# Patient Record
Sex: FEMALE | Race: WHITE | NOT HISPANIC OR LATINO | Employment: OTHER | URBAN - NONMETROPOLITAN AREA
[De-identification: names, ages, dates, MRNs, and addresses within clinical notes are randomized per-mention and may not be internally consistent; named-entity substitution may affect disease eponyms.]

---

## 2017-08-17 ENCOUNTER — TELEPHONE (OUTPATIENT)
Dept: GASTROENTEROLOGY | Facility: CLINIC | Age: 32
End: 2017-08-17

## 2017-09-08 ENCOUNTER — TELEPHONE (OUTPATIENT)
Dept: GASTROENTEROLOGY | Facility: CLINIC | Age: 32
End: 2017-09-08

## 2017-09-08 NOTE — TELEPHONE ENCOUNTER
Patient called yesterday wanting to know what was decided about changing her medication.  (Telephone Encounter created on 08/17/2017)  When I called to ask her if she ever had a CT or CTE done she said she had one done yesterday when she was admitted into [facility] for Bowel Obstruction.  She said she has improved since yesterday and they may not have to do any surgery.  I will call and get records faxed over from the CT.

## 2017-09-11 ENCOUNTER — TELEPHONE (OUTPATIENT)
Dept: GASTROENTEROLOGY | Facility: CLINIC | Age: 32
End: 2017-09-11

## 2017-09-11 NOTE — TELEPHONE ENCOUNTER
She had a CT done at [facility].  Results under media.  She had an SBO that was treated conservatively.    So, the answer to the original question is that she should remain on Pentasa.  We could fill out paperwork if that would help.  The other medications don't work in the small bowel, where her problem is.  I also don't see an office appt on the books.  She should schedule with me for routine in Jan and earlier with Keli if problems.    Zehra Solis MD

## 2017-09-11 NOTE — TELEPHONE ENCOUNTER
Patient called this morning stating she was released from [facility] for what they thought was a bowel obstruction but later told she has inflammation in her intestines.  She is still in pain, hurts to walk, and has to sleep holding a pillow against her stomach.  She wants to know if  wants to see her. I informed her that I had already received the CT report earlier this morning and sent it to Dr. Solis to review.  Please check with Dr. Solis on this patient to follow up on what may need to be done.  She also was still wanting to know about her medication change.    (See other telephone encounters.)

## 2017-09-13 ENCOUNTER — TELEPHONE (OUTPATIENT)
Dept: GASTROENTEROLOGY | Facility: CLINIC | Age: 32
End: 2017-09-13

## 2017-09-13 NOTE — TELEPHONE ENCOUNTER
I spoke with patient insurance and they stated that I would have to start an appeal for patients Pentasa. I am going to give patient samples so she has it now.     I will need a letter from you stating why she needs this medication.

## 2017-09-20 RX ORDER — MESALAMINE 500 MG/1
500 CAPSULE, EXTENDED RELEASE ORAL 3 TIMES DAILY
Qty: 90 CAPSULE | Refills: 5 | Status: SHIPPED | OUTPATIENT
Start: 2017-09-20 | End: 2017-09-25 | Stop reason: SDUPTHER

## 2017-09-25 RX ORDER — MESALAMINE 500 MG/1
1000 CAPSULE, EXTENDED RELEASE ORAL 3 TIMES DAILY
Qty: 180 CAPSULE | Refills: 5 | Status: SHIPPED | OUTPATIENT
Start: 2017-09-25 | End: 2020-09-20

## 2017-11-14 ENCOUNTER — HOSPITAL ENCOUNTER (INPATIENT)
Facility: HOSPITAL | Age: 32
LOS: 3 days | Discharge: HOME | End: 2017-11-18
Attending: EMERGENCY MEDICINE | Admitting: SPECIALIST

## 2017-11-14 ENCOUNTER — APPOINTMENT (OUTPATIENT)
Dept: CT IMAGING | Facility: HOSPITAL | Age: 32
End: 2017-11-14

## 2017-11-14 DIAGNOSIS — K56.609 INTESTINAL OBSTRUCTION, UNSPECIFIED CAUSE, UNSPECIFIED WHETHER PARTIAL OR COMPLETE (HCC): ICD-10-CM

## 2017-11-14 DIAGNOSIS — K50.019 CROHN'S DISEASE OF SMALL INTESTINE WITH COMPLICATION (HCC): Primary | ICD-10-CM

## 2017-11-14 LAB
ALBUMIN SERPL-MCNC: 4.7 G/DL (ref 3.5–5)
ALBUMIN/GLOB SERPL: 1.3 G/DL (ref 1.1–2.5)
ALP SERPL-CCNC: 108 U/L (ref 24–120)
ALT SERPL W P-5'-P-CCNC: 37 U/L (ref 0–54)
ANION GAP SERPL CALCULATED.3IONS-SCNC: 14 MMOL/L (ref 4–13)
AST SERPL-CCNC: 34 U/L (ref 7–45)
BASOPHILS # BLD AUTO: 0.03 10*3/MM3 (ref 0–0.2)
BASOPHILS NFR BLD AUTO: 0.1 % (ref 0–2)
BILIRUB SERPL-MCNC: 0.4 MG/DL (ref 0.1–1)
BUN BLD-MCNC: 17 MG/DL (ref 5–21)
BUN/CREAT SERPL: 23.3 (ref 7–25)
CALCIUM SPEC-SCNC: 10.1 MG/DL (ref 8.4–10.4)
CHLORIDE SERPL-SCNC: 102 MMOL/L (ref 98–110)
CO2 SERPL-SCNC: 25 MMOL/L (ref 24–31)
CREAT BLD-MCNC: 0.73 MG/DL (ref 0.5–1.4)
DEPRECATED RDW RBC AUTO: 44.2 FL (ref 40–54)
EOSINOPHIL # BLD AUTO: 0.05 10*3/MM3 (ref 0–0.7)
EOSINOPHIL NFR BLD AUTO: 0.2 % (ref 0–4)
ERYTHROCYTE [DISTWIDTH] IN BLOOD BY AUTOMATED COUNT: 14 % (ref 12–15)
GFR SERPL CREATININE-BSD FRML MDRD: 91 ML/MIN/1.73
GLOBULIN UR ELPH-MCNC: 3.5 GM/DL
GLUCOSE BLD-MCNC: 112 MG/DL (ref 70–100)
HCT VFR BLD AUTO: 40.8 % (ref 37–47)
HGB BLD-MCNC: 13.6 G/DL (ref 12–16)
HOLD SPECIMEN: NORMAL
HOLD SPECIMEN: NORMAL
IMM GRANULOCYTES # BLD: 0.13 10*3/MM3 (ref 0–0.03)
IMM GRANULOCYTES NFR BLD: 0.5 % (ref 0–5)
LIPASE SERPL-CCNC: 43 U/L (ref 23–203)
LYMPHOCYTES # BLD AUTO: 1.12 10*3/MM3 (ref 0.72–4.86)
LYMPHOCYTES NFR BLD AUTO: 4.3 % (ref 15–45)
MCH RBC QN AUTO: 29.2 PG (ref 28–32)
MCHC RBC AUTO-ENTMCNC: 33.3 G/DL (ref 33–36)
MCV RBC AUTO: 87.7 FL (ref 82–98)
MONOCYTES # BLD AUTO: 1.31 10*3/MM3 (ref 0.19–1.3)
MONOCYTES NFR BLD AUTO: 5 % (ref 4–12)
NEUTROPHILS # BLD AUTO: 23.38 10*3/MM3 (ref 1.87–8.4)
NEUTROPHILS NFR BLD AUTO: 89.9 % (ref 39–78)
PLATELET # BLD AUTO: 395 10*3/MM3 (ref 130–400)
PMV BLD AUTO: 10.1 FL (ref 6–12)
POTASSIUM BLD-SCNC: 4.1 MMOL/L (ref 3.5–5.3)
PROT SERPL-MCNC: 8.2 G/DL (ref 6.3–8.7)
RBC # BLD AUTO: 4.65 10*6/MM3 (ref 4.2–5.4)
SODIUM BLD-SCNC: 141 MMOL/L (ref 135–145)
WBC NRBC COR # BLD: 26.02 10*3/MM3 (ref 4.8–10.8)
WHOLE BLOOD HOLD SPECIMEN: NORMAL
WHOLE BLOOD HOLD SPECIMEN: NORMAL

## 2017-11-14 PROCEDURE — 81001 URINALYSIS AUTO W/SCOPE: CPT | Performed by: EMERGENCY MEDICINE

## 2017-11-14 PROCEDURE — 25010000002 MORPHINE SULFATE (PF) 2 MG/ML SOLUTION: Performed by: EMERGENCY MEDICINE

## 2017-11-14 PROCEDURE — 83690 ASSAY OF LIPASE: CPT | Performed by: EMERGENCY MEDICINE

## 2017-11-14 PROCEDURE — 85025 COMPLETE CBC W/AUTO DIFF WBC: CPT | Performed by: EMERGENCY MEDICINE

## 2017-11-14 PROCEDURE — 81025 URINE PREGNANCY TEST: CPT | Performed by: EMERGENCY MEDICINE

## 2017-11-14 PROCEDURE — 99285 EMERGENCY DEPT VISIT HI MDM: CPT

## 2017-11-14 PROCEDURE — 25010000002 ONDANSETRON PER 1 MG: Performed by: EMERGENCY MEDICINE

## 2017-11-14 PROCEDURE — 74177 CT ABD & PELVIS W/CONTRAST: CPT

## 2017-11-14 PROCEDURE — 80053 COMPREHEN METABOLIC PANEL: CPT | Performed by: EMERGENCY MEDICINE

## 2017-11-14 PROCEDURE — 87086 URINE CULTURE/COLONY COUNT: CPT | Performed by: EMERGENCY MEDICINE

## 2017-11-14 PROCEDURE — 80074 ACUTE HEPATITIS PANEL: CPT | Performed by: INTERNAL MEDICINE

## 2017-11-14 RX ORDER — ONDANSETRON 2 MG/ML
4 INJECTION INTRAMUSCULAR; INTRAVENOUS ONCE
Status: COMPLETED | OUTPATIENT
Start: 2017-11-14 | End: 2017-11-14

## 2017-11-14 RX ORDER — MORPHINE SULFATE 2 MG/ML
2 INJECTION, SOLUTION INTRAMUSCULAR; INTRAVENOUS ONCE
Status: COMPLETED | OUTPATIENT
Start: 2017-11-14 | End: 2017-11-14

## 2017-11-14 RX ORDER — SODIUM CHLORIDE 0.9 % (FLUSH) 0.9 %
10 SYRINGE (ML) INJECTION AS NEEDED
Status: DISCONTINUED | OUTPATIENT
Start: 2017-11-14 | End: 2017-11-18 | Stop reason: HOSPADM

## 2017-11-14 RX ADMIN — ONDANSETRON 4 MG: 2 INJECTION, SOLUTION INTRAMUSCULAR; INTRAVENOUS at 21:22

## 2017-11-14 RX ADMIN — SODIUM CHLORIDE 1000 ML: 9 INJECTION, SOLUTION INTRAVENOUS at 21:57

## 2017-11-14 RX ADMIN — MORPHINE SULFATE 2 MG: 2 INJECTION, SOLUTION INTRAMUSCULAR; INTRAVENOUS at 21:23

## 2017-11-15 PROBLEM — K50.00 CROHN'S DISEASE OF SMALL INTESTINE (HCC): Status: ACTIVE | Noted: 2017-11-15

## 2017-11-15 PROBLEM — K56.609 BOWEL OBSTRUCTION (HCC): Status: ACTIVE | Noted: 2017-11-15

## 2017-11-15 LAB
B-HCG UR QL: NEGATIVE
BACTERIA UR QL AUTO: ABNORMAL /HPF
BASOPHILS # BLD AUTO: 0.01 10*3/MM3 (ref 0–0.2)
BASOPHILS NFR BLD AUTO: 0.1 % (ref 0–2)
BILIRUB UR QL STRIP: ABNORMAL
CLARITY UR: ABNORMAL
COLOR UR: ABNORMAL
DEPRECATED RDW RBC AUTO: 46.8 FL (ref 40–54)
EOSINOPHIL # BLD AUTO: 0.05 10*3/MM3 (ref 0–0.7)
EOSINOPHIL NFR BLD AUTO: 0.5 % (ref 0–4)
ERYTHROCYTE [DISTWIDTH] IN BLOOD BY AUTOMATED COUNT: 14.2 % (ref 12–15)
ERYTHROCYTE [SEDIMENTATION RATE] IN BLOOD: 2 MM/HR (ref 0–20)
GLUCOSE UR STRIP-MCNC: NEGATIVE MG/DL
HAV IGM SERPL QL IA: NEGATIVE
HBV CORE IGM SERPL QL IA: NEGATIVE
HBV SURFACE AG SERPL QL IA: NEGATIVE
HCT VFR BLD AUTO: 37.6 % (ref 37–47)
HCV AB SER DONR QL: NEGATIVE
HCV S/C RATIO: 0.07 (ref 0–0.99)
HGB BLD-MCNC: 12 G/DL (ref 12–16)
HGB UR QL STRIP.AUTO: NEGATIVE
IMM GRANULOCYTES # BLD: 0.04 10*3/MM3 (ref 0–0.03)
IMM GRANULOCYTES NFR BLD: 0.4 % (ref 0–5)
KETONES UR QL STRIP: ABNORMAL
LEUKOCYTE ESTERASE UR QL STRIP.AUTO: ABNORMAL
LYMPHOCYTES # BLD AUTO: 0.99 10*3/MM3 (ref 0.72–4.86)
LYMPHOCYTES NFR BLD AUTO: 9 % (ref 15–45)
MCH RBC QN AUTO: 28.8 PG (ref 28–32)
MCHC RBC AUTO-ENTMCNC: 31.9 G/DL (ref 33–36)
MCV RBC AUTO: 90.2 FL (ref 82–98)
MONOCYTES # BLD AUTO: 1.08 10*3/MM3 (ref 0.19–1.3)
MONOCYTES NFR BLD AUTO: 9.9 % (ref 4–12)
MUCOUS THREADS URNS QL MICRO: ABNORMAL /HPF
NEUTROPHILS # BLD AUTO: 8.79 10*3/MM3 (ref 1.87–8.4)
NEUTROPHILS NFR BLD AUTO: 80.1 % (ref 39–78)
NITRITE UR QL STRIP: NEGATIVE
PH UR STRIP.AUTO: 5.5 [PH] (ref 5–8)
PLATELET # BLD AUTO: 349 10*3/MM3 (ref 130–400)
PMV BLD AUTO: 10 FL (ref 6–12)
PROT UR QL STRIP: ABNORMAL
RBC # BLD AUTO: 4.17 10*6/MM3 (ref 4.2–5.4)
RBC # UR: ABNORMAL /HPF
REF LAB TEST METHOD: ABNORMAL
SP GR UR STRIP: >1.03 (ref 1–1.03)
SQUAMOUS #/AREA URNS HPF: ABNORMAL /HPF
UROBILINOGEN UR QL STRIP: ABNORMAL
WBC NRBC COR # BLD: 10.96 10*3/MM3 (ref 4.8–10.8)
WBC UR QL AUTO: ABNORMAL /HPF

## 2017-11-15 PROCEDURE — 25010000002 METHYLPREDNISOLONE PER 125 MG: Performed by: NURSE PRACTITIONER

## 2017-11-15 PROCEDURE — 99222 1ST HOSP IP/OBS MODERATE 55: CPT | Performed by: INTERNAL MEDICINE

## 2017-11-15 PROCEDURE — 85651 RBC SED RATE NONAUTOMATED: CPT | Performed by: NURSE PRACTITIONER

## 2017-11-15 PROCEDURE — 85025 COMPLETE CBC W/AUTO DIFF WBC: CPT | Performed by: NURSE PRACTITIONER

## 2017-11-15 PROCEDURE — 25010000002 MORPHINE SULFATE (PF) 2 MG/ML SOLUTION: Performed by: EMERGENCY MEDICINE

## 2017-11-15 PROCEDURE — 0 IOPAMIDOL 61 % SOLUTION: Performed by: EMERGENCY MEDICINE

## 2017-11-15 PROCEDURE — 25010000003 AMPICILLIN-SULBACTAM PER 1.5 G: Performed by: SPECIALIST

## 2017-11-15 RX ORDER — ONDANSETRON 2 MG/ML
4 INJECTION INTRAMUSCULAR; INTRAVENOUS EVERY 4 HOURS PRN
Status: DISCONTINUED | OUTPATIENT
Start: 2017-11-15 | End: 2017-11-18 | Stop reason: HOSPADM

## 2017-11-15 RX ORDER — AMPICILLIN AND SULBACTAM 2; 1 G/1; G/1
3 INJECTION, POWDER, FOR SOLUTION INTRAMUSCULAR; INTRAVENOUS EVERY 6 HOURS SCHEDULED
Status: DISCONTINUED | OUTPATIENT
Start: 2017-11-15 | End: 2017-11-15

## 2017-11-15 RX ORDER — METHYLPREDNISOLONE SODIUM SUCCINATE 125 MG/2ML
60 INJECTION, POWDER, LYOPHILIZED, FOR SOLUTION INTRAMUSCULAR; INTRAVENOUS EVERY 8 HOURS
Status: DISCONTINUED | OUTPATIENT
Start: 2017-11-15 | End: 2017-11-17

## 2017-11-15 RX ORDER — ONDANSETRON 2 MG/ML
INJECTION INTRAMUSCULAR; INTRAVENOUS
Status: DISCONTINUED
Start: 2017-11-15 | End: 2017-11-15

## 2017-11-15 RX ORDER — MORPHINE SULFATE 2 MG/ML
2 INJECTION, SOLUTION INTRAMUSCULAR; INTRAVENOUS
Status: DISCONTINUED | OUTPATIENT
Start: 2017-11-15 | End: 2017-11-18 | Stop reason: HOSPADM

## 2017-11-15 RX ORDER — PROMETHAZINE HYDROCHLORIDE 25 MG/ML
12.5 INJECTION, SOLUTION INTRAMUSCULAR; INTRAVENOUS ONCE
Status: DISCONTINUED | OUTPATIENT
Start: 2017-11-15 | End: 2017-11-18 | Stop reason: HOSPADM

## 2017-11-15 RX ORDER — SODIUM CHLORIDE 9 MG/ML
125 INJECTION, SOLUTION INTRAVENOUS CONTINUOUS
Status: DISCONTINUED | OUTPATIENT
Start: 2017-11-15 | End: 2017-11-17

## 2017-11-15 RX ADMIN — MORPHINE SULFATE 2 MG: 2 INJECTION, SOLUTION INTRAMUSCULAR; INTRAVENOUS at 15:20

## 2017-11-15 RX ADMIN — AMPICILLIN SODIUM AND SULBACTAM SODIUM 3 G: 2; 1 INJECTION, POWDER, FOR SOLUTION INTRAMUSCULAR; INTRAVENOUS at 14:01

## 2017-11-15 RX ADMIN — MORPHINE SULFATE 2 MG: 2 INJECTION, SOLUTION INTRAMUSCULAR; INTRAVENOUS at 11:05

## 2017-11-15 RX ADMIN — SODIUM CHLORIDE, POTASSIUM CHLORIDE, SODIUM LACTATE AND CALCIUM CHLORIDE 1000 ML: 600; 310; 30; 20 INJECTION, SOLUTION INTRAVENOUS at 04:15

## 2017-11-15 RX ADMIN — PHENOL 2 SPRAY: 1.5 LIQUID ORAL at 12:16

## 2017-11-15 RX ADMIN — MORPHINE SULFATE 2 MG: 2 INJECTION, SOLUTION INTRAMUSCULAR; INTRAVENOUS at 07:51

## 2017-11-15 RX ADMIN — SODIUM CHLORIDE 125 ML/HR: 9 INJECTION, SOLUTION INTRAVENOUS at 13:02

## 2017-11-15 RX ADMIN — METHYLPREDNISOLONE SODIUM SUCCINATE 60 MG: 125 INJECTION, POWDER, FOR SOLUTION INTRAMUSCULAR; INTRAVENOUS at 20:45

## 2017-11-15 RX ADMIN — AMPICILLIN SODIUM AND SULBACTAM SODIUM 3 G: 2; 1 INJECTION, POWDER, FOR SOLUTION INTRAMUSCULAR; INTRAVENOUS at 20:39

## 2017-11-15 RX ADMIN — SODIUM CHLORIDE 125 ML/HR: 9 INJECTION, SOLUTION INTRAVENOUS at 05:44

## 2017-11-15 RX ADMIN — METHYLPREDNISOLONE SODIUM SUCCINATE 60 MG: 125 INJECTION, POWDER, FOR SOLUTION INTRAMUSCULAR; INTRAVENOUS at 13:59

## 2017-11-15 RX ADMIN — IOPAMIDOL 100 ML: 612 INJECTION, SOLUTION INTRAVENOUS at 04:25

## 2017-11-15 RX ADMIN — AMPICILLIN SODIUM AND SULBACTAM SODIUM 3 G: 2; 1 INJECTION, POWDER, FOR SOLUTION INTRAMUSCULAR; INTRAVENOUS at 08:04

## 2017-11-15 NOTE — PLAN OF CARE
Problem: Patient Care Overview (Adult)  Goal: Plan of Care Review  Outcome: Ongoing (interventions implemented as appropriate)    11/15/17 1506   Coping/Psychosocial Response Interventions   Plan Of Care Reviewed With patient   Patient Care Overview   Progress no change   Outcome Evaluation   Outcome Summary/Follow up Plan NG to LIWS. Medicated prn for abdominal pain. Moderate output to NG. IV abx continue. Blood pressure improving. GI consulted for Crohn's.       Goal: Adult Individualization and Mutuality  Outcome: Ongoing (interventions implemented as appropriate)    11/15/17 1506   Individualization   Patient Specific Preferences lights off   Patient Specific Goals decrease pain   Patient Specific Interventions prn pain medication   Mutuality/Individual Preferences   What Anxieties, Fears or Concerns Do You Have About Your Health or Care? none   What Questions Do You Have About Your Health or Care? none   What Information Would Help Us Give You More Personalized Care? none       Goal: Discharge Needs Assessment  Outcome: Ongoing (interventions implemented as appropriate)    11/15/17 1506   Discharge Needs Assessment   Concerns To Be Addressed no discharge needs identified   Readmission Within The Last 30 Days no previous admission in last 30 days   Equipment Needed After Discharge none   Discharge Disposition still a patient   Current Health   Anticipated Changes Related to Illness none   Self-Care   Equipment Currently Used at Home none   Living Environment   Transportation Available family or friend will provide;car         Problem: Bowel Obstruction (Adult)  Goal: Signs and Symptoms of Listed Potential Problems Will be Absent or Manageable (Bowel Obstruction)  Outcome: Ongoing (interventions implemented as appropriate)    11/15/17 1506   Bowel Obstruction   Problems Assessed (Bowel Obstruction) all   Problems Present (Bowel Obstruction) pain;fluid volume deficit/hypovolemia         Problem: Fluid Volume  Deficit (Adult)  Goal: Identify Related Risk Factors and Signs and Symptoms  Outcome: Outcome(s) achieved Date Met:  11/15/17    11/15/17 1506   Fluid Volume Deficit   Fluid Volume Deficit: Related Risk Factors other (see comments);vomiting/diarrhea  (NG tube)   Signs and Symptoms (Fluid Volume Deficit) abdominal cramping/distention;lab value changes;nausea/vomiting, anorexia, diarrhea complaints       Goal: Fluid/Electrolyte Balance  Outcome: Ongoing (interventions implemented as appropriate)    11/15/17 1506   Fluid Volume Deficit (Adult)   Fluid/Electrolyte Balance making progress toward outcome       Goal: Comfort/Well Being  Outcome: Ongoing (interventions implemented as appropriate)    11/15/17 1506   Fluid Volume Deficit (Adult)   Comfort/Well Being making progress toward outcome

## 2017-11-15 NOTE — H&P
Damaris Billy MD  H&P    Patient Care Team:  No Known Provider as PCP - General    Chief complaint abdominal pain nausea vomiting    Subjective     Dhara Carmona  is a 32 y.o. female presents with onset of abdominal pain nausea vomiting and bloating yesterday progressively worsened through the day.  Came to the ER for evaluation after vomiting.  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  Treated by Dr. Solis.  Last bowel movement was yesterday and normal..      Review of Systems   Pertinent items are noted in HPI, all other systems reviewed and negative    History  History reviewed. No pertinent past medical history.  Past Surgical History:   Procedure Laterality Date   • ANAL FISTULA REPAIR     •  SECTION     • DILATATION AND CURETTAGE       History reviewed. No pertinent family history.  Social History   Substance Use Topics   • Smoking status: Never Smoker   • Smokeless tobacco: None   • Alcohol use No     Prescriptions Prior to Admission   Medication Sig Dispense Refill Last Dose   • mesalamine (PENTASA) 500 MG CR capsule Take 2 capsules by mouth 3 (Three) Times a Day. 180 capsule 5      Allergies:  Guaifenesin er    Objective     Vital Signs  Temp:  [97.9 °F (36.6 °C)-99.1 °F (37.3 °C)] 99.1 °F (37.3 °C)  Heart Rate:  [] 103  Resp:  [12-20] 16  BP: ()/(54-85) 98/65    Physical Exam:      General Appearance:    Alert, cooperative, in no acute distress   Head:    Normocephalic, without obvious abnormality, atraumatic   Eyes:            Lids and lashes normal, conjunctivae and sclerae normal, no   icterus, no pallor, corneas clear, PERRLA   Ears:    Ears appear intact with no abnormalities noted   Neck:   No adenopathy, supple, trachea midline   Back:     No kyphosis present, no scoliosis present, no skin lesions,      erythema or scars, no tenderness to percussion or                   palpation,   range of motion normal   Lungs:     Clear to auscultation,respirations  regular, even and                  unlabored    Heart:    Regular rhythm and normal rate, normal S1 and S2, no            murmur, no gallop, no rub, no click   Chest Wall:    No abnormalities observed   Abdomen:   Soft distended with tympany mildly some mild right lower quadrant tenderness no peritoneal signs no masses or organomegaly that I can appreciate    Rectal:     Deferred   Extremities:   Moves all extremities well, no edema, no cyanosis, no             redness   Pulses:   Pulses palpable and equal bilaterally   Skin:   No bleeding, bruising or rash   Lymph nodes:   No palpable adenopathy   Neurologic:   No focal deficits       Results Review:      Lab Results (last 72 hours)     Procedure Component Value Units Date/Time    CBC & Differential [[id number]] Collected:  11/14/17 2126    Specimen:  Blood Updated:  11/14/17 2132    Narrative:       The following orders were created for panel order CBC & Differential.  Procedure                               Abnormality         Status                     ---------                               -----------         ------                     CBC Auto Differential[[id number]]         Abnormal            Final result                 Please view results for these tests on the individual orders.    CBC Auto Differential [[id number]]  (Abnormal) Collected:  11/14/17 2126    Specimen:  Blood Updated:  11/14/17 2132     WBC 26.02 (H) 10*3/mm3      RBC 4.65 10*6/mm3      Hemoglobin 13.6 g/dL      Hematocrit 40.8 %      MCV 87.7 fL      MCH 29.2 pg      MCHC 33.3 g/dL      RDW 14.0 %      RDW-SD 44.2 fl      MPV 10.1 fL      Platelets 395 10*3/mm3      Neutrophil % 89.9 (H) %      Lymphocyte % 4.3 (L) %      Monocyte % 5.0 %      Eosinophil % 0.2 %      Basophil % 0.1 %      Immature Grans % 0.5 %      Neutrophils, Absolute 23.38 (H) 10*3/mm3      Lymphocytes, Absolute 1.12 10*3/mm3      Monocytes, Absolute 1.31 (H) 10*3/mm3      Eosinophils, Absolute 0.05 10*3/mm3      Basophils,  Absolute 0.03 10*3/mm3      Immature Grans, Absolute 0.13 (H) 10*3/mm3     Comprehensive Metabolic Panel [[id number]]  (Abnormal) Collected:  11/14/17 2126    Specimen:  Blood Updated:  11/14/17 2142     Glucose 112 (H) mg/dL      BUN 17 mg/dL      Creatinine 0.73 mg/dL      Sodium 141 mmol/L      Potassium 4.1 mmol/L      Chloride 102 mmol/L      CO2 25.0 mmol/L      Calcium 10.1 mg/dL      Total Protein 8.2 g/dL      Albumin 4.70 g/dL      ALT (SGPT) 37 U/L      AST (SGOT) 34 U/L      Alkaline Phosphatase 108 U/L      Total Bilirubin 0.4 mg/dL      eGFR Non African Amer 91 mL/min/1.73      Globulin 3.5 gm/dL      A/G Ratio 1.3 g/dL      BUN/Creatinine Ratio 23.3     Anion Gap 14.0 (H) mmol/L     Lipase [[id number]]  (Normal) Collected:  11/14/17 2126    Specimen:  Blood Updated:  11/14/17 2142     Lipase 43 U/L     Ransom Draw [[id number]] Collected:  11/14/17 2126    Specimen:  Blood Updated:  11/14/17 2231    Narrative:       The following orders were created for panel order Ransom Draw.  Procedure                               Abnormality         Status                     ---------                               -----------         ------                     Light Blue Top[[id number]]                                    Final result               Green Top (Gel)[[id number]]                                   Final result               Lavender Top[[id number]]                                      Final result               Red Top[[id number]]                                           Final result                 Please view results for these tests on the individual orders.    Light Blue Top [[id number]] Collected:  11/14/17 2126    Specimen:  Blood Updated:  11/14/17 2231     Extra Tube hold for add-on      Auto resulted       Green Top (Gel) [[id number]] Collected:  11/14/17 2126    Specimen:  Blood Updated:  11/14/17 2231     Extra Tube Hold for add-ons.      Auto resulted.       Lavender Top [[id number]] Collected:  11/14/17  2126    Specimen:  Blood Updated:  11/14/17 2231     Extra Tube hold for add-on      Auto resulted       Red Top [[id number]] Collected:  11/14/17 2126    Specimen:  Blood Updated:  11/14/17 2231     Extra Tube Hold for add-ons.      Auto resulted.       Pregnancy, Urine - Urine, Clean Catch [[id number]]  (Normal) Collected:  11/14/17 2340    Specimen:  Urine from Urine, Clean Catch Updated:  11/15/17 0428     HCG, Urine QL Negative    Urine Culture - Urine, Urine, Clean Catch [[id number]] Collected:  11/14/17 2340    Specimen:  Urine from Urine, Clean Catch Updated:  11/15/17 0557    Urinalysis With / Culture If Indicated - Urine, Clean Catch [[id number]]  (Abnormal) Collected:  11/14/17 2340    Specimen:  Urine from Urine, Clean Catch Updated:  11/15/17 0559     Color, UA Dark Yellow (A)     Appearance, UA Cloudy (A)     pH, UA 5.5     Specific Gravity, UA >1.030 (H)     Glucose, UA Negative     Ketones, UA Trace (A)     Bilirubin, UA Small (1+) (A)     Blood, UA Negative     Protein, UA Trace (A)     Leuk Esterase, UA Trace (A)     Nitrite, UA Negative     Urobilinogen, UA 1.0 E.U./dL    Urinalysis, Microscopic Only - Urine, Clean Catch [[id number]]  (Abnormal) Collected:  11/14/17 2340    Specimen:  Urine from Urine, Clean Catch Updated:  11/15/17 0559     RBC, UA 3-5 (A) /HPF      WBC, UA 3-5 (A) /HPF      Bacteria, UA 1+ (A) /HPF      Squamous Epithelial Cells, UA 7-12 (A) /HPF      Mucus, UA Large/3+ (A) /HPF      Methodology Automated Microscopy        Imaging Results (last 72 hours)     Procedure Component Value Units Date/Time    CT Abdomen Pelvis With Contrast [[id number]] Collected:  11/15/17 0725     Updated:  11/15/17 0737    Narrative:       EXAMINATION:  CT ABDOMEN PELVIS W CONTRAST-  11/14/2017 2:09 AM CST     HISTORY: Abdominal pain with nausea and vomiting. White blood cell count  of 26,020. There is a history of ulcerative colitis and anal fistula  repair.     TECHNIQUE: Spiral CT was performed of the  abdomen and pelvis with  contrast. Multiplanar images were reconstructed.     DLP: 277 mGy-cm. Automated dosage control was utilized.     COMPARISON: No comparison study.      LUNG BASES: Minimal dependent atelectasis.     LIVER AND SPLEEN: There is fatty infiltration of the liver. Mild  distention of the gallbladder measuring 4.4 cm in short axis diameter.  Unremarkable spleen.     PANCREAS: No pancreatic mass or inflammatory change.     KIDNEYS AND ADRENALS: The kidneys and adrenal glands are unremarkable.  No bladder abnormality is detected.     BOWEL: The stomach and small bowel are distended with fluid with  multiple air-fluid levels. There is a moderately long segment of  abnormal distal ileum with wall thickening and stranding of the fat  around the distal small bowel. This extends to the ileocecal valve.  Small bowel measures up to 4.6 cm in diameter. The colon is decompressed  from the splenic flexure distally. The appendix is normal.     OTHER: There is a small amount of free fluid in the pelvis. There has  been prior tubal ligation. The uterus and ovaries demonstrate a normal  CT appearance. 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       Impression:       1. There is wall thickening of a fairly long segment of distal ileum  extending all the way to the ileocecal valve. Small bowel is dilated  proximal to the area of thickening and measures up to 4.6 cm in  diameter. There is gastric distention with fluid. There are air-fluid  levels throughout the dilated small bowel. There is decompression of the  colon from the splenic flexure distally. These findings are consistent  with inflammation of the distal ileum. This would be a common finding in  patients with Crohn's disease. Ulcerative colitis with backwash ileitis  is possible. Yersinia infection and other infectious or inflammatory  diseases are also in the differential diagnosis.  2. Small amount of free fluid likely related  to the above findings.  3. Fatty infiltration of the liver.        This report was finalized on 11/15/2017 07:34 by Dr. Kuldip Dent MD.          Assessment/Plan     Active Problems:    Bowel obstruction  Dehydration      Consults GI.  She received several boluses yesterday for low blood pressure dehydration.  We will increase artery fluids rate now monitor I's and O's closely.      Damaris Billy MD  11/15/17  9:27 AM

## 2017-11-15 NOTE — ED NOTES
SEE [facility] DOWN TIME FLOW SHEETS FOR FURTHER CHARTING.       Phil Ferro, RN  11/15/17 4344

## 2017-11-15 NOTE — PAYOR COMM NOTE
"Dhara Santiago (32 y.o. Female) [id number]  [facility] phone   Fax         Date of Birth Social Security Number Address Home Phone MRN    [id number]  [id number] [address] [phone] [id number]    Tenriism Marital Status          Mandaen        Admission Date Admission Type Admitting Provider Attending Provider Department, Room/Bed    11/14/17 Emergency Damaris Billy MD Tyrrell, Dana R, MD [facility] 3C, 374/1    Discharge Date Discharge Disposition Discharge Destination                      Attending Provider: Damaris Billy MD     Allergies:  Guaifenesin Er    Isolation:  None   Infection:  None   Code Status:  FULL    Ht:  60\" (152.4 cm)   Wt:  133 lb (60.3 kg)    Admission Cmt:  None   Principal Problem:  None                Active Insurance as of 11/14/2017     Primary Coverage     Payor Plan Insurance Group Employer/Plan Group    [employer]ORT [employer]ORT MEDICAID     Payor Plan Address Payor Plan Phone Number Effective From Effective To    [address] [phone] 11/1/2016     [place] [address]       Subscriber Name Subscriber Birth Date Member ID       DHARA SANTIAGO 1985 [id number]                 Emergency Contacts      (Rel.) Home Phone Work Phone Mobile Phone    Contact,No [phone] -- --               History & Physical      Damaris Billy MD at 11/15/2017  9:27 AM            Damaris Billy MD  H&P    Patient Care Team:  No Known Provider as PCP - General    Chief complaint abdominal pain nausea vomiting    Subjective     Dhara Santiago  is a 32 y.o. female presents with onset of abdominal pain nausea vomiting and bloating yesterday progressively worsened through the day.  Came to the ER for evaluation after vomiting.  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  Treated by Dr. Solis.  Last bowel movement was yesterday and normal..      Review of " Systems   Pertinent items are noted in HPI, all other systems reviewed and negative    History  History reviewed. No pertinent past medical history.  Past Surgical History:   Procedure Laterality Date   • ANAL FISTULA REPAIR     •  SECTION     • DILATATION AND CURETTAGE       History reviewed. No pertinent family history.  Social History   Substance Use Topics   • Smoking status: Never Smoker   • Smokeless tobacco: None   • Alcohol use No     Prescriptions Prior to Admission   Medication Sig Dispense Refill Last Dose   • mesalamine (PENTASA) 500 MG CR capsule Take 2 capsules by mouth 3 (Three) Times a Day. 180 capsule 5      Allergies:  Guaifenesin er    Objective     Vital Signs  Temp:  [97.9 °F (36.6 °C)-99.1 °F (37.3 °C)] 99.1 °F (37.3 °C)  Heart Rate:  [] 103  Resp:  [12-20] 16  BP: ()/(54-85) 98/65    Physical Exam:      General Appearance:    Alert, cooperative, in no acute distress   Head:    Normocephalic, without obvious abnormality, atraumatic   Eyes:            Lids and lashes normal, conjunctivae and sclerae normal, no   icterus, no pallor, corneas clear, PERRLA   Ears:    Ears appear intact with no abnormalities noted   Neck:   No adenopathy, supple, trachea midline   Back:     No kyphosis present, no scoliosis present, no skin lesions,      erythema or scars, no tenderness to percussion or                   palpation,   range of motion normal   Lungs:     Clear to auscultation,respirations regular, even and                  unlabored    Heart:    Regular rhythm and normal rate, normal S1 and S2, no            murmur, no gallop, no rub, no click   Chest Wall:    No abnormalities observed   Abdomen:   Soft distended with tympany mildly some mild right lower quadrant tenderness no peritoneal signs no masses or organomegaly that I can appreciate    Rectal:     Deferred   Extremities:   Moves all extremities well, no edema, no cyanosis, no             redness   Pulses:   Pulses palpable  and equal bilaterally   Skin:   No bleeding, bruising or rash   Lymph nodes:   No palpable adenopathy   Neurologic:   No focal deficits       Results Review:      Lab Results (last 72 hours)     Procedure Component Value Units Date/Time    CBC & Differential [[id number]] Collected:  11/14/17 2126    Specimen:  Blood Updated:  11/14/17 2132    Narrative:       The following orders were created for panel order CBC & Differential.  Procedure                               Abnormality         Status                     ---------                               -----------         ------                     CBC Auto Differential[[id number]]         Abnormal            Final result                 Please view results for these tests on the individual orders.    CBC Auto Differential [[id number]]  (Abnormal) Collected:  11/14/17 2126    Specimen:  Blood Updated:  11/14/17 2132     WBC 26.02 (H) 10*3/mm3      RBC 4.65 10*6/mm3      Hemoglobin 13.6 g/dL      Hematocrit 40.8 %      MCV 87.7 fL      MCH 29.2 pg      MCHC 33.3 g/dL      RDW 14.0 %      RDW-SD 44.2 fl      MPV 10.1 fL      Platelets 395 10*3/mm3      Neutrophil % 89.9 (H) %      Lymphocyte % 4.3 (L) %      Monocyte % 5.0 %      Eosinophil % 0.2 %      Basophil % 0.1 %      Immature Grans % 0.5 %      Neutrophils, Absolute 23.38 (H) 10*3/mm3      Lymphocytes, Absolute 1.12 10*3/mm3      Monocytes, Absolute 1.31 (H) 10*3/mm3      Eosinophils, Absolute 0.05 10*3/mm3      Basophils, Absolute 0.03 10*3/mm3      Immature Grans, Absolute 0.13 (H) 10*3/mm3     Comprehensive Metabolic Panel [[id number]]  (Abnormal) Collected:  11/14/17 2126    Specimen:  Blood Updated:  11/14/17 2142     Glucose 112 (H) mg/dL      BUN 17 mg/dL      Creatinine 0.73 mg/dL      Sodium 141 mmol/L      Potassium 4.1 mmol/L      Chloride 102 mmol/L      CO2 25.0 mmol/L      Calcium 10.1 mg/dL      Total Protein 8.2 g/dL      Albumin 4.70 g/dL      ALT (SGPT) 37 U/L      AST (SGOT) 34 U/L      Alkaline  Phosphatase 108 U/L      Total Bilirubin 0.4 mg/dL      eGFR Non African Amer 91 mL/min/1.73      Globulin 3.5 gm/dL      A/G Ratio 1.3 g/dL      BUN/Creatinine Ratio 23.3     Anion Gap 14.0 (H) mmol/L     Lipase [[id number]]  (Normal) Collected:  11/14/17 2126    Specimen:  Blood Updated:  11/14/17 2142     Lipase 43 U/L     [place] Draw [[id number]] Collected:  11/14/17 2126    Specimen:  Blood Updated:  11/14/17 2231    Narrative:       The following orders were created for panel order [place] Draw.  Procedure                               Abnormality         Status                     ---------                               -----------         ------                     Light Blue Top[[id number]]                                    Final result               Green Top (Gel)[[id number]]                                   Final result               Lavender Top[[id number]]                                      Final result               Red Top[[id number]]                                           Final result                 Please view results for these tests on the individual orders.    Light Blue Top [[id number]] Collected:  11/14/17 2126    Specimen:  Blood Updated:  11/14/17 2231     Extra Tube hold for add-on      Auto resulted       Green Top (Gel) [[id number]] Collected:  11/14/17 2126    Specimen:  Blood Updated:  11/14/17 2231     Extra Tube Hold for add-ons.      Auto resulted.       Lavender Top [[id number]] Collected:  11/14/17 2126    Specimen:  Blood Updated:  11/14/17 2231     Extra Tube hold for add-on      Auto resulted       Red Top [[id number]] Collected:  11/14/17 2126    Specimen:  Blood Updated:  11/14/17 2231     Extra Tube Hold for add-ons.      Auto resulted.       Pregnancy, Urine - Urine, Clean Catch [[id number]]  (Normal) Collected:  11/14/17 2340    Specimen:  Urine from Urine, Clean Catch Updated:  11/15/17 0428     HCG, Urine QL Negative    Urine Culture - Urine, Urine, Clean Catch [[id number]] Collected:   11/14/17 2340    Specimen:  Urine from Urine, Clean Catch Updated:  11/15/17 0557    Urinalysis With / Culture If Indicated - Urine, Clean Catch [[id number]]  (Abnormal) Collected:  11/14/17 2340    Specimen:  Urine from Urine, Clean Catch Updated:  11/15/17 0559     Color, UA Dark Yellow (A)     Appearance, UA Cloudy (A)     pH, UA 5.5     Specific Gravity, UA >1.030 (H)     Glucose, UA Negative     Ketones, UA Trace (A)     Bilirubin, UA Small (1+) (A)     Blood, UA Negative     Protein, UA Trace (A)     Leuk Esterase, UA Trace (A)     Nitrite, UA Negative     Urobilinogen, UA 1.0 E.U./dL    Urinalysis, Microscopic Only - Urine, Clean Catch [[id number]]  (Abnormal) Collected:  11/14/17 2340    Specimen:  Urine from Urine, Clean Catch Updated:  11/15/17 0559     RBC, UA 3-5 (A) /HPF      WBC, UA 3-5 (A) /HPF      Bacteria, UA 1+ (A) /HPF      Squamous Epithelial Cells, UA 7-12 (A) /HPF      Mucus, UA Large/3+ (A) /HPF      Methodology Automated Microscopy        Imaging Results (last 72 hours)     Procedure Component Value Units Date/Time    CT Abdomen Pelvis With Contrast [[id number]] Collected:  11/15/17 0725     Updated:  11/15/17 0737    Narrative:       EXAMINATION:  CT ABDOMEN PELVIS W CONTRAST-  11/14/2017 2:09 AM CST     HISTORY: Abdominal pain with nausea and vomiting. White blood cell count  of 26,020. There is a history of ulcerative colitis and anal fistula  repair.     TECHNIQUE: Spiral CT was performed of the abdomen and pelvis with  contrast. Multiplanar images were reconstructed.     DLP: 277 mGy-cm. Automated dosage control was utilized.     COMPARISON: No comparison study.      LUNG BASES: Minimal dependent atelectasis.     LIVER AND SPLEEN: There is fatty infiltration of the liver. Mild  distention of the gallbladder measuring 4.4 cm in short axis diameter.  Unremarkable spleen.     PANCREAS: No pancreatic mass or inflammatory change.     KIDNEYS AND ADRENALS: The kidneys and adrenal glands are  unremarkable.  No bladder abnormality is detected.     BOWEL: The stomach and small bowel are distended with fluid with  multiple air-fluid levels. There is a moderately long segment of  abnormal distal ileum with wall thickening and stranding of the fat  around the distal small bowel. This extends to the ileocecal valve.  Small bowel measures up to 4.6 cm in diameter. The colon is decompressed  from the splenic flexure distally. The appendix is normal.     OTHER: There is a small amount of free fluid in the pelvis. There has  been prior tubal ligation. The uterus and ovaries demonstrate a normal  CT appearance. 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       Impression:       1. There is wall thickening of a fairly long segment of distal ileum  extending all the way to the ileocecal valve. Small bowel is dilated  proximal to the area of thickening and measures up to 4.6 cm in  diameter. There is gastric distention with fluid. There are air-fluid  levels throughout the dilated small bowel. There is decompression of the  colon from the splenic flexure distally. These findings are consistent  with inflammation of the distal ileum. This would be a common finding in  patients with Crohn's disease. Ulcerative colitis with backwash ileitis  is possible. Yersinia infection and other infectious or inflammatory  diseases are also in the differential diagnosis.  2. Small amount of free fluid likely related to the above findings.  3. Fatty infiltration of the liver.        This report was finalized on 11/15/2017 07:34 by Dr. Kuldip Dent MD.          Assessment/Plan     Active Problems:    Bowel obstruction  Dehydration      Consults GI.  She received several boluses yesterday for low blood pressure dehydration.  We will increase artery fluids rate now monitor I's and O's closely.      Daamris Billy MD  11/15/17  9:27 AM           Electronically signed by Damaris Billy MD at 11/15/2017   9:29 AM           Emergency Department Notes      Phil Ferro RN at 11/14/2017 11:45 PM          SEE [facility] DOWN TIME FLOW SHEETS FOR FURTHER CHARTING.       Phil Ferro RN  11/15/17 0434       Electronically signed by Phil Ferro RN at 11/15/2017  4:34 AM        Hospital Medications (active)       Dose Frequency Start End    ampicillin-sulbactam 3 g/100 mL 0.9% NS (MBP) 3 g Every 6 Hours 11/15/2017 11/20/2017    Sig - Route: Infuse 100 mL into a venous catheter Every 6 (Six) Hours. - Intravenous    benzocaine-menthol (CHLORASEPTIC) lozenge 1 lozenge 1 lozenge Every 2 Hours PRN 11/15/2017     Sig - Route: Dissolve 1 lozenge in the mouth Every 2 (Two) Hours As Needed for Sore Throat. - Mouth/Throat    iopamidol (ISOVUE-300) 61 % injection 100 mL 100 mL Once in Imaging 11/15/2017 11/15/2017    Sig - Route: Infuse 100 mL into a venous catheter Once. - Intravenous    lactated ringers bolus 1,000 mL 1,000 mL Once 11/15/2017 11/15/2017    Sig - Route: Infuse 1,000 mL into a venous catheter 1 (One) Time. - Intravenous    methylPREDNISolone sodium succinate (SOLU-Medrol) injection 60 mg 60 mg Every 8 Hours 11/15/2017     Sig - Route: Infuse 0.96 mL into a venous catheter Every 8 (Eight) Hours. - Intravenous    Morphine sulfate (PF) injection 2 mg 2 mg Once 11/14/2017 11/14/2017    Sig - Route: Infuse 1 mL into a venous catheter 1 (One) Time. - Intravenous    Morphine sulfate (PF) injection 2 mg 2 mg Every 2 Hours PRN 11/15/2017 11/25/2017    Sig - Route: Infuse 1 mL into a venous catheter Every 2 (Two) Hours As Needed for Severe Pain . - Intravenous    ondansetron (ZOFRAN) injection 4 mg 4 mg Once 11/14/2017 11/14/2017    Sig - Route: Infuse 2 mL into a venous catheter 1 (One) Time. - Intravenous    ondansetron (ZOFRAN) injection 4 mg 4 mg Every 4 Hours PRN 11/15/2017     Sig - Route: Infuse 2 mL into a venous catheter Every 4 (Four) Hours As Needed for Nausea or Vomiting. - Intravenous    phenol  (CHLORASEPTIC) 1.4 % liquid 2 spray 2 spray Every 2 Hours PRN 11/15/2017     Sig - Route: Apply 2 sprays to the mouth or throat Every 2 (Two) Hours As Needed for Sore Throat. - Mouth/Throat    promethazine (PHENERGAN) injection 12.5 mg 12.5 mg Once 11/15/2017     Sig - Route: Infuse 0.5 mL into a venous catheter 1 (One) Time. - Intravenous    sodium chloride 0.9 % bolus 1,000 mL 1,000 mL Once 11/14/2017 11/14/2017    Sig - Route: Infuse 1,000 mL into a venous catheter 1 (One) Time. - Intravenous    Cosign for Ordering: Accepted by Damaris Billy MD on 11/15/2017  9:27 AM    sodium chloride 0.9 % bolus 1,000 mL 1,000 mL Once 11/14/2017     Sig - Route: Infuse 1,000 mL into a venous catheter 1 (One) Time. - Intravenous    sodium chloride 0.9 % flush 10 mL 10 mL As Needed 11/14/2017     Sig - Route: Infuse 10 mL into a venous catheter As Needed for Line Care. - Intravenous    sodium chloride 0.9 % infusion 125 mL/hr Continuous 11/15/2017     Sig - Route: Infuse 125 mL/hr into a venous catheter Continuous. - Intravenous    ampicillin-sulbactam (UNASYN) injection 3 g (Discontinued) 3 g Every 6 Hours Scheduled 11/15/2017 11/15/2017    Sig - Route: Inject 3 g into the shoulder, thigh, or buttocks Every 6 (Six) Hours. - Intramuscular    HYDROmorphone (DILAUDID) 1 MG/ML injection  - ADS Override Pull (Discontinued)   11/14/2017 11/15/2017    Notes to Pharmacy: Created by cabinet override    Reason for Discontinue: Therapy completed    HYDROmorphone (DILAUDID) 1 MG/ML injection  - ADS Override Pull (Discontinued)   11/15/2017 11/15/2017    Notes to Pharmacy: Created by cabinet override    Reason for Discontinue: Therapy completed    iohexol (OMNIPAQUE) 300 MG/ML injection  - ADS Override Pull (Discontinued)   11/15/2017 11/15/2017    Notes to Pharmacy: Created by cabinet override    Reason for Discontinue: Therapy completed    ondansetron (ZOFRAN) 4 MG/2ML injection  - ADS Override Pull (Discontinued)   11/15/2017 11/15/2017     Notes to Pharmacy: Created by cabinet override    Reason for Discontinue: Therapy completed             Consult Notes (last 24 hours) (Notes from 11/14/2017  1:37 PM through 11/15/2017  1:37 PM)      COLLIN Villegas at 11/15/2017 10:38 AM  Version 3 of 3     Consult Orders:    1. Inpatient Consult to Gastroenterology [[id number]] ordered by Damaris Billy MD at 11/15/17 0858                [facility] Gastroenterology  Initial Inpatient Consult Note    Referring Provider: Damaris Billy MD    Reason for Consultation: abnormal ct , bowel obstruction,crohns     Subjective     History of present illness:         32-year-old female admitted with bowel obstruction and dehydration.  She has history of Crohn's disease followed by Dr. Solis.  Last office visit with Dr. Solis was February 2016.  Next appointment is 01/22/2018.  She had a colonoscopy report  February 2016 that showed aphtha in the terminal ileum and recall was recommended 2 years.  She was diagnosed with Crohn's disease 2002 after perirectal abscess.  She has been on Pentasa since then.  Takes Pentasa 500 mg 2 pills 3 times daily.  She has never taken steroids.  No history of bowel surgery.   Her problems started yesterday with abdominal pain as well as nausea vomiting abdominal bloating.  Typically she moves her bowels daily.  No diarrhea recently.  Had been having 2 bowel movements daily.  She has not had any rectal bleeding.  No fevers or chills.  No unintentional weight loss.  No skin rash.  No joint pain.      CT scan abdomen and pelvis with contrast notes wall thickening of the distal ileum extending all the way to the ileocecal valve, small bowel dilated ox multiple area of thickening and measures up to 4.6 cm in diameter, gastric distention with fluid noted.. Platelets of count on admission 26, hemoglobin 13.6.      She has history of small bowel obstruction treated conservatively at [facility], records under media.  This was September  2017.            Past Medical History:  History reviewed. No pertinent past medical history.    Past Surgical History:  Past Surgical History:   Procedure Laterality Date   • ANAL FISTULA REPAIR     •  SECTION     • DILATATION AND CURETTAGE          Social History:   Social History   Substance Use Topics   • Smoking status: Never Smoker   • Smokeless tobacco: Not on file   • Alcohol use No        Family History:  History reviewed. No pertinent family history.    Home Meds:  Prescriptions Prior to Admission   Medication Sig Dispense Refill Last Dose   • mesalamine (PENTASA) 500 MG CR capsule Take 2 capsules by mouth 3 (Three) Times a Day. 180 capsule 5        Current Meds:  Current Facility-Administered Medications   Medication Dose Route Frequency Provider Last Rate Last Dose   • ampicillin-sulbactam 3 g/100 mL 0.9% NS (MBP)  3 g Intravenous Q6H Damaris Billy MD   Stopped at 11/15/17 0937   • Morphine sulfate (PF) injection 2 mg  2 mg Intravenous Q2H PRN Ben KAYE MD   2 mg at 11/15/17 0751   • ondansetron (ZOFRAN) injection 4 mg  4 mg Intravenous Q4H PRN Ben KAYE MD       • promethazine (PHENERGAN) injection 12.5 mg  12.5 mg Intravenous Once Ben KAYE MD       • sodium chloride 0.9 % bolus 1,000 mL  1,000 mL Intravenous Once Ben KAYE MD   1,000 mL at 11/15/17 1040   • sodium chloride 0.9 % flush 10 mL  10 mL Intravenous PRN Ben KAYE MD       • sodium chloride 0.9 % infusion  125 mL/hr Intravenous Continuous Ben KAYE  mL/hr at 11/15/17 0544 125 mL/hr at 11/15/17 0544       Allergies:  Allergies   Allergen Reactions   • Guaifenesin Er Hives       Review of Systems   Review of Systems   Constitutional: Negative for appetite change, chills, fatigue, fever and unexpected weight change.   HENT: Negative for sore throat and trouble swallowing.    Respiratory: Negative for cough, chest tightness, shortness of breath and wheezing.    Cardiovascular: Negative for chest pain  and palpitations.   Gastrointestinal: Positive for abdominal distention, abdominal pain, nausea and vomiting. Negative for anal bleeding, blood in stool, constipation, diarrhea and rectal pain.        As mentioned in hpi   Genitourinary: Negative for difficulty urinating and hematuria.   Musculoskeletal: Negative for arthralgias and back pain.   Skin: Negative for color change and rash.   Neurological: Negative for dizziness, syncope, light-headedness and headaches.   Psychiatric/Behavioral: Negative for confusion. The patient is not nervous/anxious.         Objective     Vital Signs  Temp:  [97.9 °F (36.6 °C)-99.1 °F (37.3 °C)] 99.1 °F (37.3 °C)  Heart Rate:  [] 103  Resp:  [12-20] 16  BP: ()/(54-85) 98/65    Physical Exam:   Physical Exam   Constitutional: She appears well-developed and well-nourished. No distress.   HENT:   Head: Normocephalic and atraumatic.   Eyes: EOM are normal. No scleral icterus.   Neck: Neck supple. No JVD present.   Cardiovascular: Normal rate, regular rhythm and normal heart sounds.    Pulmonary/Chest: Effort normal and breath sounds normal. No stridor.   Abdominal: Soft. Bowel sounds are normal. She exhibits no distension and no mass. There is tenderness (Mild tenderness right lower quadrant). There is no rebound and no guarding.   Abdomen is nondistended and bowel sounds hypoactive   Musculoskeletal: She exhibits no edema.   Neurological: She is alert.   Skin: Skin is warm and dry. No rash noted.   Psychiatric: She has a normal mood and affect. Her behavior is normal.   Vitals reviewed.      Results Review:   I reviewed the patient's new clinical results.  I reviewed the patient's new imaging results and agree with the interpretation.  I reviewed the patient's other test results and agree with the interpretation    Lab Results (most recent)     Procedure Component Value Units Date/Time    CBC & Differential [[id number]] Collected:  11/14/17 2126    Specimen:  Blood Updated:   11/14/17 2132    Narrative:       The following orders were created for panel order CBC & Differential.  Procedure                               Abnormality         Status                     ---------                               -----------         ------                     CBC Auto Differential[[id number]]         Abnormal            Final result                 Please view results for these tests on the individual orders.    CBC Auto Differential [[id number]]  (Abnormal) Collected:  11/14/17 2126    Specimen:  Blood Updated:  11/14/17 2132     WBC 26.02 (H) 10*3/mm3      RBC 4.65 10*6/mm3      Hemoglobin 13.6 g/dL      Hematocrit 40.8 %      MCV 87.7 fL      MCH 29.2 pg      MCHC 33.3 g/dL      RDW 14.0 %      RDW-SD 44.2 fl      MPV 10.1 fL      Platelets 395 10*3/mm3      Neutrophil % 89.9 (H) %      Lymphocyte % 4.3 (L) %      Monocyte % 5.0 %      Eosinophil % 0.2 %      Basophil % 0.1 %      Immature Grans % 0.5 %      Neutrophils, Absolute 23.38 (H) 10*3/mm3      Lymphocytes, Absolute 1.12 10*3/mm3      Monocytes, Absolute 1.31 (H) 10*3/mm3      Eosinophils, Absolute 0.05 10*3/mm3      Basophils, Absolute 0.03 10*3/mm3      Immature Grans, Absolute 0.13 (H) 10*3/mm3     Comprehensive Metabolic Panel [[id number]]  (Abnormal) Collected:  11/14/17 2126    Specimen:  Blood Updated:  11/14/17 2142     Glucose 112 (H) mg/dL      BUN 17 mg/dL      Creatinine 0.73 mg/dL      Sodium 141 mmol/L      Potassium 4.1 mmol/L      Chloride 102 mmol/L      CO2 25.0 mmol/L      Calcium 10.1 mg/dL      Total Protein 8.2 g/dL      Albumin 4.70 g/dL      ALT (SGPT) 37 U/L      AST (SGOT) 34 U/L      Alkaline Phosphatase 108 U/L      Total Bilirubin 0.4 mg/dL      eGFR Non African Amer 91 mL/min/1.73      Globulin 3.5 gm/dL      A/G Ratio 1.3 g/dL      BUN/Creatinine Ratio 23.3     Anion Gap 14.0 (H) mmol/L     Lipase [[id number]]  (Normal) Collected:  11/14/17 2126    Specimen:  Blood Updated:  11/14/17 2142     Lipase 43 U/L      [place] Draw [[id number]] Collected:  11/14/17 2126    Specimen:  Blood Updated:  11/14/17 2231    Narrative:       The following orders were created for panel order [place] Draw.  Procedure                               Abnormality         Status                     ---------                               -----------         ------                     Light Blue Top[[id number]]                                    Final result               Green Top (Gel)[[id number]]                                   Final result               Lavender Top[[id number]]                                      Final result               Red Top[[id number]]                                           Final result                 Please view results for these tests on the individual orders.    Light Blue Top [[id number]] Collected:  11/14/17 2126    Specimen:  Blood Updated:  11/14/17 2231     Extra Tube hold for add-on      Auto resulted       Green Top (Gel) [[id number]] Collected:  11/14/17 2126    Specimen:  Blood Updated:  11/14/17 2231     Extra Tube Hold for add-ons.      Auto resulted.       Lavender Top [[id number]] Collected:  11/14/17 2126    Specimen:  Blood Updated:  11/14/17 2231     Extra Tube hold for add-on      Auto resulted       Red Top [[id number]] Collected:  11/14/17 2126    Specimen:  Blood Updated:  11/14/17 2231     Extra Tube Hold for add-ons.      Auto resulted.       Pregnancy, Urine - Urine, Clean Catch [[id number]]  (Normal) Collected:  11/14/17 2340    Specimen:  Urine from Urine, Clean Catch Updated:  11/15/17 0428     HCG, Urine QL Negative    Urine Culture - Urine, Urine, Clean Catch [[id number]] Collected:  11/14/17 2340    Specimen:  Urine from Urine, Clean Catch Updated:  11/15/17 0557    Urinalysis With / Culture If Indicated - Urine, Clean Catch [[id number]]  (Abnormal) Collected:  11/14/17 2340    Specimen:  Urine from Urine, Clean Catch Updated:  11/15/17 0559     Color, UA Dark Yellow (A)     Appearance, UA Cloudy (A)      pH, UA 5.5     Specific Gravity, UA >1.030 (H)     Glucose, UA Negative     Ketones, UA Trace (A)     Bilirubin, UA Small (1+) (A)     Blood, UA Negative     Protein, UA Trace (A)     Leuk Esterase, UA Trace (A)     Nitrite, UA Negative     Urobilinogen, UA 1.0 E.U./dL    Urinalysis, Microscopic Only - Urine, Clean Catch [[id number]]  (Abnormal) Collected:  11/14/17 2340    Specimen:  Urine from Urine, Clean Catch Updated:  11/15/17 0559     RBC, UA 3-5 (A) /HPF      WBC, UA 3-5 (A) /HPF      Bacteria, UA 1+ (A) /HPF      Squamous Epithelial Cells, UA 7-12 (A) /HPF      Mucus, UA Large/3+ (A) /HPF      Methodology Automated Microscopy          Radiology:  Imaging Results (last 24 hours)     Procedure Component Value Units Date/Time    CT Abdomen Pelvis With Contrast [[id number]] Collected:  11/15/17 0725     Updated:  11/15/17 0737    Narrative:       EXAMINATION:  CT ABDOMEN PELVIS W CONTRAST-  11/14/2017 2:09 AM CST     HISTORY: Abdominal pain with nausea and vomiting. White blood cell count  of 26,020. There is a history of ulcerative colitis and anal fistula  repair.     TECHNIQUE: Spiral CT was performed of the abdomen and pelvis with  contrast. Multiplanar images were reconstructed.     DLP: 277 mGy-cm. Automated dosage control was utilized.     COMPARISON: No comparison study.      LUNG BASES: Minimal dependent atelectasis.     LIVER AND SPLEEN: There is fatty infiltration of the liver. Mild  distention of the gallbladder measuring 4.4 cm in short axis diameter.  Unremarkable spleen.     PANCREAS: No pancreatic mass or inflammatory change.     KIDNEYS AND ADRENALS: The kidneys and adrenal glands are unremarkable.  No bladder abnormality is detected.     BOWEL: The stomach and small bowel are distended with fluid with  multiple air-fluid levels. There is a moderately long segment of  abnormal distal ileum with wall thickening and stranding of the fat  around the distal small bowel. This extends to the ileocecal  valve.  Small bowel measures up to 4.6 cm in diameter. The colon is decompressed  from the splenic flexure distally. The appendix is normal.     OTHER: There is a small amount of free fluid in the pelvis. There has  been prior tubal ligation. The uterus and ovaries demonstrate a normal  CT appearance. 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       Impression:       1. There is wall thickening of a fairly long segment of distal ileum  extending all the way to the ileocecal valve. Small bowel is dilated  proximal to the area of thickening and measures up to 4.6 cm in  diameter. There is gastric distention with fluid. There are air-fluid  levels throughout the dilated small bowel. There is decompression of the  colon from the splenic flexure distally. These findings are consistent  with inflammation of the distal ileum. This would be a common finding in  patients with Crohn's disease. Ulcerative colitis with backwash ileitis  is possible. Yersinia infection and other infectious or inflammatory  diseases are also in the differential diagnosis.  2. Small amount of free fluid likely related to the above findings.  3. Fatty infiltration of the liver.        This report was finalized on 11/15/2017 07:34 by Dr. Kuldip Dent MD.            Assessment/Plan     Active Problems:    Bowel obstruction    Crohn's disease of small intestine            I discussed the patients findings and my recommendations with patient      EMR Dragon/transcription disclaimer:  Much of this encounter note is electronic transcription/translation of spoken language to printed text.  The electronic translation of spoken language may be erroneous, or at times, nonsensical words or phrases may be inadvertently transcribed.  Although I have reviewed the note for such errors, some may still exist.    Karla Choi APRN 10:38 AM    COLLIN Francis  11/15/17  10:43 AM               Electronically signed by Karla Choi  APRN at 11/15/2017 12:10 PM      Karlajose Choi, COLLIN at 11/15/2017 10:38 AM  Version 2 of 3         [facility] Gastroenterology  Initial Inpatient Consult Note    Referring Provider: Damaris Billy MD    Reason for Consultation: abnormal ct , bowel obstruction,crohns     Subjective     History of present illness:         32-year-old female admitted with bowel obstruction and dehydration.  She has history of Crohn's disease followed by Dr. Solis.  Last office visit with Dr. Solis was 2016.  Next appointment is 2018.  She had a colonoscopy report  2016 that showed aphtha in the terminal ileum and recall was recommended 2 years.  She was diagnosed with Crohn's disease  after perirectal abscess.  She has been on Pentasa since then.  Takes Pentasa 500 mg 2 pills 3 times daily.  She has never taken steroids.  No history of bowel surgery.   Her problems started yesterday with abdominal pain as well as nausea vomiting abdominal bloating.  Typically she moves her bowels daily.  No diarrhea recently.  Had been having 2 bowel movements daily.  She has not had any rectal bleeding.  No fevers or chills.  No unintentional weight loss.  No skin rash.  No joint pain.      CT scan abdomen and pelvis with contrast notes wall thickening of the distal ileum extending all the way to the ileocecal valve, small bowel dilated ox multiple area of thickening and measures up to 4.6 cm in diameter, gastric distention with fluid noted.. Platelets of count on admission 26, hemoglobin 13.6.      She has history of small bowel obstruction treated conservatively at [facility], records under media.  This was 2017.            Past Medical History:  History reviewed. No pertinent past medical history.    Past Surgical History:  Past Surgical History:   Procedure Laterality Date   • ANAL FISTULA REPAIR     •  SECTION     • DILATATION AND CURETTAGE          Social History:   Social History    Substance Use Topics   • Smoking status: Never Smoker   • Smokeless tobacco: Not on file   • Alcohol use No        Family History:  History reviewed. No pertinent family history.    Home Meds:  Prescriptions Prior to Admission   Medication Sig Dispense Refill Last Dose   • mesalamine (PENTASA) 500 MG CR capsule Take 2 capsules by mouth 3 (Three) Times a Day. 180 capsule 5        Current Meds:  Current Facility-Administered Medications   Medication Dose Route Frequency Provider Last Rate Last Dose   • ampicillin-sulbactam 3 g/100 mL 0.9% NS (MBP)  3 g Intravenous Q6H Damaris Billy MD   Stopped at 11/15/17 0937   • Morphine sulfate (PF) injection 2 mg  2 mg Intravenous Q2H PRN Ben KAYE MD   2 mg at 11/15/17 0751   • ondansetron (ZOFRAN) injection 4 mg  4 mg Intravenous Q4H PRN Ben KAYE MD       • promethazine (PHENERGAN) injection 12.5 mg  12.5 mg Intravenous Once Ben KAYE MD       • sodium chloride 0.9 % bolus 1,000 mL  1,000 mL Intravenous Once Ben KAYE MD   1,000 mL at 11/15/17 1040   • sodium chloride 0.9 % flush 10 mL  10 mL Intravenous PRN Ben KAYE MD       • sodium chloride 0.9 % infusion  125 mL/hr Intravenous Continuous Ben KAYE  mL/hr at 11/15/17 0544 125 mL/hr at 11/15/17 0544       Allergies:  Allergies   Allergen Reactions   • Guaifenesin Er Hives       Review of Systems   Review of Systems   Constitutional: Negative for appetite change, chills, fatigue, fever and unexpected weight change.   HENT: Negative for sore throat and trouble swallowing.    Respiratory: Negative for cough, chest tightness, shortness of breath and wheezing.    Cardiovascular: Negative for chest pain and palpitations.   Gastrointestinal: Positive for abdominal distention, abdominal pain, nausea and vomiting. Negative for anal bleeding, blood in stool, constipation, diarrhea and rectal pain.        As mentioned in hpi   Genitourinary: Negative for difficulty urinating and hematuria.    Musculoskeletal: Negative for arthralgias and back pain.   Skin: Negative for color change and rash.   Neurological: Negative for dizziness, syncope, light-headedness and headaches.   Psychiatric/Behavioral: Negative for confusion. The patient is not nervous/anxious.         Objective     Vital Signs  Temp:  [97.9 °F (36.6 °C)-99.1 °F (37.3 °C)] 99.1 °F (37.3 °C)  Heart Rate:  [] 103  Resp:  [12-20] 16  BP: ()/(54-85) 98/65    Physical Exam:   Physical Exam   Constitutional: She appears well-developed and well-nourished. No distress.   HENT:   Head: Normocephalic and atraumatic.   Eyes: EOM are normal. No scleral icterus.   Neck: Neck supple. No JVD present.   Cardiovascular: Normal rate, regular rhythm and normal heart sounds.    Pulmonary/Chest: Effort normal and breath sounds normal. No stridor.   Abdominal: Soft. Bowel sounds are normal. She exhibits no distension and no mass. There is tenderness (Mild tenderness right lower quadrant). There is no rebound and no guarding.   Musculoskeletal: She exhibits no edema.   Neurological: She is alert.   Skin: Skin is warm and dry. No rash noted.   Psychiatric: She has a normal mood and affect. Her behavior is normal.   Vitals reviewed.      Results Review:   I reviewed the patient's new clinical results.  I reviewed the patient's new imaging results and agree with the interpretation.  I reviewed the patient's other test results and agree with the interpretation    Lab Results (most recent)     Procedure Component Value Units Date/Time    CBC & Differential [[id number]] Collected:  11/14/17 2126    Specimen:  Blood Updated:  11/14/17 2132    Narrative:       The following orders were created for panel order CBC & Differential.  Procedure                               Abnormality         Status                     ---------                               -----------         ------                     CBC Auto Differential[[id number]]         Abnormal             Final result                 Please view results for these tests on the individual orders.    CBC Auto Differential [[id number]]  (Abnormal) Collected:  11/14/17 2126    Specimen:  Blood Updated:  11/14/17 2132     WBC 26.02 (H) 10*3/mm3      RBC 4.65 10*6/mm3      Hemoglobin 13.6 g/dL      Hematocrit 40.8 %      MCV 87.7 fL      MCH 29.2 pg      MCHC 33.3 g/dL      RDW 14.0 %      RDW-SD 44.2 fl      MPV 10.1 fL      Platelets 395 10*3/mm3      Neutrophil % 89.9 (H) %      Lymphocyte % 4.3 (L) %      Monocyte % 5.0 %      Eosinophil % 0.2 %      Basophil % 0.1 %      Immature Grans % 0.5 %      Neutrophils, Absolute 23.38 (H) 10*3/mm3      Lymphocytes, Absolute 1.12 10*3/mm3      Monocytes, Absolute 1.31 (H) 10*3/mm3      Eosinophils, Absolute 0.05 10*3/mm3      Basophils, Absolute 0.03 10*3/mm3      Immature Grans, Absolute 0.13 (H) 10*3/mm3     Comprehensive Metabolic Panel [[id number]]  (Abnormal) Collected:  11/14/17 2126    Specimen:  Blood Updated:  11/14/17 2142     Glucose 112 (H) mg/dL      BUN 17 mg/dL      Creatinine 0.73 mg/dL      Sodium 141 mmol/L      Potassium 4.1 mmol/L      Chloride 102 mmol/L      CO2 25.0 mmol/L      Calcium 10.1 mg/dL      Total Protein 8.2 g/dL      Albumin 4.70 g/dL      ALT (SGPT) 37 U/L      AST (SGOT) 34 U/L      Alkaline Phosphatase 108 U/L      Total Bilirubin 0.4 mg/dL      eGFR Non African Amer 91 mL/min/1.73      Globulin 3.5 gm/dL      A/G Ratio 1.3 g/dL      BUN/Creatinine Ratio 23.3     Anion Gap 14.0 (H) mmol/L     Lipase [[id number]]  (Normal) Collected:  11/14/17 2126    Specimen:  Blood Updated:  11/14/17 2142     Lipase 43 U/L     [place] Draw [[id number]] Collected:  11/14/17 2126    Specimen:  Blood Updated:  11/14/17 2231    Narrative:       The following orders were created for panel order [place] Draw.  Procedure                               Abnormality         Status                     ---------                               -----------         ------                      Light Blue Top[[id number]]                                    Final result               Green Top (Gel)[[id number]]                                   Final result               Lavender Top[[id number]]                                      Final result               Red Top[[id number]]                                           Final result                 Please view results for these tests on the individual orders.    Light Blue Top [[id number]] Collected:  11/14/17 2126    Specimen:  Blood Updated:  11/14/17 2231     Extra Tube hold for add-on      Auto resulted       Green Top (Gel) [[id number]] Collected:  11/14/17 2126    Specimen:  Blood Updated:  11/14/17 2231     Extra Tube Hold for add-ons.      Auto resulted.       Lavender Top [[id number]] Collected:  11/14/17 2126    Specimen:  Blood Updated:  11/14/17 2231     Extra Tube hold for add-on      Auto resulted       Red Top [[id number]] Collected:  11/14/17 2126    Specimen:  Blood Updated:  11/14/17 2231     Extra Tube Hold for add-ons.      Auto resulted.       Pregnancy, Urine - Urine, Clean Catch [[id number]]  (Normal) Collected:  11/14/17 2340    Specimen:  Urine from Urine, Clean Catch Updated:  11/15/17 0428     HCG, Urine QL Negative    Urine Culture - Urine, Urine, Clean Catch [[id number]] Collected:  11/14/17 2340    Specimen:  Urine from Urine, Clean Catch Updated:  11/15/17 0557    Urinalysis With / Culture If Indicated - Urine, Clean Catch [[id number]]  (Abnormal) Collected:  11/14/17 2340    Specimen:  Urine from Urine, Clean Catch Updated:  11/15/17 0559     Color, UA Dark Yellow (A)     Appearance, UA Cloudy (A)     pH, UA 5.5     Specific Gravity, UA >1.030 (H)     Glucose, UA Negative     Ketones, UA Trace (A)     Bilirubin, UA Small (1+) (A)     Blood, UA Negative     Protein, UA Trace (A)     Leuk Esterase, UA Trace (A)     Nitrite, UA Negative     Urobilinogen, UA 1.0 E.U./dL    Urinalysis, Microscopic Only - Urine, Clean Catch [[id number]]   (Abnormal) Collected:  11/14/17 4500    Specimen:  Urine from Urine, Clean Catch Updated:  11/15/17 0559     RBC, UA 3-5 (A) /HPF      WBC, UA 3-5 (A) /HPF      Bacteria, UA 1+ (A) /HPF      Squamous Epithelial Cells, UA 7-12 (A) /HPF      Mucus, UA Large/3+ (A) /HPF      Methodology Automated Microscopy          Radiology:  Imaging Results (last 24 hours)     Procedure Component Value Units Date/Time    CT Abdomen Pelvis With Contrast [[id number]] Collected:  11/15/17 0725     Updated:  11/15/17 0737    Narrative:       EXAMINATION:  CT ABDOMEN PELVIS W CONTRAST-  11/14/2017 2:09 AM CST     HISTORY: Abdominal pain with nausea and vomiting. White blood cell count  of 26,020. There is a history of ulcerative colitis and anal fistula  repair.     TECHNIQUE: Spiral CT was performed of the abdomen and pelvis with  contrast. Multiplanar images were reconstructed.     DLP: 277 mGy-cm. Automated dosage control was utilized.     COMPARISON: No comparison study.      LUNG BASES: Minimal dependent atelectasis.     LIVER AND SPLEEN: There is fatty infiltration of the liver. Mild  distention of the gallbladder measuring 4.4 cm in short axis diameter.  Unremarkable spleen.     PANCREAS: No pancreatic mass or inflammatory change.     KIDNEYS AND ADRENALS: The kidneys and adrenal glands are unremarkable.  No bladder abnormality is detected.     BOWEL: The stomach and small bowel are distended with fluid with  multiple air-fluid levels. There is a moderately long segment of  abnormal distal ileum with wall thickening and stranding of the fat  around the distal small bowel. This extends to the ileocecal valve.  Small bowel measures up to 4.6 cm in diameter. The colon is decompressed  from the splenic flexure distally. The appendix is normal.     OTHER: There is a small amount of free fluid in the pelvis. There has  been prior tubal ligation. The uterus and ovaries demonstrate a normal  CT appearance. There are bilateral pars defects  at L5 with minimal  spondylolisthesis.       Impression:       1. There is wall thickening of a fairly long segment of distal ileum  extending all the way to the ileocecal valve. Small bowel is dilated  proximal to the area of thickening and measures up to 4.6 cm in  diameter. There is gastric distention with fluid. There are air-fluid  levels throughout the dilated small bowel. There is decompression of the  colon from the splenic flexure distally. These findings are consistent  with inflammation of the distal ileum. This would be a common finding in  patients with Crohn's disease. Ulcerative colitis with backwash ileitis  is possible. Yersinia infection and other infectious or inflammatory  diseases are also in the differential diagnosis.  2. Small amount of free fluid likely related to the above findings.  3. Fatty infiltration of the liver.        This report was finalized on 11/15/2017 07:34 by Dr. Kuldip Dent MD.            Assessment/Plan     Active Problems:    Bowel obstruction    Crohn's disease of small intestine            I discussed the patients findings and my recommendations with patient      EMR Dragon/transcription disclaimer:  Much of this encounter note is electronic transcription/translation of spoken language to printed text.  The electronic translation of spoken language may be erroneous, or at times, nonsensical words or phrases may be inadvertently transcribed.  Although I have reviewed the note for such errors, some may still exist.    Karla POLANCO 10:38 AM    COLLIN Francis  11/15/17  10:43 AM               Electronically signed by COLLIN Villegas at 11/15/2017 10:46 AM      COLLIN Villegas at 11/15/2017 10:38 AM  Version 1 of 3         [facility] Gastroenterology  Initial Inpatient Consult Note    Referring Provider: Damaris Billy MD    Reason for Consultation: abnormal ct , bowel obstruction,crohns     Subjective     History of present illness:          32-year-old female admitted with bowel obstruction and dehydration.  She has history of Crohn's disease followed by Dr. Solis.  Last office visit with Dr. Solis was favored .  She had a colonoscopy report thousand 16 that showed aphtha in the terminal ileum and recall was recommended 2 years.  She was diagnosed with Crohn's disease  after perirectal abscess.  She has been on Pentasa since then.  Takes Pentasa 500 mg 2 pills 3 times daily.  She has never taken steroids.  No history of bowel surgery.   Her problems started yesterday with abdominal pain as well as nausea vomiting abdominal bloating.  Typically she moves her bowels daily.  No diarrhea recently.  Had been having 2 bowel movements daily.  She has not had any rectal bleeding.  No fevers or chills.  No unintentional weight loss.  No skin rash.  No joint pain.      CT scan abdomen and pelvis with contrast notes wall thickening of the distal ileum extending all the way to the ileocecal valve, small bowel dilated ox multiple area of thickening and measures up to 4.6 cm in diameter, gastric distention with fluid noted.. Platelets of count on admission 26, hemoglobin 13.6.        Past Medical History:  History reviewed. No pertinent past medical history.    Past Surgical History:  Past Surgical History:   Procedure Laterality Date   • ANAL FISTULA REPAIR     •  SECTION     • DILATATION AND CURETTAGE          Social History:   Social History   Substance Use Topics   • Smoking status: Never Smoker   • Smokeless tobacco: Not on file   • Alcohol use No        Family History:  History reviewed. No pertinent family history.    Home Meds:  Prescriptions Prior to Admission   Medication Sig Dispense Refill Last Dose   • mesalamine (PENTASA) 500 MG CR capsule Take 2 capsules by mouth 3 (Three) Times a Day. 180 capsule 5        Current Meds:  Current Facility-Administered Medications   Medication Dose Route Frequency Provider Last Rate Last Dose   •  ampicillin-sulbactam 3 g/100 mL 0.9% NS (MBP)  3 g Intravenous Q6H Damaris Billy MD   Stopped at 11/15/17 0937   • Morphine sulfate (PF) injection 2 mg  2 mg Intravenous Q2H PRN Ben KAYE MD   2 mg at 11/15/17 0751   • ondansetron (ZOFRAN) injection 4 mg  4 mg Intravenous Q4H PRN Ben KAYE MD       • promethazine (PHENERGAN) injection 12.5 mg  12.5 mg Intravenous Once Ben KAYE MD       • sodium chloride 0.9 % bolus 1,000 mL  1,000 mL Intravenous Once Ben KAYE MD   1,000 mL at 11/15/17 1040   • sodium chloride 0.9 % flush 10 mL  10 mL Intravenous PRN Ben KAYE MD       • sodium chloride 0.9 % infusion  125 mL/hr Intravenous Continuous Ben KAYE  mL/hr at 11/15/17 0544 125 mL/hr at 11/15/17 0544       Allergies:  Allergies   Allergen Reactions   • Guaifenesin Er Hives       Review of Systems   Review of Systems   Constitutional: Negative for appetite change, chills, fatigue, fever and unexpected weight change.   HENT: Negative for sore throat and trouble swallowing.    Respiratory: Negative for cough, chest tightness, shortness of breath and wheezing.    Cardiovascular: Negative for chest pain and palpitations.   Gastrointestinal: Positive for abdominal distention, abdominal pain, nausea and vomiting. Negative for anal bleeding, blood in stool, constipation, diarrhea and rectal pain.        As mentioned in hpi   Genitourinary: Negative for difficulty urinating and hematuria.   Musculoskeletal: Negative for arthralgias and back pain.   Skin: Negative for color change and rash.   Neurological: Negative for dizziness, syncope, light-headedness and headaches.   Psychiatric/Behavioral: Negative for confusion. The patient is not nervous/anxious.         Objective     Vital Signs  Temp:  [97.9 °F (36.6 °C)-99.1 °F (37.3 °C)] 99.1 °F (37.3 °C)  Heart Rate:  [] 103  Resp:  [12-20] 16  BP: ()/(54-85) 98/65    Physical Exam:   Physical Exam   Constitutional: She appears  well-developed and well-nourished. No distress.   HENT:   Head: Normocephalic and atraumatic.   Eyes: EOM are normal. No scleral icterus.   Neck: Neck supple. No JVD present.   Cardiovascular: Normal rate, regular rhythm and normal heart sounds.    Pulmonary/Chest: Effort normal and breath sounds normal. No stridor.   Abdominal: Soft. Bowel sounds are normal. She exhibits no distension and no mass. There is tenderness (Mild tenderness right lower quadrant). There is no rebound and no guarding.   Musculoskeletal: She exhibits no edema.   Neurological: She is alert.   Skin: Skin is warm and dry. No rash noted.   Psychiatric: She has a normal mood and affect. Her behavior is normal.   Vitals reviewed.      Results Review:   I reviewed the patient's new clinical results.  I reviewed the patient's new imaging results and agree with the interpretation.  I reviewed the patient's other test results and agree with the interpretation    Lab Results (most recent)     Procedure Component Value Units Date/Time    CBC & Differential [[id number]] Collected:  11/14/17 2126    Specimen:  Blood Updated:  11/14/17 2132    Narrative:       The following orders were created for panel order CBC & Differential.  Procedure                               Abnormality         Status                     ---------                               -----------         ------                     CBC Auto Differential[[id number]]         Abnormal            Final result                 Please view results for these tests on the individual orders.    CBC Auto Differential [[id number]]  (Abnormal) Collected:  11/14/17 2126    Specimen:  Blood Updated:  11/14/17 2132     WBC 26.02 (H) 10*3/mm3      RBC 4.65 10*6/mm3      Hemoglobin 13.6 g/dL      Hematocrit 40.8 %      MCV 87.7 fL      MCH 29.2 pg      MCHC 33.3 g/dL      RDW 14.0 %      RDW-SD 44.2 fl      MPV 10.1 fL      Platelets 395 10*3/mm3      Neutrophil % 89.9 (H) %      Lymphocyte % 4.3 (L) %       Monocyte % 5.0 %      Eosinophil % 0.2 %      Basophil % 0.1 %      Immature Grans % 0.5 %      Neutrophils, Absolute 23.38 (H) 10*3/mm3      Lymphocytes, Absolute 1.12 10*3/mm3      Monocytes, Absolute 1.31 (H) 10*3/mm3      Eosinophils, Absolute 0.05 10*3/mm3      Basophils, Absolute 0.03 10*3/mm3      Immature Grans, Absolute 0.13 (H) 10*3/mm3     Comprehensive Metabolic Panel [[id number]]  (Abnormal) Collected:  11/14/17 2126    Specimen:  Blood Updated:  11/14/17 2142     Glucose 112 (H) mg/dL      BUN 17 mg/dL      Creatinine 0.73 mg/dL      Sodium 141 mmol/L      Potassium 4.1 mmol/L      Chloride 102 mmol/L      CO2 25.0 mmol/L      Calcium 10.1 mg/dL      Total Protein 8.2 g/dL      Albumin 4.70 g/dL      ALT (SGPT) 37 U/L      AST (SGOT) 34 U/L      Alkaline Phosphatase 108 U/L      Total Bilirubin 0.4 mg/dL      eGFR Non African Amer 91 mL/min/1.73      Globulin 3.5 gm/dL      A/G Ratio 1.3 g/dL      BUN/Creatinine Ratio 23.3     Anion Gap 14.0 (H) mmol/L     Lipase [[id number]]  (Normal) Collected:  11/14/17 2126    Specimen:  Blood Updated:  11/14/17 2142     Lipase 43 U/L     [facility] Draw [[id number]] Collected:  11/14/17 2126    Specimen:  Blood Updated:  11/14/17 2231    Narrative:       The following orders were created for panel order [facility] Draw.  Procedure                               Abnormality         Status                     ---------                               -----------         ------                     Light Blue Top[[id number]]                                    Final result               Green Top (Gel)[[id number]]                                   Final result               Lavender Top[[id number]]                                      Final result               Red Top[[id number]]                                           Final result                 Please view results for these tests on the individual orders.    Light Blue Top [[id number]] Collected:  11/14/17 2126    Specimen:  Blood  Updated:  11/14/17 2231     Extra Tube hold for add-on      Auto resulted       Green Top (Gel) [[id number]] Collected:  11/14/17 2126    Specimen:  Blood Updated:  11/14/17 2231     Extra Tube Hold for add-ons.      Auto resulted.       Lavender Top [[id number]] Collected:  11/14/17 2126    Specimen:  Blood Updated:  11/14/17 2231     Extra Tube hold for add-on      Auto resulted       Red Top [[id number]] Collected:  11/14/17 2126    Specimen:  Blood Updated:  11/14/17 2231     Extra Tube Hold for add-ons.      Auto resulted.       Pregnancy, Urine - Urine, Clean Catch [[id number]]  (Normal) Collected:  11/14/17 2340    Specimen:  Urine from Urine, Clean Catch Updated:  11/15/17 0428     HCG, Urine QL Negative    Urine Culture - Urine, Urine, Clean Catch [[id number]] Collected:  11/14/17 2340    Specimen:  Urine from Urine, Clean Catch Updated:  11/15/17 0557    Urinalysis With / Culture If Indicated - Urine, Clean Catch [[id number]]  (Abnormal) Collected:  11/14/17 2340    Specimen:  Urine from Urine, Clean Catch Updated:  11/15/17 0559     Color, UA Dark Yellow (A)     Appearance, UA Cloudy (A)     pH, UA 5.5     Specific Gravity, UA >1.030 (H)     Glucose, UA Negative     Ketones, UA Trace (A)     Bilirubin, UA Small (1+) (A)     Blood, UA Negative     Protein, UA Trace (A)     Leuk Esterase, UA Trace (A)     Nitrite, UA Negative     Urobilinogen, UA 1.0 E.U./dL    Urinalysis, Microscopic Only - Urine, Clean Catch [[id number]]  (Abnormal) Collected:  11/14/17 2340    Specimen:  Urine from Urine, Clean Catch Updated:  11/15/17 0559     RBC, UA 3-5 (A) /HPF      WBC, UA 3-5 (A) /HPF      Bacteria, UA 1+ (A) /HPF      Squamous Epithelial Cells, UA 7-12 (A) /HPF      Mucus, UA Large/3+ (A) /HPF      Methodology Automated Microscopy          Radiology:  Imaging Results (last 24 hours)     Procedure Component Value Units Date/Time    CT Abdomen Pelvis With Contrast [[id number]] Collected:  11/15/17 0725     Updated:  11/15/17 0737     Narrative:       EXAMINATION:  CT ABDOMEN PELVIS W CONTRAST-  11/14/2017 2:09 AM CST     HISTORY: Abdominal pain with nausea and vomiting. White blood cell count  of 26,020. There is a history of ulcerative colitis and anal fistula  repair.     TECHNIQUE: Spiral CT was performed of the abdomen and pelvis with  contrast. Multiplanar images were reconstructed.     DLP: 277 mGy-cm. Automated dosage control was utilized.     COMPARISON: No comparison study.      LUNG BASES: Minimal dependent atelectasis.     LIVER AND SPLEEN: There is fatty infiltration of the liver. Mild  distention of the gallbladder measuring 4.4 cm in short axis diameter.  Unremarkable spleen.     PANCREAS: No pancreatic mass or inflammatory change.     KIDNEYS AND ADRENALS: The kidneys and adrenal glands are unremarkable.  No bladder abnormality is detected.     BOWEL: The stomach and small bowel are distended with fluid with  multiple air-fluid levels. There is a moderately long segment of  abnormal distal ileum with wall thickening and stranding of the fat  around the distal small bowel. This extends to the ileocecal valve.  Small bowel measures up to 4.6 cm in diameter. The colon is decompressed  from the splenic flexure distally. The appendix is normal.     OTHER: There is a small amount of free fluid in the pelvis. There has  been prior tubal ligation. The uterus and ovaries demonstrate a normal  CT appearance. 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       Impression:       1. There is wall thickening of a fairly long segment of distal ileum  extending all the way to the ileocecal valve. Small bowel is dilated  proximal to the area of thickening and measures up to 4.6 cm in  diameter. There is gastric distention with fluid. There are air-fluid  levels throughout the dilated small bowel. There is decompression of the  colon from the splenic flexure distally. These findings are consistent  with inflammation of  the distal ileum. This would be a common finding in  patients with Crohn's disease. Ulcerative colitis with backwash ileitis  is possible. Yersinia infection and other infectious or inflammatory  diseases are also in the differential diagnosis.  2. Small amount of free fluid likely related to the above findings.  3. Fatty infiltration of the liver.        This report was finalized on 11/15/2017 07:34 by Dr. Kuldip Dent MD.            Assessment/Plan     Active Problems:    Bowel obstruction    Crohn's disease of small intestine            I discussed the patients findings and my recommendations with patient      EMR Dragon/transcription disclaimer:  Much of this encounter note is electronic transcription/translation of spoken language to printed text.  The electronic translation of spoken language may be erroneous, or at times, nonsensical words or phrases may be inadvertently transcribed.  Although I have reviewed the note for such errors, some may still exist.    Karla Choi APRN 10:38 AM    COLLIN Francis  11/15/17  10:43 AM          11/15  Had Morphine iv x2  So far today .      NPO       Electronically signed by COLLIN Villegas at 11/15/2017 10:43 AM

## 2017-11-15 NOTE — ED PROVIDER NOTES
Subjective   HPI Comments: The patient 32-year-old female presents to our facility via private vehicle with spouse at bedside with a complaint of abdominal pain and vomiting.  She states that she has an ulcerative colitis and applying ice on her belly helps, which she has been doing all throughout the day today.  However, her original symptoms started earlier this afternoon with cramping type of discomfort in the umbilical area of the abdomen, no specific area, no radiation, cramping was about 7 out of 10 in intensity.  It persist at this time to a pain scale 4 out of 10.    She has had 2 bouts of vomiting episodes earlier today and she has no chest pain, no shortness of breath, no fever, no headaches, no blurred vision and no neck pain.        Patient is a 32 y.o. female presenting with abdominal pain.   History provided by:  Patient and significant other   used: No    Abdominal Pain   Pain location:  Periumbilical  Pain quality: cramping    Pain radiates to:  Periumbilical region  Pain severity:  Moderate  Onset quality:  Gradual  Timing:  Unable to specify  Progression:  Unable to specify  Chronicity:  New  Relieved by:  Nothing  Worsened by:  Nothing  Ineffective treatments:  None tried  Associated symptoms: nausea and vomiting        Review of Systems   Constitutional: Negative.    HENT: Negative.    Eyes: Negative.    Respiratory: Negative.    Cardiovascular: Negative.    Gastrointestinal: Positive for abdominal pain, nausea and vomiting.   Endocrine: Negative.    Genitourinary: Negative.    Musculoskeletal: Negative.    Skin: Negative.    Allergic/Immunologic: Negative.    Neurological: Negative.    Hematological: Negative.    Psychiatric/Behavioral: Negative.    All other systems reviewed and are negative.      Past Medical History:   Diagnosis Date   • Crohn's disease        Allergies   Allergen Reactions   • Guaifenesin Er Hives       Past Surgical History:   Procedure Laterality Date    • ANAL FISTULA REPAIR     •  SECTION     • COLONOSCOPY  2016   • DILATATION AND CURETTAGE         History reviewed. No pertinent family history.    Social History     Social History   • Marital status:      Spouse name: N/A   • Number of children: N/A   • Years of education: N/A     Social History Main Topics   • Smoking status: Never Smoker   • Smokeless tobacco: None   • Alcohol use No   • Drug use: None   • Sexual activity: Not Asked     Other Topics Concern   • None     Social History Narrative           Objective   Physical Exam   Constitutional: She is oriented to person, place, and time. She appears well-developed and well-nourished.   HENT:   Head: Normocephalic and atraumatic.   Right Ear: External ear normal.   Left Ear: External ear normal.   Nose: Nose normal.   Mouth/Throat: Oropharynx is clear and moist.   Eyes: Conjunctivae and EOM are normal. Pupils are equal, round, and reactive to light.   Neck: Normal range of motion. Neck supple.   Cardiovascular: Normal rate, regular rhythm and normal heart sounds.    Pulmonary/Chest: Effort normal and breath sounds normal.   Abdominal: There is tenderness.   Cramping in umbilical area.   Musculoskeletal: Normal range of motion.   Neurological: She is alert and oriented to person, place, and time. She has normal reflexes.   Skin: Skin is warm and dry.   Psychiatric: She has a normal mood and affect. Her behavior is normal. Judgment and thought content normal.   Nursing note and vitals reviewed.      Procedures         ED Course  ED Course         hDara Carmona #[id number]  (32 y.o. F) 1             Lab Results           CBC & Differential (Final result) Result time: 17 05:18:16     Final result     Narrative:     The following orders were created for panel order CBC & Differential.  Procedure                               Abnormality         Status                     ---------                               -----------          ------                     CBC Auto Differential[[id number]]        Abnormal            Final result                 Please view results for these tests on the individual orders.               Basic Metabolic Panel (Final result)    Abnormal Component (Lab Inquiry)       Collection Time Result Time GLU BUN CREATININE NA K     11/17/17 04:48:00 11/17/17 05:35:00 94 10 0.67 142 3.9          Collection Time Result Time CL CO2 CALCIUM EGFRIFNONA BCR     11/17/17 04:48:00 11/17/17 05:35:00 109 28.0 8.1 (L) 101 14.9          Collection Time Result Time ANION GAP     11/17/17 04:48:00 11/17/17 05:35:00 5.0                 Final result     Narrative:     GFR Normal >60  Chronic Kidney Disease <60  Kidney Failure <15               CBC Auto Differential (Final result)    Abnormal Component (Lab Inquiry)       Collection Time Result Time WBC ADJ RBC HGB HCT MCV     11/17/17 04:48:00 11/17/17 05:18:00 6.63 3.23 (L) 9.4 (L) 29.2 (L) 90.4          Collection Time Result Time MCH MCHC RDW RDWSD MPV     11/17/17 04:48:00 11/17/17 05:18:00 29.1 32.2 (L) 14.4 48.2 9.9          Collection Time Result Time PLT NEUTRO PCT LYMPHO PCT MONO PCT EOS PCT     11/17/17 04:48:00 11/17/17 05:18:00 263 68.1 18.6 13.0 (H) 0.0          Collection Time Result Time BASOS PCT AUTO IG% NEUTRO ABS LYMPHS ABS MONOS ABS     11/17/17 04:48:00 11/17/17 05:18:00 0.0 0.3 4.52 1.23 0.86          Collection Time Result Time EOS ABS BASOS ABS AUTO IG#     11/17/17 04:48:00 11/17/17 05:18:00 0.00 0.00 0.02                      TSPOT (Final result) Component (Lab Inquiry)       Collection Time Result Time REFLABREPO     11/16/17 05:05:00 11/20/17 12:38:00 See Attached Report                      CBC & Differential (Final result) Result time: 11/16/17 05:30:32     Final result     Narrative:     The following orders were created for panel order CBC & Differential.  Procedure                               Abnormality         Status                     ---------                                -----------         ------                     CBC Auto Differential[[id number]]        Abnormal            Final result                 Please view results for these tests on the individual orders.               Basic Metabolic Panel (Final result)    Abnormal Component (Lab Inquiry)       Collection Time Result Time GLU BUN CREATININE NA K     11/16/17 05:05:00 11/16/17 05:47:00 98 10 0.67 143 4.1          Collection Time Result Time CL CO2 CALCIUM EGFRIFNONA BCR     11/16/17 05:05:00 11/16/17 05:47:00 105 29.0 8.1 (L) 101 14.9          Collection Time Result Time ANION GAP     11/16/17 05:05:00 11/16/17 05:47:00 9.0                 Final result     Narrative:     GFR Normal >60  Chronic Kidney Disease <60  Kidney Failure <15               C-reactive Protein (Final result)    Abnormal Component (Lab Inquiry)       Collection Time Result Time CRP     11/16/17 05:05:00 11/16/17 05:47:00 7.70 (H)                      CBC Auto Differential (Final result)    Abnormal Component (Lab Inquiry)       Collection Time Result Time WBC ADJ RBC HGB HCT MCV     11/16/17 05:05:00 11/16/17 05:30:00 6.25 3.78 (L) 10.8 (L) 34.3 (L) 90.7          Collection Time Result Time MCH MCHC RDW RDWSD MPV     11/16/17 05:05:00 11/16/17 05:30:00 28.6 31.5 (L) 14.4 47.6 9.9          Collection Time Result Time PLT NEUTRO PCT LYMPHO PCT MONO PCT EOS PCT     11/16/17 05:05:00 11/16/17 05:30:00 329 75.8 13.6 (L) 10.4 0.0          Collection Time Result Time BASOS PCT AUTO IG% NEUTRO ABS LYMPHS ABS MONOS ABS     11/16/17 05:05:00 11/16/17 05:30:00 0.0 0.2 4.74 0.85 0.65          Collection Time Result Time EOS ABS BASOS ABS AUTO IG#     11/16/17 05:05:00 11/16/17 05:30:00 0.00 0.00 0.01                      CBC & Differential (Final result) Result time: 11/15/17 11:27:38     Final result     Narrative:     The following orders were created for panel order CBC & Differential.  Procedure                                Abnormality         Status                     ---------                               -----------         ------                     CBC Auto Differential[[id number]]        Abnormal            Final result                 Please view results for these tests on the individual orders.               Sedimentation Rate (Final result) Component (Lab Inquiry)       Collection Time Result Time ESR     11/15/17 11:01:00 11/15/17 11:25:00 2                      CBC Auto Differential (Final result)    Abnormal Component (Lab Inquiry)       Collection Time Result Time WBC ADJ RBC HGB HCT MCV     11/15/17 11:01:00 11/15/17 11:27:00 10.96 (H) 4.17 (L) 12.0 37.6 90.2          Collection Time Result Time MCH MCHC RDW RDWSD MPV     11/15/17 11:01:00 11/15/17 11:27:00 28.8 31.9 (L) 14.2 46.8 10.0          Collection Time Result Time PLT NEUTRO PCT LYMPHO PCT MONO PCT EOS PCT     11/15/17 11:01:00 11/15/17 11:27:00 349 80.1 (H) 9.0 (L) 9.9 0.5          Collection Time Result Time BASOS PCT AUTO IG% NEUTRO ABS LYMPHS ABS MONOS ABS     11/15/17 11:01:00 11/15/17 11:27:00 0.1 0.4 8.79 (H) 0.99 1.08          Collection Time Result Time EOS ABS BASOS ABS AUTO IG#     11/15/17 11:01:00 11/15/17 11:27:00 0.05 0.01 0.04 (H)                      Urinalysis With / Culture If Indicated - Urine, Clean Catch (Final result)    Abnormal Component (Lab Inquiry)       Collection Time Result Time COLOR U CLARITY U PH, UR SPECGRAV U GLUCOSE U     11/14/17 23:40:00 11/15/17 05:59:00 Dark Yellow (A) Cloudy (A) 5.5 >1.030 (H) Negative          Collection Time Result Time KETONES U BILIRUBIN, UR BLOOD U PROTEIN U LEUKOCYTUR     11/14/17 23:40:00 11/15/17 05:59:00 Trace (A) Small (1+) (A) Negative Trace (A) Trace (A)          Collection Time Result Time NITRITE U URBLNGN UA     11/14/17 23:40:00 11/15/17 05:59:00 Negative 1.0 E.U./dL                      Pregnancy, Urine - Urine, Clean Catch (Final result) Component (Lab Inquiry)       Collection Time  Result Time HCG Ur     11/14/17 23:40:00 11/15/17 04:28:00 Negative                      Urine Culture - Urine, Urine, Clean Catch (Final result)    Abnormal Component (Lab Inquiry)       Collection Time Result Time CULT CULT     11/14/17 23:40:00 11/17/17 09:32:00  >100,000 CFU/mL Lactobacillus species (A)                 Final result     Narrative:     Probable contaminant               Urinalysis, Microscopic Only - Urine, Clean Catch (Final result)    Abnormal Component (Lab Inquiry)       Collection Time Result Time RBC  UA WBC UA BACTERIA SQAMEPI UA MUCUS     11/14/17 23:40:00 11/15/17 05:59:00 3-5 (A) 3-5 (A) 1+ (A) 7-12 (A) Large/3+ (A)          Collection Time Result Time Methodolog     11/14/17 23:40:00 11/15/17 05:59:00 Automated Microscopy                      [place] Draw (Final result) Result time: 11/14/17 22:31:07     Final result     Narrative:     The following orders were created for panel order [place] Draw.  Procedure                               Abnormality         Status                     ---------                               -----------         ------                     Light Blue Top[[id number]]                                    Final result               Green Top (Gel)[[id number]]                                   Final result               Lavender Top[[id number]]                                      Final result               Red Top[[id number]]                                           Final result                 Please view results for these tests on the individual orders.               CBC & Differential (Final result) Result time: 11/14/17 21:32:50     Final result     Narrative:     The following orders were created for panel order CBC & Differential.  Procedure                               Abnormality         Status                     ---------                               -----------         ------                     CBC Auto Differential[[id number]]         Abnormal             Final result                 Please view results for these tests on the individual orders.               Comprehensive Metabolic Panel (Final result)    Abnormal Component (Lab Inquiry)       Collection Time Result Time GLU BUN CREATININE NA K     11/14/17 21:26:00 11/14/17 21:42:00 112 (H) 17 0.73 141 4.1          Collection Time Result Time CL CO2 CALCIUM PROTEIN ALB     11/14/17 21:26:00 11/14/17 21:42:00 102 25.0 10.1 8.2 4.70          Collection Time Result Time ALT AST ALK PHOS BILI Total EGFRIFNONA     11/14/17 21:26:00 11/14/17 21:42:00 37 34 108 0.4 91          Collection Time Result Time GLOB A/G Ratio BCR ANION GAP     11/14/17 21:26:00 11/14/17 21:42:00 3.5 1.3 23.3 14.0 (H)                      Lipase (Final result) Component (Lab Inquiry)       Collection Time Result Time Lipase     11/14/17 21:26:00 11/14/17 21:42:00 43                      Light Blue Top (Final result) Component (Lab Inquiry)       Collection Time Result Time Extra Tube     11/14/17 21:26:00 11/14/17 22:31:00 hold for add-on  Auto resulted                      Green Top (Gel) (Final result) Component (Lab Inquiry)       Collection Time Result Time Extra Tube     11/14/17 21:26:00 11/14/17 22:31:00 Hold for add-ons.  Auto resulted.                      Lavender Top (Final result) Component (Lab Inquiry)       Collection Time Result Time Extra Tube     11/14/17 21:26:00 11/14/17 22:31:00 hold for add-on  Auto resulted                      Red Top (Final result) Component (Lab Inquiry)       Collection Time Result Time Extra Tube     11/14/17 21:26:00 11/14/17 22:31:00 Hold for add-ons.  Auto resulted.                      CBC Auto Differential (Final result)    Abnormal Component (Lab Inquiry)       Collection Time Result Time WBC ADJ RBC HGB HCT MCV     11/14/17 21:26:00 11/14/17 21:32:00 26.02 (H) 4.65 13.6 40.8 87.7          Collection Time Result Time MCH MCHC RDW RDWSD MPV     11/14/17 21:26:00 11/14/17 21:32:00 29.2 33.3 14.0  44.2 10.1          Collection Time Result Time PLT NEUTRO PCT LYMPHO PCT MONO PCT EOS PCT     11/14/17 21:26:00 11/14/17 21:32:00 395 89.9 (H) 4.3 (L) 5.0 0.2          Collection Time Result Time BASOS PCT AUTO IG% NEUTRO ABS LYMPHS ABS MONOS ABS     11/14/17 21:26:00 11/14/17 21:32:00 0.1 0.5 23.38 (H) 1.12 1.31 (H)          Collection Time Result Time EOS ABS BASOS ABS AUTO IG#     11/14/17 21:26:00 11/14/17 21:32:00 0.05 0.03 0.13 (H)                      Hepatitis Panel, Acute (Final result) Component (Lab Inquiry)       Collection Time Result Time HCV S/C RO HEP C AB HEP A IGM Hep B Core HEP B S AG     11/14/17 21:26:00 11/15/17 16:57:00 0.07 Negative Negative Negative Negative                      SCANNED - LABS (Final result) Result time: 11/19/17 05:51:04       Imaging Results           XR Abdomen 2 View With Chest 1 View (Final result) Result time: 11/16/17 07:56:09     Final result by Kuldip Dent MD (11/16/17 07:56:09)     Impression:     1.  Chest x-ray demonstrates no active disease.  2.  The bowel gas pattern is near normal. There is one mildly prominent  small bowel loop in the left upper quadrant measuring 3.4 cm. The gas  pattern appears to be improved compared to the previous CT study.  3. The NG tube needs to be advanced about 5 cm to get the tip and  side-port in the stomach. The side-port is now near the gastroesophageal  junction.        This report was finalized on 11/16/2017 07:56 by Dr. Kuldip Dent MD.     Narrative:     EXAMINATION:  XR ABDOMEN 2 VW W CHEST 1 VW-  11/16/2017 4:00 AM CST     HISTORY: Small bowel obstruction. K50.019-Crohn's disease of small  intestine with unspecified complications.     COMPARISON: No comparison study.     CHEST X-RAY: The lungs are expanded and clear. Pleural spaces are clear.  The heart is normal in size.     SUPINE AND UPRIGHT ABDOMEN: There is an NG tube in place. The side-port  is near the gastroesophageal junction. The tube needs to be  advanced  about 5 cm. The bowel gas pattern is near normal. There is one mildly  dilated small bowel loop in the left upper quadrant measuring 3.4 cm.  The gas pattern appears to be improved compared to the CT on 11/15/2017.  There is no organomegaly. Tubal ligation clips are noted.                  CT Abdomen Pelvis With Contrast (Final result) Result time: 11/15/17 07:34:49     Final result by Kuldip Dent MD (11/15/17 07:34:49)     Impression:     1. There is wall thickening of a fairly long segment of distal ileum  extending all the way to the ileocecal valve. Small bowel is dilated  proximal to the area of thickening and measures up to 4.6 cm in  diameter. There is gastric distention with fluid. There are air-fluid  levels throughout the dilated small bowel. There is decompression of the  colon from the splenic flexure distally. These findings are consistent  with inflammation of the distal ileum. This would be a common finding in  patients with Crohn's disease. Ulcerative colitis with backwash ileitis  is possible. Yersinia infection and other infectious or inflammatory  diseases are also in the differential diagnosis.  2. Small amount of free fluid likely related to the above findings.  3. Fatty infiltration of the liver.        This report was finalized on 11/15/2017 07:34 by Dr. Kuldip Dent MD.     Narrative:     EXAMINATION:  CT ABDOMEN PELVIS W CONTRAST-  11/14/2017 2:09 AM CST     HISTORY: Abdominal pain with nausea and vomiting. White blood cell count  of 26,020. There is a history of ulcerative colitis and anal fistula  repair.     TECHNIQUE: Spiral CT was performed of the abdomen and pelvis with  contrast. Multiplanar images were reconstructed.     DLP: 277 mGy-cm. Automated dosage control was utilized.     COMPARISON: No comparison study.      LUNG BASES: Minimal dependent atelectasis.     LIVER AND SPLEEN: There is fatty infiltration of the liver. Mild  distention of the gallbladder measuring  4.4 cm in short axis diameter.  Unremarkable spleen.     PANCREAS: No pancreatic mass or inflammatory change.     KIDNEYS AND ADRENALS: The kidneys and adrenal glands are unremarkable.  No bladder abnormality is detected.     BOWEL: The stomach and small bowel are distended with fluid with  multiple air-fluid levels. There is a moderately long segment of  abnormal distal ileum with wall thickening and stranding of the fat  around the distal small bowel. This extends to the ileocecal valve.  Small bowel measures up to 4.6 cm in diameter. The colon is decompressed  from the splenic flexure distally. The appendix is normal.     OTHER: There is a small amount of free fluid in the pelvis. There has  been prior tubal ligation. The uterus and ovaries demonstrate a normal  CT appearance. 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                  SCANNED - IMAGING (Final result) Result time: 11/19/17 04:31:41       ECG Results      None                     MDM  Number of Diagnoses or Management Options  Intestinal obstruction, unspecified cause, unspecified whether partial or complete: new and requires workup     Amount and/or Complexity of Data Reviewed  Clinical lab tests: reviewed and ordered  Tests in the radiology section of CPT®: ordered and reviewed  Discussion of test results with the performing providers: yes  Decide to obtain previous medical records or to obtain history from someone other than the patient: yes  Obtain history from someone other than the patient: yes  Review and summarize past medical records: yes  Discuss the patient with other providers: yes  Independent visualization of images, tracings, or specimens: yes    Risk of Complications, Morbidity, and/or Mortality  Presenting problems: moderate  Diagnostic procedures: moderate  Management options: moderate    Critical Care  Total time providing critical care:  minutes    Patient Progress  Patient progress: other  (comment) (Patient is to department  [facility].)      Final diagnoses:   Intestinal obstruction, unspecified cause, unspecified whether partial or complete            Ben KAYE MD  12/11/17 0622

## 2017-11-15 NOTE — CONSULTS
[facility]  Initial Inpatient Consult Note    Referring Provider: Damaris Billy MD    Reason for Consultation: abnormal ct , bowel obstruction,crohns     Subjective     History of present illness:         32-year-old female admitted with bowel obstruction and dehydration.  She has history of Crohn's disease followed by Dr. Solis.  Last office visit with Dr. Solis was 2016.  Next appointment is 2018.  She had a colonoscopy report  2016 that showed aphtha in the terminal ileum and recall was recommended 2 years.  She was diagnosed with Crohn's disease  after perirectal abscess.  She has been on Pentasa since then.  Takes Pentasa 500 mg 2 pills 3 times daily.  She has never taken steroids.  No history of bowel surgery.   Her problems started yesterday with abdominal pain as well as nausea vomiting abdominal bloating.  Typically she moves her bowels daily.  No diarrhea recently.  Had been having 2 bowel movements daily.  She has not had any rectal bleeding.  No fevers or chills.  No unintentional weight loss.  No skin rash.  No joint pain.      CT scan abdomen and pelvis with contrast notes wall thickening of the distal ileum extending all the way to the ileocecal valve, small bowel dilated ox multiple area of thickening and measures up to 4.6 cm in diameter, gastric distention with fluid noted.. Platelets of count on admission 26, hemoglobin 13.6.      She has history of small bowel obstruction treated conservatively at [facility], records under media.  This was 2017.  Prior to that she is relatively had not no significant trouble with her Crohn's.  She was diagnosed with a perirectal abscess and fistula but is having no significant trouble since then.            Past Medical History:  History reviewed. No pertinent past medical history.    Past Surgical History:  Past Surgical History:   Procedure Laterality Date   • ANAL FISTULA REPAIR     •   SECTION     • DILATATION AND CURETTAGE          Social History:   Social History   Substance Use Topics   • Smoking status: Never Smoker   • Smokeless tobacco: Not on file   • Alcohol use No        Family History:  History reviewed. No pertinent family history.    Home Meds:  Prescriptions Prior to Admission   Medication Sig Dispense Refill Last Dose   • mesalamine (PENTASA) 500 MG CR capsule Take 2 capsules by mouth 3 (Three) Times a Day. 180 capsule 5        Current Meds:  Current Facility-Administered Medications   Medication Dose Route Frequency Provider Last Rate Last Dose   • ampicillin-sulbactam 3 g/100 mL 0.9% NS (MBP)  3 g Intravenous Q6H Damaris Billy MD   Stopped at 11/15/17 0937   • Morphine sulfate (PF) injection 2 mg  2 mg Intravenous Q2H PRN Ben KAYE MD   2 mg at 11/15/17 0751   • ondansetron (ZOFRAN) injection 4 mg  4 mg Intravenous Q4H PRN Ben KAYE MD       • promethazine (PHENERGAN) injection 12.5 mg  12.5 mg Intravenous Once Ben KAYE MD       • sodium chloride 0.9 % bolus 1,000 mL  1,000 mL Intravenous Once Ben KAYE MD   1,000 mL at 11/15/17 1040   • sodium chloride 0.9 % flush 10 mL  10 mL Intravenous PRN Ben KAYE MD       • sodium chloride 0.9 % infusion  125 mL/hr Intravenous Continuous Ben KAYE  mL/hr at 11/15/17 0544 125 mL/hr at 11/15/17 0544       Allergies:  Allergies   Allergen Reactions   • Guaifenesin Er Hives       Review of Systems   Review of Systems   Constitutional: Negative for appetite change, chills, fatigue, fever and unexpected weight change.   HENT: Negative for sore throat and trouble swallowing.    Respiratory: Negative for cough, chest tightness, shortness of breath and wheezing.    Cardiovascular: Negative for chest pain and palpitations.   Gastrointestinal: Positive for abdominal distention, abdominal pain, nausea and vomiting. Negative for anal bleeding, blood in stool, constipation, diarrhea and rectal pain.        As  mentioned in hpi   Genitourinary: Negative for difficulty urinating and hematuria.   Musculoskeletal: Negative for arthralgias and back pain.   Skin: Negative for color change and rash.   Neurological: Negative for dizziness, syncope, light-headedness and headaches.   Psychiatric/Behavioral: Negative for confusion. The patient is not nervous/anxious.         Objective     Vital Signs  Temp:  [97.9 °F (36.6 °C)-99.1 °F (37.3 °C)] 99.1 °F (37.3 °C)  Heart Rate:  [] 103  Resp:  [12-20] 16  BP: ()/(54-85) 98/65    Physical Exam:   Physical Exam   Constitutional: She appears well-developed and well-nourished. No distress.   HENT:   Head: Normocephalic and atraumatic.   Eyes: EOM are normal. No scleral icterus.   Neck: Neck supple. No JVD present.   Cardiovascular: Normal rate, regular rhythm and normal heart sounds.    Pulmonary/Chest: Effort normal and breath sounds normal. No stridor.   Abdominal: Soft. Bowel sounds are normal. She exhibits no distension and no mass. There is tenderness (Mild tenderness right lower quadrant). There is no rebound and no guarding.   Abdomen is nondistended and bowel sounds hypoactive   Musculoskeletal: She exhibits no edema.   Neurological: She is alert.   Skin: Skin is warm and dry. No rash noted.   Psychiatric: She has a normal mood and affect. Her behavior is normal.   Vitals reviewed.      Results Review:   I reviewed the patient's new clinical results.  I reviewed the patient's new imaging results and agree with the interpretation.  I reviewed the patient's other test results and agree with the interpretation    Lab Results (most recent)     Procedure Component Value Units Date/Time    CBC & Differential [[id number]] Collected:  11/14/17 2126    Specimen:  Blood Updated:  11/14/17 2132    Narrative:       The following orders were created for panel order CBC & Differential.  Procedure                               Abnormality         Status                     ---------                                -----------         ------                     CBC Auto Differential[[id number]]         Abnormal            Final result                 Please view results for these tests on the individual orders.    CBC Auto Differential [[id number]]  (Abnormal) Collected:  11/14/17 2126    Specimen:  Blood Updated:  11/14/17 2132     WBC 26.02 (H) 10*3/mm3      RBC 4.65 10*6/mm3      Hemoglobin 13.6 g/dL      Hematocrit 40.8 %      MCV 87.7 fL      MCH 29.2 pg      MCHC 33.3 g/dL      RDW 14.0 %      RDW-SD 44.2 fl      MPV 10.1 fL      Platelets 395 10*3/mm3      Neutrophil % 89.9 (H) %      Lymphocyte % 4.3 (L) %      Monocyte % 5.0 %      Eosinophil % 0.2 %      Basophil % 0.1 %      Immature Grans % 0.5 %      Neutrophils, Absolute 23.38 (H) 10*3/mm3      Lymphocytes, Absolute 1.12 10*3/mm3      Monocytes, Absolute 1.31 (H) 10*3/mm3      Eosinophils, Absolute 0.05 10*3/mm3      Basophils, Absolute 0.03 10*3/mm3      Immature Grans, Absolute 0.13 (H) 10*3/mm3     Comprehensive Metabolic Panel [[id number]]  (Abnormal) Collected:  11/14/17 2126    Specimen:  Blood Updated:  11/14/17 2142     Glucose 112 (H) mg/dL      BUN 17 mg/dL      Creatinine 0.73 mg/dL      Sodium 141 mmol/L      Potassium 4.1 mmol/L      Chloride 102 mmol/L      CO2 25.0 mmol/L      Calcium 10.1 mg/dL      Total Protein 8.2 g/dL      Albumin 4.70 g/dL      ALT (SGPT) 37 U/L      AST (SGOT) 34 U/L      Alkaline Phosphatase 108 U/L      Total Bilirubin 0.4 mg/dL      eGFR Non African Amer 91 mL/min/1.73      Globulin 3.5 gm/dL      A/G Ratio 1.3 g/dL      BUN/Creatinine Ratio 23.3     Anion Gap 14.0 (H) mmol/L     Lipase [[id number]]  (Normal) Collected:  11/14/17 2126    Specimen:  Blood Updated:  11/14/17 2142     Lipase 43 U/L     Winter Draw [[id number]] Collected:  11/14/17 2126    Specimen:  Blood Updated:  11/14/17 2238    Narrative:       The following orders were created for panel order Winter Draw.  Procedure                                Abnormality         Status                     ---------                               -----------         ------                     Light Blue Top[[id number]]                                    Final result               Green Top (Gel)[[id number]]                                   Final result               Lavender Top[[id number]]                                      Final result               Red Top[[id number]]                                           Final result                 Please view results for these tests on the individual orders.    Light Blue Top [[id number]] Collected:  11/14/17 2126    Specimen:  Blood Updated:  11/14/17 2231     Extra Tube hold for add-on      Auto resulted       Green Top (Gel) [[id number]] Collected:  11/14/17 2126    Specimen:  Blood Updated:  11/14/17 2231     Extra Tube Hold for add-ons.      Auto resulted.       Lavender Top [[id number]] Collected:  11/14/17 2126    Specimen:  Blood Updated:  11/14/17 2231     Extra Tube hold for add-on      Auto resulted       Red Top [[id number]] Collected:  11/14/17 2126    Specimen:  Blood Updated:  11/14/17 2231     Extra Tube Hold for add-ons.      Auto resulted.       Pregnancy, Urine - Urine, Clean Catch [[id number]]  (Normal) Collected:  11/14/17 2340    Specimen:  Urine from Urine, Clean Catch Updated:  11/15/17 0428     HCG, Urine QL Negative    Urine Culture - Urine, Urine, Clean Catch [[id number]] Collected:  11/14/17 2340    Specimen:  Urine from Urine, Clean Catch Updated:  11/15/17 0557    Urinalysis With / Culture If Indicated - Urine, Clean Catch [[id number]]  (Abnormal) Collected:  11/14/17 2340    Specimen:  Urine from Urine, Clean Catch Updated:  11/15/17 0559     Color, UA Dark Yellow (A)     Appearance, UA Cloudy (A)     pH, UA 5.5     Specific Gravity, UA >1.030 (H)     Glucose, UA Negative     Ketones, UA Trace (A)     Bilirubin, UA Small (1+) (A)     Blood, UA Negative     Protein, UA Trace (A)     Leuk Esterase, UA Trace  (A)     Nitrite, UA Negative     Urobilinogen, UA 1.0 E.U./dL    Urinalysis, Microscopic Only - Urine, Clean Catch [[id number]]  (Abnormal) Collected:  11/14/17 2340    Specimen:  Urine from Urine, Clean Catch Updated:  11/15/17 0559     RBC, UA 3-5 (A) /HPF      WBC, UA 3-5 (A) /HPF      Bacteria, UA 1+ (A) /HPF      Squamous Epithelial Cells, UA 7-12 (A) /HPF      Mucus, UA Large/3+ (A) /HPF      Methodology Automated Microscopy          Radiology:  Imaging Results (last 24 hours)     Procedure Component Value Units Date/Time    CT Abdomen Pelvis With Contrast [[id number]] Collected:  11/15/17 0725     Updated:  11/15/17 0737    Narrative:       EXAMINATION:  CT ABDOMEN PELVIS W CONTRAST-  11/14/2017 2:09 AM CST     HISTORY: Abdominal pain with nausea and vomiting. White blood cell count  of 26,020. There is a history of ulcerative colitis and anal fistula  repair.     TECHNIQUE: Spiral CT was performed of the abdomen and pelvis with  contrast. Multiplanar images were reconstructed.     DLP: 277 mGy-cm. Automated dosage control was utilized.     COMPARISON: No comparison study.      LUNG BASES: Minimal dependent atelectasis.     LIVER AND SPLEEN: There is fatty infiltration of the liver. Mild  distention of the gallbladder measuring 4.4 cm in short axis diameter.  Unremarkable spleen.     PANCREAS: No pancreatic mass or inflammatory change.     KIDNEYS AND ADRENALS: The kidneys and adrenal glands are unremarkable.  No bladder abnormality is detected.     BOWEL: The stomach and small bowel are distended with fluid with  multiple air-fluid levels. There is a moderately long segment of  abnormal distal ileum with wall thickening and stranding of the fat  around the distal small bowel. This extends to the ileocecal valve.  Small bowel measures up to 4.6 cm in diameter. The colon is decompressed  from the splenic flexure distally. The appendix is normal.     OTHER: There is a small amount of free fluid in the pelvis.  There has  been prior tubal ligation. The uterus and ovaries demonstrate a normal  CT appearance. 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       Impression:       1. There is wall thickening of a fairly long segment of distal ileum  extending all the way to the ileocecal valve. Small bowel is dilated  proximal to the area of thickening and measures up to 4.6 cm in  diameter. There is gastric distention with fluid. There are air-fluid  levels throughout the dilated small bowel. There is decompression of the  colon from the splenic flexure distally. These findings are consistent  with inflammation of the distal ileum. This would be a common finding in  patients with Crohn's disease. Ulcerative colitis with backwash ileitis  is possible. Yersinia infection and other infectious or inflammatory  diseases are also in the differential diagnosis.  2. Small amount of free fluid likely related to the above findings.  3. Fatty infiltration of the liver.        This report was finalized on 11/15/2017 07:34 by Dr. Kuldip Dent MD.            Assessment/Plan     Active Problems:    Bowel obstruction    Crohn's disease of small intestine    I suspect she has distal small bowel obstruction secondary to active Crohn's disease.  There is possibility this could be fibrotic disease from her Crohn's.  I would recommend treatment with IV steroids to see if we can get this to open up.  If we are successful with this round, and we can consider tapering prednisone as an outpatient treatment plan is developed.    I did discuss with her at length about Crohn's disease and different treatment options such as immunosuppressive agents, biologic's, 5-ASA products, steroids and surgery.  We went through pluses and minuses of each category.  As she may need a more aggressive medical approach with either an immunosuppressive biologic we will go ahead and order a TB test and hepatitis panel.        I discussed the  patients findings and my recommendations with patient      EMR Dragon/transcription disclaimer:  Much of this encounter note is electronic transcription/translation of spoken language to printed text.  The electronic translation of spoken language may be erroneous, or at times, nonsensical words or phrases may be inadvertently transcribed.  Although I have reviewed the note for such errors, some may still exist.    Karla POLANCO 10:38 AM    COLLIN Francis  11/15/17  10:43 AM

## 2017-11-16 ENCOUNTER — APPOINTMENT (OUTPATIENT)
Dept: GENERAL RADIOLOGY | Facility: HOSPITAL | Age: 32
End: 2017-11-16

## 2017-11-16 LAB
ANION GAP SERPL CALCULATED.3IONS-SCNC: 9 MMOL/L (ref 4–13)
BASOPHILS # BLD AUTO: 0 10*3/MM3 (ref 0–0.2)
BASOPHILS NFR BLD AUTO: 0 % (ref 0–2)
BUN BLD-MCNC: 10 MG/DL (ref 5–21)
BUN/CREAT SERPL: 14.9 (ref 7–25)
CALCIUM SPEC-SCNC: 8.1 MG/DL (ref 8.4–10.4)
CHLORIDE SERPL-SCNC: 105 MMOL/L (ref 98–110)
CO2 SERPL-SCNC: 29 MMOL/L (ref 24–31)
CREAT BLD-MCNC: 0.67 MG/DL (ref 0.5–1.4)
CRP SERPL-MCNC: 7.7 MG/DL (ref 0–0.99)
DEPRECATED RDW RBC AUTO: 47.6 FL (ref 40–54)
EOSINOPHIL # BLD AUTO: 0 10*3/MM3 (ref 0–0.7)
EOSINOPHIL NFR BLD AUTO: 0 % (ref 0–4)
ERYTHROCYTE [DISTWIDTH] IN BLOOD BY AUTOMATED COUNT: 14.4 % (ref 12–15)
GFR SERPL CREATININE-BSD FRML MDRD: 101 ML/MIN/1.73
GLUCOSE BLD-MCNC: 98 MG/DL (ref 70–100)
HCT VFR BLD AUTO: 34.3 % (ref 37–47)
HGB BLD-MCNC: 10.8 G/DL (ref 12–16)
IMM GRANULOCYTES # BLD: 0.01 10*3/MM3 (ref 0–0.03)
IMM GRANULOCYTES NFR BLD: 0.2 % (ref 0–5)
LYMPHOCYTES # BLD AUTO: 0.85 10*3/MM3 (ref 0.72–4.86)
LYMPHOCYTES NFR BLD AUTO: 13.6 % (ref 15–45)
MCH RBC QN AUTO: 28.6 PG (ref 28–32)
MCHC RBC AUTO-ENTMCNC: 31.5 G/DL (ref 33–36)
MCV RBC AUTO: 90.7 FL (ref 82–98)
MONOCYTES # BLD AUTO: 0.65 10*3/MM3 (ref 0.19–1.3)
MONOCYTES NFR BLD AUTO: 10.4 % (ref 4–12)
NEUTROPHILS # BLD AUTO: 4.74 10*3/MM3 (ref 1.87–8.4)
NEUTROPHILS NFR BLD AUTO: 75.8 % (ref 39–78)
PLATELET # BLD AUTO: 329 10*3/MM3 (ref 130–400)
PMV BLD AUTO: 9.9 FL (ref 6–12)
POTASSIUM BLD-SCNC: 4.1 MMOL/L (ref 3.5–5.3)
RBC # BLD AUTO: 3.78 10*6/MM3 (ref 4.2–5.4)
SODIUM BLD-SCNC: 143 MMOL/L (ref 135–145)
WBC NRBC COR # BLD: 6.25 10*3/MM3 (ref 4.8–10.8)

## 2017-11-16 PROCEDURE — 25010000003 AMPICILLIN-SULBACTAM PER 1.5 G: Performed by: SPECIALIST

## 2017-11-16 PROCEDURE — 86140 C-REACTIVE PROTEIN: CPT | Performed by: NURSE PRACTITIONER

## 2017-11-16 PROCEDURE — 85025 COMPLETE CBC W/AUTO DIFF WBC: CPT | Performed by: SPECIALIST

## 2017-11-16 PROCEDURE — 25010000002 METHYLPREDNISOLONE PER 125 MG: Performed by: NURSE PRACTITIONER

## 2017-11-16 PROCEDURE — 99232 SBSQ HOSP IP/OBS MODERATE 35: CPT | Performed by: INTERNAL MEDICINE

## 2017-11-16 PROCEDURE — 25010000002 MORPHINE SULFATE (PF) 2 MG/ML SOLUTION: Performed by: EMERGENCY MEDICINE

## 2017-11-16 PROCEDURE — 86481 TB AG RESPONSE T-CELL SUSP: CPT | Performed by: INTERNAL MEDICINE

## 2017-11-16 PROCEDURE — 80048 BASIC METABOLIC PNL TOTAL CA: CPT | Performed by: SPECIALIST

## 2017-11-16 PROCEDURE — 74022 RADEX COMPL AQT ABD SERIES: CPT

## 2017-11-16 RX ADMIN — AMPICILLIN SODIUM AND SULBACTAM SODIUM 3 G: 2; 1 INJECTION, POWDER, FOR SOLUTION INTRAMUSCULAR; INTRAVENOUS at 08:44

## 2017-11-16 RX ADMIN — AMPICILLIN SODIUM AND SULBACTAM SODIUM 3 G: 2; 1 INJECTION, POWDER, FOR SOLUTION INTRAMUSCULAR; INTRAVENOUS at 14:07

## 2017-11-16 RX ADMIN — SODIUM CHLORIDE 125 ML/HR: 9 INJECTION, SOLUTION INTRAVENOUS at 12:32

## 2017-11-16 RX ADMIN — MORPHINE SULFATE 2 MG: 2 INJECTION, SOLUTION INTRAMUSCULAR; INTRAVENOUS at 01:26

## 2017-11-16 RX ADMIN — AMPICILLIN SODIUM AND SULBACTAM SODIUM 3 G: 2; 1 INJECTION, POWDER, FOR SOLUTION INTRAMUSCULAR; INTRAVENOUS at 21:05

## 2017-11-16 RX ADMIN — METHYLPREDNISOLONE SODIUM SUCCINATE 60 MG: 125 INJECTION, POWDER, FOR SOLUTION INTRAMUSCULAR; INTRAVENOUS at 12:36

## 2017-11-16 RX ADMIN — METHYLPREDNISOLONE SODIUM SUCCINATE 60 MG: 125 INJECTION, POWDER, FOR SOLUTION INTRAMUSCULAR; INTRAVENOUS at 21:06

## 2017-11-16 RX ADMIN — METHYLPREDNISOLONE SODIUM SUCCINATE 60 MG: 125 INJECTION, POWDER, FOR SOLUTION INTRAMUSCULAR; INTRAVENOUS at 05:44

## 2017-11-16 RX ADMIN — AMPICILLIN SODIUM AND SULBACTAM SODIUM 3 G: 2; 1 INJECTION, POWDER, FOR SOLUTION INTRAMUSCULAR; INTRAVENOUS at 01:29

## 2017-11-16 NOTE — PLAN OF CARE
Problem: Patient Care Overview (Adult)  Goal: Plan of Care Review  Outcome: Ongoing (interventions implemented as appropriate)    11/15/17 1506 11/15/17 2047 11/16/17 0238   Coping/Psychosocial Response Interventions   Plan Of Care Reviewed With --  patient --    Patient Care Overview   Progress no change --  --    Outcome Evaluation   Outcome Summary/Follow up Plan --  --  NG cont to LIWS       11/15/17 1506 11/15/17 2047 11/16/17 0238   Coping/Psychosocial Response Interventions   Plan Of Care Reviewed With --  patient --    Patient Care Overview   Progress no change --  --    Outcome Evaluation   Outcome Summary/Follow up Plan --  --  NG cont to LIWS, med with prn pain meds x1 as of 0300, pt resting at this time - wll cont to monitor.    , med with prn pain meds x1 as of 0300, pt resting at this time - wll cont to monitor.        Goal: Discharge Needs Assessment  Outcome: Ongoing (interventions implemented as appropriate)    Problem: Fluid Volume Deficit (Adult)  Goal: Comfort/Well Being  Outcome: Ongoing (interventions implemented as appropriate)

## 2017-11-16 NOTE — PLAN OF CARE
Problem: Patient Care Overview (Adult)  Goal: Plan of Care Review  Outcome: Ongoing (interventions implemented as appropriate)    11/16/17 1721   Coping/Psychosocial Response Interventions   Plan Of Care Reviewed With patient   Patient Care Overview   Progress improving   Outcome Evaluation   Outcome Summary/Follow up Plan NG discontinued. Tolerating clear liquids. No c/o pain. Possible d/c tomorrow. IV abx and steroids continue.       Goal: Adult Individualization and Mutuality  Outcome: Ongoing (interventions implemented as appropriate)    11/16/17 1721   Individualization   Patient Specific Preferences none   Patient Specific Goals go home   Patient Specific Interventions NG discontinued   Mutuality/Individual Preferences   What Anxieties, Fears or Concerns Do You Have About Your Health or Care? none   What Questions Do You Have About Your Health or Care? none   What Information Would Help Us Give You More Personalized Care? none       Goal: Discharge Needs Assessment  Outcome: Ongoing (interventions implemented as appropriate)    11/15/17 1506   Discharge Needs Assessment   Concerns To Be Addressed no discharge needs identified   Readmission Within The Last 30 Days no previous admission in last 30 days   Equipment Needed After Discharge none   Discharge Disposition still a patient   Current Health   Anticipated Changes Related to Illness none   Self-Care   Equipment Currently Used at Home none   Living Environment   Transportation Available family or friend will provide;car         Problem: Bowel Obstruction (Adult)  Goal: Signs and Symptoms of Listed Potential Problems Will be Absent or Manageable (Bowel Obstruction)  Outcome: Ongoing (interventions implemented as appropriate)    11/16/17 1721   Bowel Obstruction   Problems Assessed (Bowel Obstruction) all   Problems Present (Bowel Obstruction) none         Problem: Fluid Volume Deficit (Adult)  Goal: Fluid/Electrolyte Balance  Outcome: Ongoing (interventions  implemented as appropriate)    11/16/17 1721   Fluid Volume Deficit (Adult)   Fluid/Electrolyte Balance making progress toward outcome       Goal: Comfort/Well Being  Outcome: Ongoing (interventions implemented as appropriate)    11/16/17 1721   Fluid Volume Deficit (Adult)   Comfort/Well Being making progress toward outcome

## 2017-11-16 NOTE — PROGRESS NOTES
[facility]  Initial Inpatient Consult Note    Referring Provider: Damaris Billy MD    Reason for Consultation: abnormal ct , bowel obstruction,crohns     Subjective     History of present illness:      No bm , no abdominal pain, no n/v, no fever,  Ng out , sips of water, abdominal xray this am shows improvement.     Past Medical History:  Past Medical History:   Diagnosis Date   • Crohn's disease        Past Surgical History:  Past Surgical History:   Procedure Laterality Date   • ANAL FISTULA REPAIR     •  SECTION     • DILATATION AND CURETTAGE          Social History:   Social History   Substance Use Topics   • Smoking status: Never Smoker   • Smokeless tobacco: Not on file   • Alcohol use No        Family History:  History reviewed. No pertinent family history.    Home Meds:  Prescriptions Prior to Admission   Medication Sig Dispense Refill Last Dose   • mesalamine (PENTASA) 500 MG CR capsule Take 2 capsules by mouth 3 (Three) Times a Day. 180 capsule 5        Current Meds:  Current Facility-Administered Medications   Medication Dose Route Frequency Provider Last Rate Last Dose   • ampicillin-sulbactam 3 g/100 mL 0.9% NS (MBP)  3 g Intravenous Q6H Damaris Billy MD 0 mL/hr at 17 0021 3 g at 17 0844   • benzocaine-menthol (CHLORASEPTIC) lozenge 1 lozenge  1 lozenge Mouth/Throat Q2H PRN Damaris Billy MD       • methylPREDNISolone sodium succinate (SOLU-Medrol) injection 60 mg  60 mg Intravenous Q8H COLLIN Villegas   60 mg at 17 1236   • Morphine sulfate (PF) injection 2 mg  2 mg Intravenous Q2H PRN Ben KAYE MD   2 mg at 17 0126   • ondansetron (ZOFRAN) injection 4 mg  4 mg Intravenous Q4H PRN Ben KAYE MD       • phenol (CHLORASEPTIC) 1.4 % liquid 2 spray  2 spray Mouth/Throat Q2H PRN Damaris Billy MD   2 spray at 11/15/17 1216   • promethazine (PHENERGAN) injection 12.5 mg  12.5 mg Intravenous Once Ben KAYE MD       • sodium chloride  0.9 % bolus 1,000 mL  1,000 mL Intravenous Once Ben KAYE MD   1,000 mL at 11/15/17 1040   • sodium chloride 0.9 % flush 10 mL  10 mL Intravenous PRN Ben KAYE MD       • sodium chloride 0.9 % infusion  125 mL/hr Intravenous Continuous Ben KAYE  mL/hr at 11/16/17 1232 125 mL/hr at 11/16/17 1232       Allergies:  Allergies   Allergen Reactions   • Guaifenesin Er Hives       Review of Systems   Review of Systems   Constitutional: Negative for chills and fever.   Respiratory: Negative for cough, shortness of breath and wheezing.    Cardiovascular: Negative for chest pain and palpitations.   Gastrointestinal: Negative for abdominal distention, abdominal pain, anal bleeding, blood in stool, constipation, diarrhea, nausea, rectal pain and vomiting.        Objective     Vital Signs  Temp:  [97.6 °F (36.4 °C)-98.6 °F (37 °C)] 98 °F (36.7 °C)  Heart Rate:  [73-92] 92  Resp:  [16] 16  BP: ()/(48-63) 103/60    Physical Exam:   Physical Exam   Constitutional: She is oriented to person, place, and time. She appears well-developed and well-nourished. No distress.   Cardiovascular: Normal rate, regular rhythm and normal heart sounds.    Pulmonary/Chest: Effort normal and breath sounds normal.   Abdominal: Soft. Bowel sounds are normal. She exhibits no distension and no mass. There is no tenderness. There is no rebound and no guarding.   Musculoskeletal: She exhibits no edema.   Neurological: She is alert and oriented to person, place, and time.   Skin: Skin is warm and dry.   Psychiatric: She has a normal mood and affect. Her behavior is normal.   Vitals reviewed.      Results Review:   I reviewed the patient's new clinical results.  I reviewed the patient's new imaging results and agree with the interpretation.  I reviewed the patient's other test results and agree with the interpretation    Lab Results (most recent)     Procedure Component Value Units Date/Time    CBC & Differential [[id number]]  Collected:  11/14/17 2126    Specimen:  Blood Updated:  11/14/17 2132    Narrative:       The following orders were created for panel order CBC & Differential.  Procedure                               Abnormality         Status                     ---------                               -----------         ------                     CBC Auto Differential[[id number]]         Abnormal            Final result                 Please view results for these tests on the individual orders.    CBC Auto Differential [[id number]]  (Abnormal) Collected:  11/14/17 2126    Specimen:  Blood Updated:  11/14/17 2132     WBC 26.02 (H) 10*3/mm3      RBC 4.65 10*6/mm3      Hemoglobin 13.6 g/dL      Hematocrit 40.8 %      MCV 87.7 fL      MCH 29.2 pg      MCHC 33.3 g/dL      RDW 14.0 %      RDW-SD 44.2 fl      MPV 10.1 fL      Platelets 395 10*3/mm3      Neutrophil % 89.9 (H) %      Lymphocyte % 4.3 (L) %      Monocyte % 5.0 %      Eosinophil % 0.2 %      Basophil % 0.1 %      Immature Grans % 0.5 %      Neutrophils, Absolute 23.38 (H) 10*3/mm3      Lymphocytes, Absolute 1.12 10*3/mm3      Monocytes, Absolute 1.31 (H) 10*3/mm3      Eosinophils, Absolute 0.05 10*3/mm3      Basophils, Absolute 0.03 10*3/mm3      Immature Grans, Absolute 0.13 (H) 10*3/mm3     Comprehensive Metabolic Panel [[id number]]  (Abnormal) Collected:  11/14/17 2126    Specimen:  Blood Updated:  11/14/17 2142     Glucose 112 (H) mg/dL      BUN 17 mg/dL      Creatinine 0.73 mg/dL      Sodium 141 mmol/L      Potassium 4.1 mmol/L      Chloride 102 mmol/L      CO2 25.0 mmol/L      Calcium 10.1 mg/dL      Total Protein 8.2 g/dL      Albumin 4.70 g/dL      ALT (SGPT) 37 U/L      AST (SGOT) 34 U/L      Alkaline Phosphatase 108 U/L      Total Bilirubin 0.4 mg/dL      eGFR Non African Amer 91 mL/min/1.73      Globulin 3.5 gm/dL      A/G Ratio 1.3 g/dL      BUN/Creatinine Ratio 23.3     Anion Gap 14.0 (H) mmol/L     Lipase [[id number]]  (Normal) Collected:  11/14/17 3220    Specimen:   Blood Updated:  11/14/17 2142     Lipase 43 U/L     Lopez Draw [[id number]] Collected:  11/14/17 2126    Specimen:  Blood Updated:  11/14/17 2231    Narrative:       The following orders were created for panel order Lopez Draw.  Procedure                               Abnormality         Status                     ---------                               -----------         ------                     Light Blue Top[[id number]]                                    Final result               Green Top (Gel)[[id number]]                                   Final result               Lavender Top[[id number]]                                      Final result               Red Top[[id number]]                                           Final result                 Please view results for these tests on the individual orders.    Light Blue Top [[id number]] Collected:  11/14/17 2126    Specimen:  Blood Updated:  11/14/17 2231     Extra Tube hold for add-on      Auto resulted       Green Top (Gel) [[id number]] Collected:  11/14/17 2126    Specimen:  Blood Updated:  11/14/17 2231     Extra Tube Hold for add-ons.      Auto resulted.       Lavender Top [[id number]] Collected:  11/14/17 2126    Specimen:  Blood Updated:  11/14/17 2231     Extra Tube hold for add-on      Auto resulted       Red Top [[id number]] Collected:  11/14/17 2126    Specimen:  Blood Updated:  11/14/17 2231     Extra Tube Hold for add-ons.      Auto resulted.       Pregnancy, Urine - Urine, Clean Catch [[id number]]  (Normal) Collected:  11/14/17 2340    Specimen:  Urine from Urine, Clean Catch Updated:  11/15/17 0428     HCG, Urine QL Negative    Urinalysis With / Culture If Indicated - Urine, Clean Catch [[id number]]  (Abnormal) Collected:  11/14/17 2340    Specimen:  Urine from Urine, Clean Catch Updated:  11/15/17 0559     Color, UA Dark Yellow (A)     Appearance, UA Cloudy (A)     pH, UA 5.5     Specific Gravity, UA >1.030 (H)     Glucose, UA Negative     Ketones, UA Trace (A)      Bilirubin, UA Small (1+) (A)     Blood, UA Negative     Protein, UA Trace (A)     Leuk Esterase, UA Trace (A)     Nitrite, UA Negative     Urobilinogen, UA 1.0 E.U./dL    Urinalysis, Microscopic Only - Urine, Clean Catch [[id number]]  (Abnormal) Collected:  11/14/17 2340    Specimen:  Urine from Urine, Clean Catch Updated:  11/15/17 0559     RBC, UA 3-5 (A) /HPF      WBC, UA 3-5 (A) /HPF      Bacteria, UA 1+ (A) /HPF      Squamous Epithelial Cells, UA 7-12 (A) /HPF      Mucus, UA Large/3+ (A) /HPF      Methodology Automated Microscopy    Sedimentation Rate [[id number]]  (Normal) Collected:  11/15/17 1101    Specimen:  Blood Updated:  11/15/17 1125     Sed Rate 2 mm/hr     CBC & Differential [[id number]] Collected:  11/15/17 1101    Specimen:  Blood Updated:  11/15/17 1127    Narrative:       The following orders were created for panel order CBC & Differential.  Procedure                               Abnormality         Status                     ---------                               -----------         ------                     CBC Auto Differential[[id number]]        Abnormal            Final result                 Please view results for these tests on the individual orders.    CBC Auto Differential [[id number]]  (Abnormal) Collected:  11/15/17 1101    Specimen:  Blood Updated:  11/15/17 1127     WBC 10.96 (H) 10*3/mm3      RBC 4.17 (L) 10*6/mm3      Hemoglobin 12.0 g/dL      Hematocrit 37.6 %      MCV 90.2 fL      MCH 28.8 pg      MCHC 31.9 (L) g/dL      RDW 14.2 %      RDW-SD 46.8 fl      MPV 10.0 fL      Platelets 349 10*3/mm3      Neutrophil % 80.1 (H) %      Lymphocyte % 9.0 (L) %      Monocyte % 9.9 %      Eosinophil % 0.5 %      Basophil % 0.1 %      Immature Grans % 0.4 %      Neutrophils, Absolute 8.79 (H) 10*3/mm3      Lymphocytes, Absolute 0.99 10*3/mm3      Monocytes, Absolute 1.08 10*3/mm3      Eosinophils, Absolute 0.05 10*3/mm3      Basophils, Absolute 0.01 10*3/mm3      Immature Grans, Absolute  0.04 (H) 10*3/mm3     Hepatitis Panel, Acute [[id number]]  (Normal) Collected:  11/14/17 2126    Specimen:  Blood Updated:  11/15/17 1657     HCV S/C Ratio 0.07     Hepatitis C Ab Negative     Hep A IgM Negative     Hep B C IgM Negative     Hepatitis B Surface Ag Negative    TSPOT [[id number]] Collected:  11/16/17 0505    Specimen:  Blood Updated:  11/16/17 0518    CBC & Differential [[id number]] Collected:  11/16/17 0505    Specimen:  Blood Updated:  11/16/17 0530    Narrative:       The following orders were created for panel order CBC & Differential.  Procedure                               Abnormality         Status                     ---------                               -----------         ------                     CBC Auto Differential[[id number]]        Abnormal            Final result                 Please view results for these tests on the individual orders.    CBC Auto Differential [[id number]]  (Abnormal) Collected:  11/16/17 0505    Specimen:  Blood Updated:  11/16/17 0530     WBC 6.25 10*3/mm3      RBC 3.78 (L) 10*6/mm3      Hemoglobin 10.8 (L) g/dL      Hematocrit 34.3 (L) %      MCV 90.7 fL      MCH 28.6 pg      MCHC 31.5 (L) g/dL      RDW 14.4 %      RDW-SD 47.6 fl      MPV 9.9 fL      Platelets 329 10*3/mm3      Neutrophil % 75.8 %      Lymphocyte % 13.6 (L) %      Monocyte % 10.4 %      Eosinophil % 0.0 %      Basophil % 0.0 %      Immature Grans % 0.2 %      Neutrophils, Absolute 4.74 10*3/mm3      Lymphocytes, Absolute 0.85 10*3/mm3      Monocytes, Absolute 0.65 10*3/mm3      Eosinophils, Absolute 0.00 10*3/mm3      Basophils, Absolute 0.00 10*3/mm3      Immature Grans, Absolute 0.01 10*3/mm3     Basic Metabolic Panel [[id number]]  (Abnormal) Collected:  11/16/17 0505    Specimen:  Blood Updated:  11/16/17 0547     Glucose 98 mg/dL      BUN 10 mg/dL      Creatinine 0.67 mg/dL      Sodium 143 mmol/L      Potassium 4.1 mmol/L      Chloride 105 mmol/L      CO2 29.0 mmol/L      Calcium 8.1 (L) mg/dL       eGFR Non African Amer 101 mL/min/1.73      BUN/Creatinine Ratio 14.9     Anion Gap 9.0 mmol/L     Narrative:       GFR Normal >60  Chronic Kidney Disease <60  Kidney Failure <15    C-reactive Protein [[id number]]  (Abnormal) Collected:  11/16/17 0505    Specimen:  Blood Updated:  11/16/17 0547     C-Reactive Protein 7.70 (H) mg/dL     Urine Culture - Urine, Urine, Clean Catch [[id number]]  (Normal) Collected:  11/14/17 2340    Specimen:  Urine from Urine, Clean Catch Updated:  11/16/17 0641     Urine Culture No growth at 24 hours          Radiology:  Imaging Results (last 24 hours)     Procedure Component Value Units Date/Time    XR Abdomen 2 View With Chest 1 View [[id number]] Collected:  11/16/17 0754     Updated:  11/16/17 0759    Narrative:       EXAMINATION:  XR ABDOMEN 2 VW W CHEST 1 VW-  11/16/2017 4:00 AM CST     HISTORY: Small bowel obstruction. K50.019-Crohn's disease of small  intestine with unspecified complications.     COMPARISON: No comparison study.     CHEST X-RAY: The lungs are expanded and clear. Pleural spaces are clear.  The heart is normal in size.     SUPINE AND UPRIGHT ABDOMEN: There is an NG tube in place. The side-port  is near the gastroesophageal junction. The tube needs to be advanced  about 5 cm. The bowel gas pattern is near normal. There is one mildly  dilated small bowel loop in the left upper quadrant measuring 3.4 cm.  The gas pattern appears to be improved compared to the CT on 11/15/2017.  There is no organomegaly. Tubal ligation clips are noted.       Impression:       1.  Chest x-ray demonstrates no active disease.  2.  The bowel gas pattern is near normal. There is one mildly prominent  small bowel loop in the left upper quadrant measuring 3.4 cm. The gas  pattern appears to be improved compared to the previous CT study.  3. The NG tube needs to be advanced about 5 cm to get the tip and  side-port in the stomach. The side-port is now near the gastroesophageal  junction.         This report was finalized on 11/16/2017 07:56 by Dr. Kuldip Dent MD.            Assessment/Plan     Active Problems:    Bowel obstruction    Crohn's disease of small intestine  She has improved on IV Solu-Medrol.  NG is now out.  Think is reasonable to place her on clear liquids today and see how she does.  Possibly advance to a low roughage diet tomorrow.  May be able to switch over to oral medication as well.  Again I talked to her about different treatment options for Crohn's in the future.  She is in agreement with the current plans of steroids and advancing to clears.  I encourage ambulation.    Hepatitis panel, T Spot are pending chest x-ray showed no active disease.  Dr. Solis  to assess tomorrow and resume care for GI      I discussed the patients findings and my recommendations with patient      EMR Dragon/transcription disclaimer:  Much of this encounter note is electronic transcription/translation of spoken language to printed text.  The electronic translation of spoken language may be erroneous, or at times, nonsensical words or phrases may be inadvertently transcribed.  Although I have reviewed the note for such errors, some may still exist.    Karla Choi APRN 10:27 AM    Antonio Negro MD  11/16/17  2:34 PM

## 2017-11-16 NOTE — PROGRESS NOTES
Damaris Billy MD Progress Note     LOS: 1 day   Patient Care Team:  No Known Provider as PCP - General        Subjective     Feeling much better.  Having bowel movements.  Once NG tube out    Objective     Vital Signs  Temp:  [97.6 °F (36.4 °C)-99 °F (37.2 °C)] 97.6 °F (36.4 °C)  Heart Rate:  [] 73  Resp:  [16] 16  BP: ()/(48-63) 90/48    Intake & Output (last 3 days)       11/13 0701 - 11/14 0700 11/14 0701 - 11/15 0700 11/15 0701 - 11/16 0700 11/16 0701 - 11/17 0700    I.V. (mL/kg)   1627 (27)     Other   50     IV Piggyback  1000 200     Total Intake(mL/kg)  1000 (16.6) 1877 (31.1)     Urine (mL/kg/hr)   1675 (1.2)     Other   1350 (0.9)     Total Output     3025      Net   +1000 -1148                    Physical Exam:     General Appearance:    Alert, cooperative, in no acute distress   Lungs:     respirations regular, even and unlabored    Heart:    Regular rhythm and normal rate, normal S1 and S2, no            murmur, no gallop, no rub   Chest Wall:    No abnormalities observed   Abdomen:      Soft minimal tenderness no tympany or distention    Extremities: No edema, + SCDs   Results Review:     I reviewed the patient's new clinical results.    Lab Results (last 72 hours)     Procedure Component Value Units Date/Time    CBC & Differential [[id number]] Collected:  11/14/17 2126    Specimen:  Blood Updated:  11/14/17 2132    Narrative:       The following orders were created for panel order CBC & Differential.  Procedure                               Abnormality         Status                     ---------                               -----------         ------                     CBC Auto Differential[[id number]]         Abnormal            Final result                 Please view results for these tests on the individual orders.    CBC Auto Differential [[id number]]  (Abnormal) Collected:  11/14/17 2126    Specimen:  Blood Updated:  11/14/17 2132     WBC 26.02 (H) 10*3/mm3      RBC 4.65 10*6/mm3       Hemoglobin 13.6 g/dL      Hematocrit 40.8 %      MCV 87.7 fL      MCH 29.2 pg      MCHC 33.3 g/dL      RDW 14.0 %      RDW-SD 44.2 fl      MPV 10.1 fL      Platelets 395 10*3/mm3      Neutrophil % 89.9 (H) %      Lymphocyte % 4.3 (L) %      Monocyte % 5.0 %      Eosinophil % 0.2 %      Basophil % 0.1 %      Immature Grans % 0.5 %      Neutrophils, Absolute 23.38 (H) 10*3/mm3      Lymphocytes, Absolute 1.12 10*3/mm3      Monocytes, Absolute 1.31 (H) 10*3/mm3      Eosinophils, Absolute 0.05 10*3/mm3      Basophils, Absolute 0.03 10*3/mm3      Immature Grans, Absolute 0.13 (H) 10*3/mm3     Comprehensive Metabolic Panel [[id number]]  (Abnormal) Collected:  11/14/17 2126    Specimen:  Blood Updated:  11/14/17 2142     Glucose 112 (H) mg/dL      BUN 17 mg/dL      Creatinine 0.73 mg/dL      Sodium 141 mmol/L      Potassium 4.1 mmol/L      Chloride 102 mmol/L      CO2 25.0 mmol/L      Calcium 10.1 mg/dL      Total Protein 8.2 g/dL      Albumin 4.70 g/dL      ALT (SGPT) 37 U/L      AST (SGOT) 34 U/L      Alkaline Phosphatase 108 U/L      Total Bilirubin 0.4 mg/dL      eGFR Non African Amer 91 mL/min/1.73      Globulin 3.5 gm/dL      A/G Ratio 1.3 g/dL      BUN/Creatinine Ratio 23.3     Anion Gap 14.0 (H) mmol/L     Lipase [[id number]]  (Normal) Collected:  11/14/17 2126    Specimen:  Blood Updated:  11/14/17 2142     Lipase 43 U/L     [place] Draw [[id number]] Collected:  11/14/17 2126    Specimen:  Blood Updated:  11/14/17 2231    Narrative:       The following orders were created for panel order [place] Draw.  Procedure                               Abnormality         Status                     ---------                               -----------         ------                     Light Blue Top[[id number]]                                    Final result               Green Top (Gel)[[id number]]                                   Final result               Lavender Top[[id number]]                                      Final result                Red Top[[id number]]                                           Final result                 Please view results for these tests on the individual orders.    Light Blue Top [[id number]] Collected:  11/14/17 2126    Specimen:  Blood Updated:  11/14/17 2231     Extra Tube hold for add-on      Auto resulted       Green Top (Gel) [[id number]] Collected:  11/14/17 2126    Specimen:  Blood Updated:  11/14/17 2231     Extra Tube Hold for add-ons.      Auto resulted.       Lavender Top [[id number]] Collected:  11/14/17 2126    Specimen:  Blood Updated:  11/14/17 2231     Extra Tube hold for add-on      Auto resulted       Red Top [[id number]] Collected:  11/14/17 2126    Specimen:  Blood Updated:  11/14/17 2231     Extra Tube Hold for add-ons.      Auto resulted.       Pregnancy, Urine - Urine, Clean Catch [[id number]]  (Normal) Collected:  11/14/17 2340    Specimen:  Urine from Urine, Clean Catch Updated:  11/15/17 0428     HCG, Urine QL Negative    Urinalysis With / Culture If Indicated - Urine, Clean Catch [[id number]]  (Abnormal) Collected:  11/14/17 2340    Specimen:  Urine from Urine, Clean Catch Updated:  11/15/17 0559     Color, UA Dark Yellow (A)     Appearance, UA Cloudy (A)     pH, UA 5.5     Specific Gravity, UA >1.030 (H)     Glucose, UA Negative     Ketones, UA Trace (A)     Bilirubin, UA Small (1+) (A)     Blood, UA Negative     Protein, UA Trace (A)     Leuk Esterase, UA Trace (A)     Nitrite, UA Negative     Urobilinogen, UA 1.0 E.U./dL    Urinalysis, Microscopic Only - Urine, Clean Catch [[id number]]  (Abnormal) Collected:  11/14/17 2340    Specimen:  Urine from Urine, Clean Catch Updated:  11/15/17 0559     RBC, UA 3-5 (A) /HPF      WBC, UA 3-5 (A) /HPF      Bacteria, UA 1+ (A) /HPF      Squamous Epithelial Cells, UA 7-12 (A) /HPF      Mucus, UA Large/3+ (A) /HPF      Methodology Automated Microscopy    Sedimentation Rate [[id number]]  (Normal) Collected:  11/15/17 1101    Specimen:  Blood Updated:  11/15/17 1125      Sed Rate 2 mm/hr     CBC & Differential [[id number]] Collected:  11/15/17 1101    Specimen:  Blood Updated:  11/15/17 1127    Narrative:       The following orders were created for panel order CBC & Differential.  Procedure                               Abnormality         Status                     ---------                               -----------         ------                     CBC Auto Differential[[id number]]        Abnormal            Final result                 Please view results for these tests on the individual orders.    CBC Auto Differential [[id number]]  (Abnormal) Collected:  11/15/17 1101    Specimen:  Blood Updated:  11/15/17 1127     WBC 10.96 (H) 10*3/mm3      RBC 4.17 (L) 10*6/mm3      Hemoglobin 12.0 g/dL      Hematocrit 37.6 %      MCV 90.2 fL      MCH 28.8 pg      MCHC 31.9 (L) g/dL      RDW 14.2 %      RDW-SD 46.8 fl      MPV 10.0 fL      Platelets 349 10*3/mm3      Neutrophil % 80.1 (H) %      Lymphocyte % 9.0 (L) %      Monocyte % 9.9 %      Eosinophil % 0.5 %      Basophil % 0.1 %      Immature Grans % 0.4 %      Neutrophils, Absolute 8.79 (H) 10*3/mm3      Lymphocytes, Absolute 0.99 10*3/mm3      Monocytes, Absolute 1.08 10*3/mm3      Eosinophils, Absolute 0.05 10*3/mm3      Basophils, Absolute 0.01 10*3/mm3      Immature Grans, Absolute 0.04 (H) 10*3/mm3     Hepatitis Panel, Acute [[id number]]  (Normal) Collected:  11/14/17 2126    Specimen:  Blood Updated:  11/15/17 1657     HCV S/C Ratio 0.07     Hepatitis C Ab Negative     Hep A IgM Negative     Hep B C IgM Negative     Hepatitis B Surface Ag Negative    TSPOT [[id number]] Collected:  11/16/17 0505    Specimen:  Blood Updated:  11/16/17 0518    CBC & Differential [[id number]] Collected:  11/16/17 0505    Specimen:  Blood Updated:  11/16/17 0530    Narrative:       The following orders were created for panel order CBC & Differential.  Procedure                               Abnormality         Status                     ---------                                -----------         ------                     CBC Auto Differential[[id number]]        Abnormal            Final result                 Please view results for these tests on the individual orders.    CBC Auto Differential [[id number]]  (Abnormal) Collected:  11/16/17 0505    Specimen:  Blood Updated:  11/16/17 0530     WBC 6.25 10*3/mm3      RBC 3.78 (L) 10*6/mm3      Hemoglobin 10.8 (L) g/dL      Hematocrit 34.3 (L) %      MCV 90.7 fL      MCH 28.6 pg      MCHC 31.5 (L) g/dL      RDW 14.4 %      RDW-SD 47.6 fl      MPV 9.9 fL      Platelets 329 10*3/mm3      Neutrophil % 75.8 %      Lymphocyte % 13.6 (L) %      Monocyte % 10.4 %      Eosinophil % 0.0 %      Basophil % 0.0 %      Immature Grans % 0.2 %      Neutrophils, Absolute 4.74 10*3/mm3      Lymphocytes, Absolute 0.85 10*3/mm3      Monocytes, Absolute 0.65 10*3/mm3      Eosinophils, Absolute 0.00 10*3/mm3      Basophils, Absolute 0.00 10*3/mm3      Immature Grans, Absolute 0.01 10*3/mm3     Basic Metabolic Panel [[id number]]  (Abnormal) Collected:  11/16/17 0505    Specimen:  Blood Updated:  11/16/17 0547     Glucose 98 mg/dL      BUN 10 mg/dL      Creatinine 0.67 mg/dL      Sodium 143 mmol/L      Potassium 4.1 mmol/L      Chloride 105 mmol/L      CO2 29.0 mmol/L      Calcium 8.1 (L) mg/dL      eGFR Non African Amer 101 mL/min/1.73      BUN/Creatinine Ratio 14.9     Anion Gap 9.0 mmol/L     Narrative:       GFR Normal >60  Chronic Kidney Disease <60  Kidney Failure <15    C-reactive Protein [[id number]]  (Abnormal) Collected:  11/16/17 0505    Specimen:  Blood Updated:  11/16/17 0547     C-Reactive Protein 7.70 (H) mg/dL     Urine Culture - Urine, Urine, Clean Catch [[id number]]  (Normal) Collected:  11/14/17 2340    Specimen:  Urine from Urine, Clean Catch Updated:  11/16/17 0641     Urine Culture No growth at 24 hours        Imaging Results (last 72 hours)     Procedure Component Value Units Date/Time    CT Abdomen Pelvis With Contrast  [[id number]] Collected:  11/15/17 0725     Updated:  11/15/17 0737    Narrative:       EXAMINATION:  CT ABDOMEN PELVIS W CONTRAST-  11/14/2017 2:09 AM CST     HISTORY: Abdominal pain with nausea and vomiting. White blood cell count  of 26,020. There is a history of ulcerative colitis and anal fistula  repair.     TECHNIQUE: Spiral CT was performed of the abdomen and pelvis with  contrast. Multiplanar images were reconstructed.     DLP: 277 mGy-cm. Automated dosage control was utilized.     COMPARISON: No comparison study.      LUNG BASES: Minimal dependent atelectasis.     LIVER AND SPLEEN: There is fatty infiltration of the liver. Mild  distention of the gallbladder measuring 4.4 cm in short axis diameter.  Unremarkable spleen.     PANCREAS: No pancreatic mass or inflammatory change.     KIDNEYS AND ADRENALS: The kidneys and adrenal glands are unremarkable.  No bladder abnormality is detected.     BOWEL: The stomach and small bowel are distended with fluid with  multiple air-fluid levels. There is a moderately long segment of  abnormal distal ileum with wall thickening and stranding of the fat  around the distal small bowel. This extends to the ileocecal valve.  Small bowel measures up to 4.6 cm in diameter. The colon is decompressed  from the splenic flexure distally. The appendix is normal.     OTHER: There is a small amount of free fluid in the pelvis. There has  been prior tubal ligation. The uterus and ovaries demonstrate a normal  CT appearance. 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       Impression:       1. There is wall thickening of a fairly long segment of distal ileum  extending all the way to the ileocecal valve. Small bowel is dilated  proximal to the area of thickening and measures up to 4.6 cm in  diameter. There is gastric distention with fluid. There are air-fluid  levels throughout the dilated small bowel. There is decompression of the  colon from the splenic  flexure distally. These findings are consistent  with inflammation of the distal ileum. This would be a common finding in  patients with Crohn's disease. Ulcerative colitis with backwash ileitis  is possible. Yersinia infection and other infectious or inflammatory  diseases are also in the differential diagnosis.  2. Small amount of free fluid likely related to the above findings.  3. Fatty infiltration of the liver.        This report was finalized on 11/15/2017 07:34 by Dr. Kuldip Dent MD.    XR Abdomen 2 View With Chest 1 View [[id number]] Collected:  11/16/17 0754     Updated:  11/16/17 0759    Narrative:       EXAMINATION:  XR ABDOMEN 2 VW W CHEST 1 VW-  11/16/2017 4:00 AM CST     HISTORY: Small bowel obstruction. K50.019-Crohn's disease of small  intestine with unspecified complications.     COMPARISON: No comparison study.     CHEST X-RAY: The lungs are expanded and clear. Pleural spaces are clear.  The heart is normal in size.     SUPINE AND UPRIGHT ABDOMEN: There is an NG tube in place. The side-port  is near the gastroesophageal junction. The tube needs to be advanced  about 5 cm. The bowel gas pattern is near normal. There is one mildly  dilated small bowel loop in the left upper quadrant measuring 3.4 cm.  The gas pattern appears to be improved compared to the CT on 11/15/2017.  There is no organomegaly. Tubal ligation clips are noted.       Impression:       1.  Chest x-ray demonstrates no active disease.  2.  The bowel gas pattern is near normal. There is one mildly prominent  small bowel loop in the left upper quadrant measuring 3.4 cm. The gas  pattern appears to be improved compared to the previous CT study.  3. The NG tube needs to be advanced about 5 cm to get the tip and  side-port in the stomach. The side-port is now near the gastroesophageal  junction.        This report was finalized on 11/16/2017 07:56 by Dr. Kuldip Dent MD.              Assessment/Plan     Active Problems:    Bowel  obstruction    Crohn's disease of small intestine      We will DC NG-tube and begin clears      Damaris Blily MD  11/16/17  8:06 AM

## 2017-11-17 ENCOUNTER — TELEPHONE (OUTPATIENT)
Dept: GASTROENTEROLOGY | Facility: CLINIC | Age: 32
End: 2017-11-17

## 2017-11-17 LAB
ANION GAP SERPL CALCULATED.3IONS-SCNC: 5 MMOL/L (ref 4–13)
BACTERIA SPEC AEROBE CULT: ABNORMAL
BACTERIA SPEC AEROBE CULT: ABNORMAL
BASOPHILS # BLD AUTO: 0 10*3/MM3 (ref 0–0.2)
BASOPHILS NFR BLD AUTO: 0 % (ref 0–2)
BUN BLD-MCNC: 10 MG/DL (ref 5–21)
BUN/CREAT SERPL: 14.9 (ref 7–25)
CALCIUM SPEC-SCNC: 8.1 MG/DL (ref 8.4–10.4)
CHLORIDE SERPL-SCNC: 109 MMOL/L (ref 98–110)
CO2 SERPL-SCNC: 28 MMOL/L (ref 24–31)
CREAT BLD-MCNC: 0.67 MG/DL (ref 0.5–1.4)
DEPRECATED RDW RBC AUTO: 48.2 FL (ref 40–54)
EOSINOPHIL # BLD AUTO: 0 10*3/MM3 (ref 0–0.7)
EOSINOPHIL NFR BLD AUTO: 0 % (ref 0–4)
ERYTHROCYTE [DISTWIDTH] IN BLOOD BY AUTOMATED COUNT: 14.4 % (ref 12–15)
GFR SERPL CREATININE-BSD FRML MDRD: 101 ML/MIN/1.73
GLUCOSE BLD-MCNC: 94 MG/DL (ref 70–100)
HCT VFR BLD AUTO: 29.2 % (ref 37–47)
HGB BLD-MCNC: 9.4 G/DL (ref 12–16)
IMM GRANULOCYTES # BLD: 0.02 10*3/MM3 (ref 0–0.03)
IMM GRANULOCYTES NFR BLD: 0.3 % (ref 0–5)
LYMPHOCYTES # BLD AUTO: 1.23 10*3/MM3 (ref 0.72–4.86)
LYMPHOCYTES NFR BLD AUTO: 18.6 % (ref 15–45)
MCH RBC QN AUTO: 29.1 PG (ref 28–32)
MCHC RBC AUTO-ENTMCNC: 32.2 G/DL (ref 33–36)
MCV RBC AUTO: 90.4 FL (ref 82–98)
MONOCYTES # BLD AUTO: 0.86 10*3/MM3 (ref 0.19–1.3)
MONOCYTES NFR BLD AUTO: 13 % (ref 4–12)
NEUTROPHILS # BLD AUTO: 4.52 10*3/MM3 (ref 1.87–8.4)
NEUTROPHILS NFR BLD AUTO: 68.1 % (ref 39–78)
PLATELET # BLD AUTO: 263 10*3/MM3 (ref 130–400)
PMV BLD AUTO: 9.9 FL (ref 6–12)
POTASSIUM BLD-SCNC: 3.9 MMOL/L (ref 3.5–5.3)
RBC # BLD AUTO: 3.23 10*6/MM3 (ref 4.2–5.4)
SODIUM BLD-SCNC: 142 MMOL/L (ref 135–145)
WBC NRBC COR # BLD: 6.63 10*3/MM3 (ref 4.8–10.8)

## 2017-11-17 PROCEDURE — 25010000002 METHYLPREDNISOLONE PER 40 MG: Performed by: INTERNAL MEDICINE

## 2017-11-17 PROCEDURE — 99232 SBSQ HOSP IP/OBS MODERATE 35: CPT | Performed by: INTERNAL MEDICINE

## 2017-11-17 PROCEDURE — 80048 BASIC METABOLIC PNL TOTAL CA: CPT | Performed by: SPECIALIST

## 2017-11-17 PROCEDURE — 85025 COMPLETE CBC W/AUTO DIFF WBC: CPT | Performed by: SPECIALIST

## 2017-11-17 PROCEDURE — 25010000002 METHYLPREDNISOLONE PER 125 MG: Performed by: NURSE PRACTITIONER

## 2017-11-17 PROCEDURE — 25010000003 AMPICILLIN-SULBACTAM PER 1.5 G: Performed by: SPECIALIST

## 2017-11-17 RX ORDER — HYDROCODONE BITARTRATE AND ACETAMINOPHEN 7.5; 325 MG/1; MG/1
1 TABLET ORAL EVERY 6 HOURS PRN
Status: DISCONTINUED | OUTPATIENT
Start: 2017-11-17 | End: 2017-11-18 | Stop reason: HOSPADM

## 2017-11-17 RX ORDER — BUDESONIDE 3 MG/1
9 CAPSULE, COATED PELLETS ORAL DAILY
Status: DISCONTINUED | OUTPATIENT
Start: 2017-11-18 | End: 2017-11-18 | Stop reason: HOSPADM

## 2017-11-17 RX ORDER — METHYLPREDNISOLONE SODIUM SUCCINATE 40 MG/ML
40 INJECTION, POWDER, LYOPHILIZED, FOR SOLUTION INTRAMUSCULAR; INTRAVENOUS EVERY 12 HOURS
Status: DISCONTINUED | OUTPATIENT
Start: 2017-11-17 | End: 2017-11-17 | Stop reason: SDUPTHER

## 2017-11-17 RX ORDER — METHYLPREDNISOLONE SODIUM SUCCINATE 40 MG/ML
40 INJECTION, POWDER, LYOPHILIZED, FOR SOLUTION INTRAMUSCULAR; INTRAVENOUS EVERY 12 HOURS
Status: COMPLETED | OUTPATIENT
Start: 2017-11-17 | End: 2017-11-17

## 2017-11-17 RX ADMIN — AMPICILLIN SODIUM AND SULBACTAM SODIUM 3 G: 2; 1 INJECTION, POWDER, FOR SOLUTION INTRAMUSCULAR; INTRAVENOUS at 08:07

## 2017-11-17 RX ADMIN — AMPICILLIN SODIUM AND SULBACTAM SODIUM 3 G: 2; 1 INJECTION, POWDER, FOR SOLUTION INTRAMUSCULAR; INTRAVENOUS at 01:10

## 2017-11-17 RX ADMIN — METHYLPREDNISOLONE SODIUM SUCCINATE 40 MG: 40 INJECTION, POWDER, FOR SOLUTION INTRAMUSCULAR; INTRAVENOUS at 22:00

## 2017-11-17 RX ADMIN — SODIUM CHLORIDE 125 ML/HR: 9 INJECTION, SOLUTION INTRAVENOUS at 00:00

## 2017-11-17 RX ADMIN — AMPICILLIN SODIUM AND SULBACTAM SODIUM 3 G: 2; 1 INJECTION, POWDER, FOR SOLUTION INTRAMUSCULAR; INTRAVENOUS at 22:00

## 2017-11-17 RX ADMIN — METHYLPREDNISOLONE SODIUM SUCCINATE 60 MG: 125 INJECTION, POWDER, FOR SOLUTION INTRAMUSCULAR; INTRAVENOUS at 05:40

## 2017-11-17 RX ADMIN — AMPICILLIN SODIUM AND SULBACTAM SODIUM 3 G: 2; 1 INJECTION, POWDER, FOR SOLUTION INTRAMUSCULAR; INTRAVENOUS at 16:37

## 2017-11-17 RX ADMIN — METHYLPREDNISOLONE SODIUM SUCCINATE 60 MG: 125 INJECTION, POWDER, FOR SOLUTION INTRAMUSCULAR; INTRAVENOUS at 13:53

## 2017-11-17 NOTE — TELEPHONE ENCOUNTER
She has an appointment with me on January 22.  Do not change his appointment.  Please see if you can find appointment for her to see Keli in December to make sure she is seen on the right path until I am able to see her in January 22.    She is currently in the hospital.  She should be going home over the weekend so you can probably get a hold of her next week.    Zehra Solis MD

## 2017-11-17 NOTE — PROGRESS NOTES
[facility]  Inpatient Progress Note  Today's date:  11/17/17    Dhara Carmona  1985       Reason for Follow Up:  Abnormal CT, bowel obstruction, Crohn's of the small intestine    Subjective:   The patient is sitting up in bed this morning.  She states that she tolerated the clear liquid diet very well yesterday.  She states that she is having no nausea, vomiting, epigastric pain or hematemesis.  She states that she did have one small bowel movement this morning and is passing gas.  She denies any melena or hematochezia.  She is on Solu-Medrol 60 mg IV every 8 hours.  She is now on a regular diet as well.  She had this for lunch and has had no problems since then.  She is complaining of lower extremity edema and swelling.      Allergies   Allergen Reactions   • Guaifenesin Er Hives       Current Facility-Administered Medications:   •  ampicillin-sulbactam 3 g/100 mL 0.9% NS (MBP), 3 g, Intravenous, Q6H, Damaris Billy MD, Last Rate: 0 mL/hr at 11/16/17 1505, 3 g at 11/17/17 0807  •  benzocaine-menthol (CHLORASEPTIC) lozenge 1 lozenge, 1 lozenge, Mouth/Throat, Q2H PRN, Damaris Billy MD  •  HYDROcodone-acetaminophen (NORCO) 7.5-325 MG per tablet 1 tablet, 1 tablet, Oral, Q6H PRN, Damaris Billy MD  •  methylPREDNISolone sodium succinate (SOLU-Medrol) injection 60 mg, 60 mg, Intravenous, Q8H, Karla Choi APRN, 60 mg at 11/17/17 0540  •  Morphine sulfate (PF) injection 2 mg, 2 mg, Intravenous, Q2H PRN, Ben KAYE MD, 2 mg at 11/16/17 0126  •  ondansetron (ZOFRAN) injection 4 mg, 4 mg, Intravenous, Q4H PRN, Ben KAYE MD  •  phenol (CHLORASEPTIC) 1.4 % liquid 2 spray, 2 spray, Mouth/Throat, Q2H PRN, Damaris Billy MD, 2 spray at 11/15/17 1216  •  promethazine (PHENERGAN) injection 12.5 mg, 12.5 mg, Intravenous, Once, Ben KAYE MD  •  sodium chloride 0.9 % bolus 1,000 mL, 1,000 mL, Intravenous, Once, Ben KAYE MD, 1,000 mL at 11/15/17 1040  •  sodium chloride 0.9 %  flush 10 mL, 10 mL, Intravenous, PRN, Ben KAYE MD    Review of Systems:   Review of Systems   Constitutional: Negative for fever and unexpected weight change.   HENT: Negative for hearing loss.    Eyes: Negative for visual disturbance.   Respiratory: Negative for cough.    Cardiovascular: Negative for chest pain.   Gastrointestinal:        See HPI   Endocrine: Negative for cold intolerance and heat intolerance.   Genitourinary: Negative for dysuria.   Neurological: Negative for seizures.   Psychiatric/Behavioral: Negative for hallucinations.        Vital Signs:  Temp:  [97.9 °F (36.6 °C)-98.6 °F (37 °C)] 98 °F (36.7 °C)  Heart Rate:  [75-88] 75  Resp:  [16-18] 18  BP: (102-106)/(57-69) 105/68  Body mass index is 25.97 kg/(m^2).     Intake/Output Summary (Last 24 hours) at 11/17/17 1300  Last data filed at 11/17/17 0951   Gross per 24 hour   Intake             1549 ml   Output              600 ml   Net              949 ml     I/O this shift:  In: 240 [P.O.:240]  Out: -   Physical Exam:  Physical Exam   Constitutional: She appears well-developed.   Cardiovascular: Normal rate and regular rhythm.    Pulmonary/Chest: Effort normal.   Abdominal: Soft. Bowel sounds are normal.   Musculoskeletal: She exhibits edema.   Neurological: She is alert.   Psychiatric: She has a normal mood and affect.     I have performed the physical exam and agree with the findings as noted above.   DULCE Solis MD     Results Review:   I have reviewed all of the patient's current test results      Results from last 7 days  Lab Units 11/17/17  0448 11/16/17  0505 11/15/17  1101   WBC 10*3/mm3 6.63 6.25 10.96*   HEMOGLOBIN g/dL 9.4* 10.8* 12.0   HEMATOCRIT % 29.2* 34.3* 37.6   PLATELETS 10*3/mm3 263 329 349         Results from last 7 days  Lab Units 11/17/17  0448 11/16/17  0505 11/14/17  2126   SODIUM mmol/L 142 143 141   POTASSIUM mmol/L 3.9 4.1 4.1   CHLORIDE mmol/L 109 105 102   CO2 mmol/L 28.0 29.0 25.0   BUN mg/dL 10 10 17   CREATININE  mg/dL 0.67 0.67 0.73   CALCIUM mg/dL 8.1* 8.1* 10.1   BILIRUBIN mg/dL  --   --  0.4   ALK PHOS U/L  --   --  108   ALT (SGPT) U/L  --   --  37   AST (SGOT) U/L  --   --  34   GLUCOSE mg/dL 94 98 112*       Impression/Plan:  Patient Active Problem List   Diagnosis Code   • Bowel obstruction K56.609   • Crohn's disease of small intestine K50.00       Crohn's disease of small intestine with complication of small bowel obstruction-resolving  Dr. Negro discussed with the patient yesterday different modalities of treatment for Crohn's.  Acute panel for hepatitis A, B, C were all negative.  WBC 6.63.  CMP today unremarkable.  T spot is still pending.  Anticipate treatment with biologics as outpatient.  She is currently on Solu-Medrol 60 mg IV every 8 hours.  I will give her Solu-Medrol 40 mg IV later today and then change over to oral budesonide tomorrow.  I would rather use this than steroids due to adverse effects from prednisone. She has an appointment with me scheduled for 01/22/2018.  I am anticipating discharge tomorrow.  I have advised a low residue diet.    COLLIN Eduardo  11/17/17  9:27 AM    Zehra Solis MD  [facility]  11/17/17  1:00 PM    Much of this encounter note is an electronic transcription/translation of spoken language to printed text. The electronic translation of spoken language may permit erroneous, or at times, nonsensical words or phrases to be inadvertently transcribed; although I have reviewed the note for such errors, some may still exist.

## 2017-11-17 NOTE — PLAN OF CARE
Problem: Patient Care Overview (Adult)  Goal: Plan of Care Review  Outcome: Ongoing (interventions implemented as appropriate)    11/16/17 1721 11/16/17 2102   Coping/Psychosocial Response Interventions   Plan Of Care Reviewed With --  patient;family   Patient Care Overview   Progress improving --        Goal: Adult Individualization and Mutuality  Outcome: Ongoing (interventions implemented as appropriate)  Goal: Discharge Needs Assessment  Outcome: Ongoing (interventions implemented as appropriate)    Problem: Bowel Obstruction (Adult)  Goal: Signs and Symptoms of Listed Potential Problems Will be Absent or Manageable (Bowel Obstruction)  Outcome: Ongoing (interventions implemented as appropriate)    Problem: Fluid Volume Deficit (Adult)  Goal: Fluid/Electrolyte Balance  Outcome: Ongoing (interventions implemented as appropriate)  Goal: Comfort/Well Being  Outcome: Ongoing (interventions implemented as appropriate)

## 2017-11-17 NOTE — PROGRESS NOTES
Damaris Billy MD Progress Note     LOS: 2 days   Patient Care Team:  No Known Provider as PCP - General        Subjective     Had a bowel movement yesterday.  Tolerating liquids without nausea    Objective     Vital Signs  Temp:  [97.9 °F (36.6 °C)-98.6 °F (37 °C)] 98 °F (36.7 °C)  Heart Rate:  [75-92] 75  Resp:  [16-18] 18  BP: (102-106)/(57-69) 105/68    Intake & Output (last 3 days)       11/14 0701 - 11/15 0700 11/15 0701 - 11/16 0700 11/16 0701 - 11/17 0700 11/17 0701 - 11/18 0700    P.O.    240    I.V. (mL/kg)  1627 (27) 2810 (46.6)     Other  50      IV Piggyback 1000 200 100     Total Intake(mL/kg) 1000 (16.6) 1877 (31.1) 2910 (48.2) 240 (4)    Urine (mL/kg/hr)  1675 (1.2) 1900 (1.3)     Other  1350 (0.9) 800 (0.6)     Total Output   3025 2700      Net +1000 -1148 +210 +240            Unmeasured Urine Occurrence   800 x           Physical Exam:     General Appearance:    Alert, cooperative, in no acute distress   Lungs:     respirations regular, even and unlabored    Heart:    Regular rhythm and normal rate, normal S1 and S2, no            murmur, no gallop, no rub   Chest Wall:    No abnormalities observed   Abdomen:      soft nontender nondistended    Extremities: No edema, + SCDs   Results Review:     I reviewed the patient's new clinical results.    Lab Results (last 72 hours)     Procedure Component Value Units Date/Time    CBC & Differential [[id number]] Collected:  11/14/17 2126    Specimen:  Blood Updated:  11/14/17 2132    Narrative:       The following orders were created for panel order CBC & Differential.  Procedure                               Abnormality         Status                     ---------                               -----------         ------                     CBC Auto Differential[[id number]]         Abnormal            Final result                 Please view results for these tests on the individual orders.    CBC Auto Differential [[id number]]  (Abnormal) Collected:  11/14/17 2126     Specimen:  Blood Updated:  11/14/17 2132     WBC 26.02 (H) 10*3/mm3      RBC 4.65 10*6/mm3      Hemoglobin 13.6 g/dL      Hematocrit 40.8 %      MCV 87.7 fL      MCH 29.2 pg      MCHC 33.3 g/dL      RDW 14.0 %      RDW-SD 44.2 fl      MPV 10.1 fL      Platelets 395 10*3/mm3      Neutrophil % 89.9 (H) %      Lymphocyte % 4.3 (L) %      Monocyte % 5.0 %      Eosinophil % 0.2 %      Basophil % 0.1 %      Immature Grans % 0.5 %      Neutrophils, Absolute 23.38 (H) 10*3/mm3      Lymphocytes, Absolute 1.12 10*3/mm3      Monocytes, Absolute 1.31 (H) 10*3/mm3      Eosinophils, Absolute 0.05 10*3/mm3      Basophils, Absolute 0.03 10*3/mm3      Immature Grans, Absolute 0.13 (H) 10*3/mm3     Comprehensive Metabolic Panel [[id number]]  (Abnormal) Collected:  11/14/17 2126    Specimen:  Blood Updated:  11/14/17 2142     Glucose 112 (H) mg/dL      BUN 17 mg/dL      Creatinine 0.73 mg/dL      Sodium 141 mmol/L      Potassium 4.1 mmol/L      Chloride 102 mmol/L      CO2 25.0 mmol/L      Calcium 10.1 mg/dL      Total Protein 8.2 g/dL      Albumin 4.70 g/dL      ALT (SGPT) 37 U/L      AST (SGOT) 34 U/L      Alkaline Phosphatase 108 U/L      Total Bilirubin 0.4 mg/dL      eGFR Non African Amer 91 mL/min/1.73      Globulin 3.5 gm/dL      A/G Ratio 1.3 g/dL      BUN/Creatinine Ratio 23.3     Anion Gap 14.0 (H) mmol/L     Lipase [[id number]]  (Normal) Collected:  11/14/17 2126    Specimen:  Blood Updated:  11/14/17 2142     Lipase 43 U/L     [place] Draw [[id number]] Collected:  11/14/17 2126    Specimen:  Blood Updated:  11/14/17 2231    Narrative:       The following orders were created for panel order [place] Draw.  Procedure                               Abnormality         Status                     ---------                               -----------         ------                     Light Blue Top[[id number]]                                    Final result               Green Top (Gel)[[id number]]                                   Final  result               Lavender Top[[id number]]                                      Final result               Red Top[[id number]]                                           Final result                 Please view results for these tests on the individual orders.    Light Blue Top [[id number]] Collected:  11/14/17 2126    Specimen:  Blood Updated:  11/14/17 2231     Extra Tube hold for add-on      Auto resulted       Green Top (Gel) [[id number]] Collected:  11/14/17 2126    Specimen:  Blood Updated:  11/14/17 2231     Extra Tube Hold for add-ons.      Auto resulted.       Lavender Top [[id number]] Collected:  11/14/17 2126    Specimen:  Blood Updated:  11/14/17 2231     Extra Tube hold for add-on      Auto resulted       Red Top [[id number]] Collected:  11/14/17 2126    Specimen:  Blood Updated:  11/14/17 2231     Extra Tube Hold for add-ons.      Auto resulted.       Pregnancy, Urine - Urine, Clean Catch [[id number]]  (Normal) Collected:  11/14/17 2340    Specimen:  Urine from Urine, Clean Catch Updated:  11/15/17 0428     HCG, Urine QL Negative    Urinalysis With / Culture If Indicated - Urine, Clean Catch [[id number]]  (Abnormal) Collected:  11/14/17 2340    Specimen:  Urine from Urine, Clean Catch Updated:  11/15/17 0559     Color, UA Dark Yellow (A)     Appearance, UA Cloudy (A)     pH, UA 5.5     Specific Gravity, UA >1.030 (H)     Glucose, UA Negative     Ketones, UA Trace (A)     Bilirubin, UA Small (1+) (A)     Blood, UA Negative     Protein, UA Trace (A)     Leuk Esterase, UA Trace (A)     Nitrite, UA Negative     Urobilinogen, UA 1.0 E.U./dL    Urinalysis, Microscopic Only - Urine, Clean Catch [[id number]]  (Abnormal) Collected:  11/14/17 2340    Specimen:  Urine from Urine, Clean Catch Updated:  11/15/17 0559     RBC, UA 3-5 (A) /HPF      WBC, UA 3-5 (A) /HPF      Bacteria, UA 1+ (A) /HPF      Squamous Epithelial Cells, UA 7-12 (A) /HPF      Mucus, UA Large/3+ (A) /HPF      Methodology Automated Microscopy     Sedimentation Rate [[id number]]  (Normal) Collected:  11/15/17 1101    Specimen:  Blood Updated:  11/15/17 1125     Sed Rate 2 mm/hr     CBC & Differential [[id number]] Collected:  11/15/17 1101    Specimen:  Blood Updated:  11/15/17 1127    Narrative:       The following orders were created for panel order CBC & Differential.  Procedure                               Abnormality         Status                     ---------                               -----------         ------                     CBC Auto Differential[[id number]]        Abnormal            Final result                 Please view results for these tests on the individual orders.    CBC Auto Differential [[id number]]  (Abnormal) Collected:  11/15/17 1101    Specimen:  Blood Updated:  11/15/17 1127     WBC 10.96 (H) 10*3/mm3      RBC 4.17 (L) 10*6/mm3      Hemoglobin 12.0 g/dL      Hematocrit 37.6 %      MCV 90.2 fL      MCH 28.8 pg      MCHC 31.9 (L) g/dL      RDW 14.2 %      RDW-SD 46.8 fl      MPV 10.0 fL      Platelets 349 10*3/mm3      Neutrophil % 80.1 (H) %      Lymphocyte % 9.0 (L) %      Monocyte % 9.9 %      Eosinophil % 0.5 %      Basophil % 0.1 %      Immature Grans % 0.4 %      Neutrophils, Absolute 8.79 (H) 10*3/mm3      Lymphocytes, Absolute 0.99 10*3/mm3      Monocytes, Absolute 1.08 10*3/mm3      Eosinophils, Absolute 0.05 10*3/mm3      Basophils, Absolute 0.01 10*3/mm3      Immature Grans, Absolute 0.04 (H) 10*3/mm3     Hepatitis Panel, Acute [[id number]]  (Normal) Collected:  11/14/17 2126    Specimen:  Blood Updated:  11/15/17 1657     HCV S/C Ratio 0.07     Hepatitis C Ab Negative     Hep A IgM Negative     Hep B C IgM Negative     Hepatitis B Surface Ag Negative    TSPOT [[id number]] Collected:  11/16/17 0505    Specimen:  Blood Updated:  11/16/17 0518    CBC & Differential [[id number]] Collected:  11/16/17 0505    Specimen:  Blood Updated:  11/16/17 0530    Narrative:       The following orders were created for panel order CBC &  Differential.  Procedure                               Abnormality         Status                     ---------                               -----------         ------                     CBC Auto Differential[[id number]]        Abnormal            Final result                 Please view results for these tests on the individual orders.    CBC Auto Differential [[id number]]  (Abnormal) Collected:  11/16/17 0505    Specimen:  Blood Updated:  11/16/17 0530     WBC 6.25 10*3/mm3      RBC 3.78 (L) 10*6/mm3      Hemoglobin 10.8 (L) g/dL      Hematocrit 34.3 (L) %      MCV 90.7 fL      MCH 28.6 pg      MCHC 31.5 (L) g/dL      RDW 14.4 %      RDW-SD 47.6 fl      MPV 9.9 fL      Platelets 329 10*3/mm3      Neutrophil % 75.8 %      Lymphocyte % 13.6 (L) %      Monocyte % 10.4 %      Eosinophil % 0.0 %      Basophil % 0.0 %      Immature Grans % 0.2 %      Neutrophils, Absolute 4.74 10*3/mm3      Lymphocytes, Absolute 0.85 10*3/mm3      Monocytes, Absolute 0.65 10*3/mm3      Eosinophils, Absolute 0.00 10*3/mm3      Basophils, Absolute 0.00 10*3/mm3      Immature Grans, Absolute 0.01 10*3/mm3     Basic Metabolic Panel [[id number]]  (Abnormal) Collected:  11/16/17 0505    Specimen:  Blood Updated:  11/16/17 0547     Glucose 98 mg/dL      BUN 10 mg/dL      Creatinine 0.67 mg/dL      Sodium 143 mmol/L      Potassium 4.1 mmol/L      Chloride 105 mmol/L      CO2 29.0 mmol/L      Calcium 8.1 (L) mg/dL      eGFR Non African Amer 101 mL/min/1.73      BUN/Creatinine Ratio 14.9     Anion Gap 9.0 mmol/L     Narrative:       GFR Normal >60  Chronic Kidney Disease <60  Kidney Failure <15    C-reactive Protein [[id number]]  (Abnormal) Collected:  11/16/17 0505    Specimen:  Blood Updated:  11/16/17 0547     C-Reactive Protein 7.70 (H) mg/dL     CBC & Differential [[id number]] Collected:  11/17/17 0448    Specimen:  Blood Updated:  11/17/17 0518    Narrative:       The following orders were created for panel order CBC & Differential.  Procedure                                Abnormality         Status                     ---------                               -----------         ------                     CBC Auto Differential[[id number]]        Abnormal            Final result                 Please view results for these tests on the individual orders.    CBC Auto Differential [[id number]]  (Abnormal) Collected:  11/17/17 0448    Specimen:  Blood Updated:  11/17/17 0518     WBC 6.63 10*3/mm3      RBC 3.23 (L) 10*6/mm3      Hemoglobin 9.4 (L) g/dL      Hematocrit 29.2 (L) %      MCV 90.4 fL      MCH 29.1 pg      MCHC 32.2 (L) g/dL      RDW 14.4 %      RDW-SD 48.2 fl      MPV 9.9 fL      Platelets 263 10*3/mm3      Neutrophil % 68.1 %      Lymphocyte % 18.6 %      Monocyte % 13.0 (H) %      Eosinophil % 0.0 %      Basophil % 0.0 %      Immature Grans % 0.3 %      Neutrophils, Absolute 4.52 10*3/mm3      Lymphocytes, Absolute 1.23 10*3/mm3      Monocytes, Absolute 0.86 10*3/mm3      Eosinophils, Absolute 0.00 10*3/mm3      Basophils, Absolute 0.00 10*3/mm3      Immature Grans, Absolute 0.02 10*3/mm3     Basic Metabolic Panel [[id number]]  (Abnormal) Collected:  11/17/17 0448    Specimen:  Blood Updated:  11/17/17 0535     Glucose 94 mg/dL      BUN 10 mg/dL      Creatinine 0.67 mg/dL      Sodium 142 mmol/L      Potassium 3.9 mmol/L      Chloride 109 mmol/L      CO2 28.0 mmol/L      Calcium 8.1 (L) mg/dL      eGFR Non African Amer 101 mL/min/1.73      BUN/Creatinine Ratio 14.9     Anion Gap 5.0 mmol/L     Narrative:       GFR Normal >60  Chronic Kidney Disease <60  Kidney Failure <15    Urine Culture - Urine, Urine, Clean Catch [[id number]]  (Abnormal) Collected:  11/14/17 2340    Specimen:  Urine from Urine, Clean Catch Updated:  11/17/17 0932     Urine Culture --      >100,000 CFU/mL Lactobacillus species (A)    Narrative:       Probable contaminant        Imaging Results (last 72 hours)     Procedure Component Value Units Date/Time    CT Abdomen Pelvis With  Contrast [[id number]] Collected:  11/15/17 0725     Updated:  11/15/17 0737    Narrative:       EXAMINATION:  CT ABDOMEN PELVIS W CONTRAST-  11/14/2017 2:09 AM CST     HISTORY: Abdominal pain with nausea and vomiting. White blood cell count  of 26,020. There is a history of ulcerative colitis and anal fistula  repair.     TECHNIQUE: Spiral CT was performed of the abdomen and pelvis with  contrast. Multiplanar images were reconstructed.     DLP: 277 mGy-cm. Automated dosage control was utilized.     COMPARISON: No comparison study.      LUNG BASES: Minimal dependent atelectasis.     LIVER AND SPLEEN: There is fatty infiltration of the liver. Mild  distention of the gallbladder measuring 4.4 cm in short axis diameter.  Unremarkable spleen.     PANCREAS: No pancreatic mass or inflammatory change.     KIDNEYS AND ADRENALS: The kidneys and adrenal glands are unremarkable.  No bladder abnormality is detected.     BOWEL: The stomach and small bowel are distended with fluid with  multiple air-fluid levels. There is a moderately long segment of  abnormal distal ileum with wall thickening and stranding of the fat  around the distal small bowel. This extends to the ileocecal valve.  Small bowel measures up to 4.6 cm in diameter. The colon is decompressed  from the splenic flexure distally. The appendix is normal.     OTHER: There is a small amount of free fluid in the pelvis. There has  been prior tubal ligation. The uterus and ovaries demonstrate a normal  CT appearance. 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       Impression:       1. There is wall thickening of a fairly long segment of distal ileum  extending all the way to the ileocecal valve. Small bowel is dilated  proximal to the area of thickening and measures up to 4.6 cm in  diameter. There is gastric distention with fluid. There are air-fluid  levels throughout the dilated small bowel. There is decompression of the  colon from the  splenic flexure distally. These findings are consistent  with inflammation of the distal ileum. This would be a common finding in  patients with Crohn's disease. Ulcerative colitis with backwash ileitis  is possible. Yersinia infection and other infectious or inflammatory  diseases are also in the differential diagnosis.  2. Small amount of free fluid likely related to the above findings.  3. Fatty infiltration of the liver.        This report was finalized on 11/15/2017 07:34 by Dr. Kuldip Dent MD.    XR Abdomen 2 View With Chest 1 View [[id number]] Collected:  11/16/17 0754     Updated:  11/16/17 0759    Narrative:       EXAMINATION:  XR ABDOMEN 2 VW W CHEST 1 VW-  11/16/2017 4:00 AM CST     HISTORY: Small bowel obstruction. K50.019-Crohn's disease of small  intestine with unspecified complications.     COMPARISON: No comparison study.     CHEST X-RAY: The lungs are expanded and clear. Pleural spaces are clear.  The heart is normal in size.     SUPINE AND UPRIGHT ABDOMEN: There is an NG tube in place. The side-port  is near the gastroesophageal junction. The tube needs to be advanced  about 5 cm. The bowel gas pattern is near normal. There is one mildly  dilated small bowel loop in the left upper quadrant measuring 3.4 cm.  The gas pattern appears to be improved compared to the CT on 11/15/2017.  There is no organomegaly. Tubal ligation clips are noted.       Impression:       1.  Chest x-ray demonstrates no active disease.  2.  The bowel gas pattern is near normal. There is one mildly prominent  small bowel loop in the left upper quadrant measuring 3.4 cm. The gas  pattern appears to be improved compared to the previous CT study.  3. The NG tube needs to be advanced about 5 cm to get the tip and  side-port in the stomach. The side-port is now near the gastroesophageal  junction.        This report was finalized on 11/16/2017 07:56 by Dr. Kuldip Dent MD.              Assessment/Plan     Active Problems:     Bowel obstruction    Crohn's disease of small intestine      Discussed with GI.  Advance to regular diet.  Defer management of Crohn's and discharged to the GI service.  We will not need follow-up with me      Damaris Billy MD  11/17/17  9:57 AM

## 2017-11-18 VITALS
DIASTOLIC BLOOD PRESSURE: 85 MMHG | HEIGHT: 60 IN | RESPIRATION RATE: 18 BRPM | WEIGHT: 133 LBS | SYSTOLIC BLOOD PRESSURE: 130 MMHG | TEMPERATURE: 98.3 F | BODY MASS INDEX: 26.11 KG/M2 | HEART RATE: 76 BPM | OXYGEN SATURATION: 96 %

## 2017-11-18 PROCEDURE — 25010000003 AMPICILLIN-SULBACTAM PER 1.5 G: Performed by: SPECIALIST

## 2017-11-18 PROCEDURE — 99232 SBSQ HOSP IP/OBS MODERATE 35: CPT | Performed by: INTERNAL MEDICINE

## 2017-11-18 RX ORDER — BUDESONIDE 3 MG/1
CAPSULE, COATED PELLETS ORAL
Qty: 132 CAPSULE | Refills: 0 | Status: SHIPPED | OUTPATIENT
Start: 2017-11-18 | End: 2018-01-22

## 2017-11-18 RX ORDER — BUDESONIDE 3 MG/1
6 CAPSULE, COATED PELLETS ORAL DAILY
Qty: 60 CAPSULE | Refills: 0 | Status: SHIPPED | OUTPATIENT
Start: 2017-11-19 | End: 2017-11-18

## 2017-11-18 RX ADMIN — AMPICILLIN SODIUM AND SULBACTAM SODIUM 3 G: 2; 1 INJECTION, POWDER, FOR SOLUTION INTRAMUSCULAR; INTRAVENOUS at 08:06

## 2017-11-18 RX ADMIN — BUDESONIDE 9 MG: 3 CAPSULE ORAL at 08:06

## 2017-11-18 RX ADMIN — AMPICILLIN SODIUM AND SULBACTAM SODIUM 3 G: 2; 1 INJECTION, POWDER, FOR SOLUTION INTRAMUSCULAR; INTRAVENOUS at 02:32

## 2017-11-18 NOTE — PLAN OF CARE
Problem: Patient Care Overview (Adult)  Goal: Plan of Care Review  Outcome: Ongoing (interventions implemented as appropriate)    11/17/17 1837   Coping/Psychosocial Response Interventions   Plan Of Care Reviewed With patient   Patient Care Overview   Progress improving       Goal: Adult Individualization and Mutuality  Outcome: Ongoing (interventions implemented as appropriate)  Goal: Discharge Needs Assessment  Outcome: Ongoing (interventions implemented as appropriate)    Problem: Bowel Obstruction (Adult)  Goal: Signs and Symptoms of Listed Potential Problems Will be Absent or Manageable (Bowel Obstruction)  Outcome: Ongoing (interventions implemented as appropriate)  Tolerating diet without abdominal pain     11/17/17 1837   Bowel Obstruction   Problems Assessed (Bowel Obstruction) all   Problems Present (Bowel Obstruction) none         Problem: Fluid Volume Deficit (Adult)  Goal: Fluid/Electrolyte Balance  Outcome: Ongoing (interventions implemented as appropriate)  Better after fluids dcd    11/17/17 1837   Fluid Volume Deficit (Adult)   Fluid/Electrolyte Balance making progress toward outcome       Goal: Comfort/Well Being  Outcome: Ongoing (interventions implemented as appropriate)  Remains with edema to legs today will IID fluids and decrease steroids     11/17/17 1837   Fluid Volume Deficit (Adult)   Comfort/Well Being making progress toward outcome

## 2017-11-18 NOTE — PROGRESS NOTES
[facility]  Inpatient Progress Note  Today's date:  11/18/17    Dhara Carmona  1985       Reason for Follow Up:  Crohn's disease    Subjective:   She is on a low residue diet and is doing well.  No nausea or vomiting.  No diarrhea.  No melena or hematochezia.  She denies any abdominal pain.    Allergies   Allergen Reactions   • Guaifenesin Er Hives       Current Facility-Administered Medications:   •  ampicillin-sulbactam 3 g/100 mL 0.9% NS (MBP), 3 g, Intravenous, Q6H, Damaris Billy MD, Last Rate: 0 mL/hr at 11/17/17 2300, 3 g at 11/18/17 0806  •  benzocaine-menthol (CHLORASEPTIC) lozenge 1 lozenge, 1 lozenge, Mouth/Throat, Q2H PRN, Damaris Billy MD  •  Budesonide (ENTOCORT EC) 24 hr capsule 9 mg, 9 mg, Oral, Daily, Zehra Solis MD, 9 mg at 11/18/17 0806  •  HYDROcodone-acetaminophen (NORCO) 7.5-325 MG per tablet 1 tablet, 1 tablet, Oral, Q6H PRN, Damaris Billy MD  •  Morphine sulfate (PF) injection 2 mg, 2 mg, Intravenous, Q2H PRN, Ben KAYE MD, 2 mg at 11/16/17 0126  •  ondansetron (ZOFRAN) injection 4 mg, 4 mg, Intravenous, Q4H PRN, Ben KAYE MD  •  phenol (CHLORASEPTIC) 1.4 % liquid 2 spray, 2 spray, Mouth/Throat, Q2H PRN, Damaris Billy MD, 2 spray at 11/15/17 1216  •  promethazine (PHENERGAN) injection 12.5 mg, 12.5 mg, Intravenous, Once, Ben KAYE MD  •  sodium chloride 0.9 % bolus 1,000 mL, 1,000 mL, Intravenous, Once, Ben KAYE MD, 1,000 mL at 11/15/17 1040  •  sodium chloride 0.9 % flush 10 mL, 10 mL, Intravenous, PRN, Ben KAYE MD    Review of Systems:   Review of Systems   Constitutional: Negative for fever.   Respiratory: Negative for cough and shortness of breath.    Cardiovascular: Negative for chest pain.   Genitourinary: Negative for dysuria.   Skin: Negative for rash.   Neurological: Negative for seizures.        Vital Signs:  Temp:  [98.3 °F (36.8 °C)-98.4 °F (36.9 °C)] 98.3 °F (36.8 °C)  Heart Rate:  [67-76] 76  Resp:  [18] 18  BP:  (117-130)/(81-85) 130/85  Body mass index is 25.97 kg/(m^2).     Intake/Output Summary (Last 24 hours) at 11/18/17 1213  Last data filed at 11/18/17 0806   Gross per 24 hour   Intake              800 ml   Output                0 ml   Net              800 ml     I/O this shift:  In: 100 [IV Piggyback:100]  Out: -   Physical Exam:  Physical Exam   Constitutional: She is oriented to person, place, and time. She appears well-developed.   Eyes: No scleral icterus.   Cardiovascular: Normal rate and regular rhythm.    Pulmonary/Chest: Breath sounds normal.   Abdominal: Soft. Bowel sounds are normal. She exhibits no distension and no mass. There is no tenderness. There is no rebound and no guarding.   Musculoskeletal: Normal range of motion.   Neurological: She is alert and oriented to person, place, and time.   Skin: No rash noted.   Psychiatric: She has a normal mood and affect. Her behavior is normal.   Vitals reviewed.       Results Review:   I have reviewed all of the patient's current test results      Results from last 7 days  Lab Units 11/17/17  0448 11/16/17  0505 11/15/17  1101   WBC 10*3/mm3 6.63 6.25 10.96*   HEMOGLOBIN g/dL 9.4* 10.8* 12.0   HEMATOCRIT % 29.2* 34.3* 37.6   PLATELETS 10*3/mm3 263 329 349         Results from last 7 days  Lab Units 11/17/17 0448 11/16/17  0505 11/14/17 2126   SODIUM mmol/L 142 143 141   POTASSIUM mmol/L 3.9 4.1 4.1   CHLORIDE mmol/L 109 105 102   CO2 mmol/L 28.0 29.0 25.0   BUN mg/dL 10 10 17   CREATININE mg/dL 0.67 0.67 0.73   CALCIUM mg/dL 8.1* 8.1* 10.1   BILIRUBIN mg/dL  --   --  0.4   ALK PHOS U/L  --   --  108   ALT (SGPT) U/L  --   --  37   AST (SGOT) U/L  --   --  34   GLUCOSE mg/dL 94 98 112*               Lab Results  Lab Value Date/Time   LIPASE 43 11/14/2017 2126       Radiology Review:  Imaging Results (last 24 hours)     ** No results found for the last 24 hours. **          Impression/Plan:  Patient Active Problem List   Diagnosis Code   • Bowel obstruction  K56.609   • Crohn's disease of small intestine K50.00     Crohn's disease of small intestine with complication of small bowel obstruction-resolving  Acute panel for hepatitis A, B, C were all negative.  WBC 6.63.  CMP today unremarkable.  T spot is still pending.  Anticipate treatment with biologics as outpatient.  She is currently off of IV steroids and on budesonide.  I will send a prescription to her pharmacy.   I would rather use this than steroids due to adverse effects from prednisone. She has an appointment with me scheduled for 01/22/2018.  I am anticipating discharge tomorrow.  I have advised a low residue diet.  OK to discharge.    Zehra Solis MD  [facility]  11/18/17  12:13 PM    Much of this encounter note is an electronic transcription/translation of spoken language to printed text. The electronic translation of spoken language may permit erroneous, or at times, nonsensical words or phrases to be inadvertently transcribed; although I have reviewed the note for such errors, some may still exist.

## 2017-11-18 NOTE — DISCHARGE SUMMARY
Date of Discharge:  11/18/2017    Discharge Diagnosis: Flare of Crohn's disease     Presenting Problem/History of Present Illness      Dhara Carmona  is a 32 y.o. female presents with onset of abdominal pain nausea vomiting and bloating yesterday progressively worsened through the day.  Came to the ER for evaluation after vomiting.  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  Treated by Dr. Solis.  Last bowel movement was yesterday and normal..    She was subsequently admitted and monitored she was given steroids and had improvement, she was seen by her gastroenterologist Dr. Zehra Solis with the below findings.      Crohn's disease of small intestine with complication of small bowel obstruction-resolving  Dr. Negro discussed with the patient yesterday different modalities of treatment for Crohn's.  Acute panel for hepatitis A, B, C were all negative.  WBC 6.63.  CMP today unremarkable.  T spot is still pending.  Anticipate treatment with biologics as outpatient.  She is currently on Solu-Medrol 60 mg IV every 8 hours.  I will give her Solu-Medrol 40 mg IV later today and then change over to oral budesonide tomorrow.  I would rather use this than steroids due to adverse effects from prednisone. She has an appointment with me scheduled for 01/22/2018.  I am anticipating discharge tomorrow.  I have advised a low residue diet.    Currently she has done well with her treatment currently plan to discharge her to her home and follow-up with Dr. Zehra Solis.       Procedures Performed         Consults:   Consults     Date and Time Order Name Status Description    11/15/2017 0858 Inpatient Consult to Gastroenterology Completed           Pertinent Test Results:   Lab Results (last 24 hours)     ** No results found for the last 24 hours. **            Condition on Discharge:  Good condition  Discharge Disposition discharged to home      Discharge Medications   Dhara Carmona   Home Medication  Instructions NATHANAEL:[id number]    Printed on:11/18/17 1030   Medication Information                      mesalamine (PENTASA) 500 MG CR capsule  Take 2 capsules by mouth 3 (Three) Times a Day.                 Discharge Diet:  regular diet    Activity at Discharge:  no limitations    Follow-up Appointments follow-up with Dr. Zehra Solis  Future Appointments  Date Time Provider Department Center   1/22/2018 2:30 PM Zehra Solis MD [facility] None         Test Results Pending at Discharge   Order Current Status    TSPOT In process           Chiki Penn MD  11/18/17  10:30 AM    Time: Time spent at discharge 30 minutes     EMR Dragon/Transcription disclaimer: Much of this encounter note is an electronic transcription/translation of spoken language to printed text. The electronic translation of spoken language may permit erroneous, or at times, nonsensical words or phrases to be inadvertently transcribed; although I have reviewed the note for such errors, some may still exist.

## 2017-11-18 NOTE — PLAN OF CARE
Problem: Bowel Obstruction (Adult)  Goal: Signs and Symptoms of Listed Potential Problems Will be Absent or Manageable (Bowel Obstruction)  Outcome: Outcome(s) achieved Date Met:  11/18/17 11/18/17 0417   Bowel Obstruction   Problems Assessed (Bowel Obstruction) all   Problems Present (Bowel Obstruction) none         Problem: Fluid Volume Deficit (Adult)  Goal: Fluid/Electrolyte Balance  Outcome: Ongoing (interventions implemented as appropriate)    11/18/17 0417   Fluid Volume Deficit (Adult)   Fluid/Electrolyte Balance making progress toward outcome       Goal: Comfort/Well Being  Outcome: Ongoing (interventions implemented as appropriate)    11/18/17 0417   Fluid Volume Deficit (Adult)   Comfort/Well Being making progress toward outcome

## 2017-11-20 LAB — REF LAB TEST METHOD: NORMAL

## 2017-11-20 NOTE — PAYOR COMM NOTE
"NM HOME 11-18-17  [id number]      Dhara Santiago (32 y.o. Female)     Date of Birth Social Security Number Address Home Phone MRN    1985  [address] [phone] [id number]    Taoist Marital Status          Jainism        Admission Date Admission Type Admitting Provider Attending Provider Department, Room/Bed    11/14/17 Emergency Damaris Billy MD  [facility] 3C, 374/1    Discharge Date Discharge Disposition Discharge Destination        11/18/2017 Home or Self Care             Attending Provider: (none)    Allergies:  Guaifenesin Er    Isolation:  None   Infection:  None   Code Status:  Prior    Ht:  60\" (152.4 cm)   Wt:  133 lb (60.3 kg)    Admission Cmt:  None   Principal Problem:  None                Active Insurance as of 11/14/2017     Primary Coverage     Payor Plan Insurance Group Employer/Plan Group    [employer] MEDICAID     Payor Plan Address Payor Plan Phone Number Effective From Effective To    [address] [phone] 11/1/2016     [place] [address]       Subscriber Name Subscriber Birth Date Member ID       DHARA SANTIAGO 1985 [id number]                 Emergency Contacts      (Rel.) Home Phone Work Phone Mobile Phone    Contact,No [phone] -- --               Discharge Summary      Chiki Penn MD at 11/18/2017 10:30 AM              Date of Discharge:  11/18/2017    Discharge Diagnosis: Flare of Crohn's disease     Presenting Problem/History of Present Illness      Dhara Santiago  is a 32 y.o. female presents with onset of abdominal pain nausea vomiting and bloating yesterday progressively worsened through the day.  Came to the ER for evaluation after vomiting.  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  Treated by Dr. Solis.  Last bowel movement was yesterday and normal..    She was subsequently admitted and monitored she was given steroids and had improvement, she was seen by " her gastroenterologist Dr. Zehra Solis with the below findings.      Crohn's disease of small intestine with complication of small bowel obstruction-resolving  Dr. Negro discussed with the patient yesterday different modalities of treatment for Crohn's.  Acute panel for hepatitis A, B, C were all negative.  WBC 6.63.  CMP today unremarkable.  T spot is still pending.  Anticipate treatment with biologics as outpatient.  She is currently on Solu-Medrol 60 mg IV every 8 hours.  I will give her Solu-Medrol 40 mg IV later today and then change over to oral budesonide tomorrow.  I would rather use this than steroids due to adverse effects from prednisone. She has an appointment with me scheduled for 01/22/2018.  I am anticipating discharge tomorrow.  I have advised a low residue diet.    Currently she has done well with her treatment currently plan to discharge her to her home and follow-up with Dr. Zehra Solis.       Procedures Performed         Consults:   Consults     Date and Time Order Name Status Description    11/15/2017 0858 Inpatient Consult to Gastroenterology Completed           Pertinent Test Results:   Lab Results (last 24 hours)     ** No results found for the last 24 hours. **            Condition on Discharge:  Good condition  Discharge Disposition discharged to home      Discharge Medications   Dhara Carmona   Home Medication Instructions NATHANAEL:[id number]    Printed on:11/18/17 1030   Medication Information                      mesalamine (PENTASA) 500 MG CR capsule  Take 2 capsules by mouth 3 (Three) Times a Day.                 Discharge Diet:  regular diet    Activity at Discharge:  no limitations    Follow-up Appointments follow-up with Dr. Zehra Solis  Future Appointments  Date Time Provider Department Center   1/22/2018 2:30 PM Zehra Solis MD [facility] None         Test Results Pending at Discharge   Order Current Status    TSPOT In process           Chiki Penn MD  11/18/17  10:30  AM    Time: Time spent at discharge 30 minutes     EMR Dragon/Transcription disclaimer: Much of this encounter note is an electronic transcription/translation of spoken language to printed text. The electronic translation of spoken language may permit erroneous, or at times, nonsensical words or phrases to be inadvertently transcribed; although I have reviewed the note for such errors, some may still exist.           Electronically signed by Chiki Penn MD at 11/18/2017 10:32 AM

## 2017-12-12 ENCOUNTER — OFFICE VISIT (OUTPATIENT)
Dept: GASTROENTEROLOGY | Facility: CLINIC | Age: 32
End: 2017-12-12

## 2017-12-12 VITALS
DIASTOLIC BLOOD PRESSURE: 64 MMHG | OXYGEN SATURATION: 99 % | HEART RATE: 84 BPM | BODY MASS INDEX: 26.9 KG/M2 | SYSTOLIC BLOOD PRESSURE: 100 MMHG | HEIGHT: 60 IN | WEIGHT: 137 LBS | TEMPERATURE: 97.5 F

## 2017-12-12 DIAGNOSIS — K50.012 CROHN'S DISEASE OF SMALL INTESTINE WITH INTESTINAL OBSTRUCTION (HCC): Primary | ICD-10-CM

## 2017-12-12 DIAGNOSIS — K56.691 OTHER COMPLETE INTESTINAL OBSTRUCTION (HCC): ICD-10-CM

## 2017-12-12 DIAGNOSIS — R11.2 NON-INTRACTABLE VOMITING WITH NAUSEA, UNSPECIFIED VOMITING TYPE: ICD-10-CM

## 2017-12-12 PROCEDURE — 99214 OFFICE O/P EST MOD 30 MIN: CPT | Performed by: NURSE PRACTITIONER

## 2017-12-12 NOTE — PROGRESS NOTES
Chief Complaint:   Chief Complaint   Patient presents with   • Follow-up     Patient is here today following from hospital with bowel obstruction.         Patient ID: Dhara Carmona is a 32 y.o. female     History of Present Illness: This is a very pleasant 32-year-old female who has a history of Crohn's disease diagnosed in 2002 after a perirectal abscess.  The patient was most recently admitted to the hospital and is here for follow-up today.  According to records the patient was admitted to the hospital on 11/15/17 with complaints of nausea, vomiting, abdominal pain and abdominal distention..  CT of the abdomen and pelvis was performed that revealed wall thickening of the distal ileum extending all the way to the ileocecal valve, small bowel dilated ox multiple area of thickening and measured up to 4.6 cm in diameter, gastric distention with fluid noted.  The patient was treated for bowel obstruction.  She had had a history of a small bowel obstruction treated conservatively at [facility] on September 2017.  Prior to that she stated she had had no significant trouble with her Crohn's disease.    GI was consult did and it was felt that she had a distal small bowel obstruction secondary to active Crohn's disease.  Possibly could be fibrotic disease from her Crohn's.  IV steroids were initiated the patient was kept nothing by mouth and her symptoms resolved.  The patient states that she has continued on a low residue diet since discharge and has had no more symptoms.  Different modalities of treatment for her Crohn's was discussed with the patient.  An acute panel for hepatitis A, B, C were found to be negative.  WBC was 6.63.  CMP was unremarkable.  A T spot was found to be negative.  The patient has a follow-up appointment with Dr. Solis on 1/22/18 to discuss possible Biologics.    She denies any nausea, vomiting, epigastric pain, dysphagia or hematemesis.  She denies any fever or chills.  She denies any  "abdominal pain.  She denies any melena or hematochezia.  She denies any constipation or diarrhea.  She denies any unintentional weight loss or loss of appetite.           Past Medical History:   Diagnosis Date   • Crohn's disease        Past Surgical History:   Procedure Laterality Date   • ANAL FISTULA REPAIR     •  SECTION     • COLONOSCOPY  2016   • DILATATION AND CURETTAGE           Current Outpatient Prescriptions:   •  Budesonide (ENTOCORT EC) 3 MG 24 hr capsule, Take 3 pills by mouth x 30 days, take 2 pills by mouth x 2 weeks, take 1 pill by mouth x 2 weeks, Disp: 132 capsule, Rfl: 0  •  mesalamine (PENTASA) 500 MG CR capsule, Take 2 capsules by mouth 3 (Three) Times a Day., Disp: 180 capsule, Rfl: 5    Allergies   Allergen Reactions   • Guaifenesin Er Hives       Social History     Social History   • Marital status:      Spouse name: N/A   • Number of children: N/A   • Years of education: N/A     Occupational History   • Not on file.     Social History Main Topics   • Smoking status: Never Smoker   • Smokeless tobacco: Never Used   • Alcohol use No   • Drug use: Not on file   • Sexual activity: Not on file     Other Topics Concern   • Not on file     Social History Narrative       Family History   Problem Relation Age of Onset   • Colon cancer Neg Hx    • Colon polyps Neg Hx        Vitals:    17 0841   BP: 100/64   Pulse: 84   Temp: 97.5 °F (36.4 °C)   SpO2: 99%   Weight: 62.1 kg (137 lb)   Height: 152.4 cm (60\")       Review of Systems:    General:    Present -feeling well   Skin:    Not Present-Rash   HEENT:     Not Present-Acute visual changes or Acute hearing changes   Neck :    Not Present- swollen glands   Genitourinary:      Not Present- burning, frequency, urgency hematuria, dysuria,   Cardiovascular:   Not Present-chest pain, palpitations, or pressure   Respiratory:   Not Present- shortness of breath or cough "   Gastrointestinal:  Musculoskeletal:  Neurological:  Psychiatric:   Present as mentioned in the HP    Not Present. Recent gait disturbances.    Not Present-Seizures and weakness in extremities.    Not Present- Anxiety or Depression.       Physical Exam:    General Appearance:    Alert, cooperative, in no acute distress   Psych:    Mood appropriate    Eyes:          conjunctivae and sclerae normal, no   icterus, no pallor   ENMT:    Ears appear intact with no abnormalities noted oral mucosa moist   Neck:   No adenopathy, supple, trachea midline, no thyromegaly, no   carotid bruit, no JVD    Cardiovascular:    Regular rhythm and normal rate, normal S1 and S2, no            murmur, no gallop, no rub, no click   Gastrointestinal:     Inspection normal.  Normal bowel sounds, no masses, no organomegaly, soft round non-tender, non-distended, no guarding, no rebound or tenderness. No hepatosplenomegaly.   Skin:   No bleeding, bruising or rash   Neurologic:   nonfocal       Lab Results   Component Value Date    WBC 6.63 11/17/2017    WBC 6.25 11/16/2017    WBC 10.96 (H) 11/15/2017    HGB 9.4 (L) 11/17/2017    HGB 10.8 (L) 11/16/2017    HGB 12.0 11/15/2017    HCT 29.2 (L) 11/17/2017    HCT 34.3 (L) 11/16/2017    HCT 37.6 11/15/2017     11/17/2017     11/16/2017     11/15/2017        Lab Results   Component Value Date     11/17/2017     11/16/2017     11/14/2017    K 3.9 11/17/2017    K 4.1 11/16/2017    K 4.1 11/14/2017     11/17/2017     11/16/2017     11/14/2017    CO2 28.0 11/17/2017    CO2 29.0 11/16/2017    CO2 25.0 11/14/2017    BUN 10 11/17/2017    BUN 10 11/16/2017    BUN 17 11/14/2017    CREATININE 0.67 11/17/2017    CREATININE 0.67 11/16/2017    CREATININE 0.73 11/14/2017    BILITOT 0.4 11/14/2017    BILITOT 0.2 08/11/2014    ALKPHOS 108 11/14/2017    ALKPHOS 86 08/11/2014    ALT 37 11/14/2017    ALT 26 08/11/2014    AST 34 11/14/2017    AST 30 08/11/2014     GLUCOSE 94 11/17/2017    GLUCOSE 98 11/16/2017    GLUCOSE 112 (H) 11/14/2017       No results found for: INR    Assessment and Plan:    Dhara was seen today for follow-up.    Diagnoses and all orders for this visit:    Crohn's disease of small intestine with intestinal obstruction  An acute panel for hepatitis A, B, C were found to be negative.  WBC was 6.63.  CMP was unremarkable.  A T spot was found to be negative.  The patient has a follow-up appointment with Dr. Solis on 1/22/18 to discuss possible Biologics.  Continue on current medications until her next appointment.  Other complete intestinal obstruction  Resolved  Non-intractable vomiting with nausea, unspecified vomiting type  Resolved    Patient Instructions   Crohn Disease  Crohn disease is a long-lasting (chronic) disease that affects your gastrointestinal (GI) tract. It often causes irritation and swelling (inflammation) in your small intestine and the beginning of your large intestine. However, it can affect any part of your GI tract. Crohn disease is part of a group of illnesses that are known as inflammatory bowel disease (IBD).  Crohn disease may start slowly and get worse over time. Symptoms may come and go. They may also disappear for months or even years at a time (remission).  CAUSES  The exact cause of Crohn disease is not known. It may be a response that causes your body's defense system (immune system) to mistakenly attack healthy cells and tissues (autoimmune response). Your genes and your environment may also play a role.  RISK FACTORS  You may be at greater risk for Crohn disease if you:  · Have other family members with Crohn disease or another IBD.  · Use any tobacco products, including cigarettes, chewing tobacco, or electronic cigarettes.  · Are in your 20s.  · Have Eastern  ancestry.  SIGNS AND SYMPTOMS  The main signs and symptoms of Crohn disease involve your GI tract. These include:  · Diarrhea.  · Rectal bleeding.  · An  urgent need to move your bowels.  · The feeling that you are not finished having a bowel movement.  · Abdominal pain or cramping.  · Constipation.  General signs and symptoms of Crohn disease may also include:  · Unexplained weight loss.  · Fatigue.  · Fever.  · Nausea.  · Loss of appetite.  · Joint pain  · Changes in vision.  · Red bumps on your skin.  DIAGNOSIS  Your health care provider may suspect Crohn disease based on your symptoms and your medical history. Your health care provider will do a physical exam. You may need to see a health care provider who specializes in diseases of the digestive tract (gastroenterologist). You may also have tests to help your health care providers make a diagnosis. These may include:  · Blood tests.  · Stool sample tests.  · Imaging tests, such as X-rays and CT scans.  · Tests to examine the inside of your intestines using a long, flexible tube that has a light and a camera on the end (endoscopy or colonoscopy).  · A procedure to take tissue samples from inside your bowel (biopsy) to be examined under a microscope.  TREATMENT   There is no cure for Crohn disease. Treatment will focus on managing your symptoms. Crohn disease affects each person differently. Your treatment may include:  · Resting your bowels. Drinking only clear liquids or getting nutrition through an IV for a period of time gives your bowels a chance to heal because they are not passing stools.  · Medicines. These may be used alone or in combination (combination therapy). These may include antibiotic medicines. You may be given medicines that help to:    Reduce inflammation.    Control your immune system activity.    Fight infections.    Relieve cramps and prevent diarrhea.    Control your pain.  · Surgery. You may need surgery if:    Medicines and other treatments are no longer working.    You develop complications from severe Crohn disease.    A section of your intestine becomes so damaged that it needs to be  removed.  HOME CARE INSTRUCTIONS  · Take medicines only as directed by your health care provider.  · If you were prescribed an antibiotic medicine, finish it all even if you start to feel better.  · Keep all follow-up visits as directed by your health care provider. This is important.  · Talk with your health care provider about changing your diet. This may help your symptoms. Your health care provide may recommend changes, such as:    Drinking more fluids.    Avoiding milk and other foods that contain lactose.    Eating a low-fat diet.    Avoiding high-fiber foods, such as popcorn and nuts.    Avoiding carbonated beverages, such as soda.    Eating smaller meals more often rather than eating large meals.    Keeping a food diary to identify foods that make your symptoms better or worse.  · Do not use any tobacco products, including cigarettes, chewing tobacco, or electronic cigarettes. If you need help quitting, ask your health care provider.  · Limit alcohol intake to no more than 1 drink per day for nonpregnant women and 2 drinks per day for men. One drink equals 12 ounces of beer, 5 ounces of wine, or 1½ ounces of hard liquor.  · Exercise daily or as directed by your health care provider.  SEEK MEDICAL CARE IF:  · You have diarrhea, abdominal cramps, and other gastrointestinal problems that are present almost all of the time.  · Your symptoms do not improve with treatment.  · You continue to lose weight.  · You develop a rash or sores on your skin.  · You develop eye problems.  · You have a fever.    · Your symptoms get worse.  · You develop new symptoms.  SEEK IMMEDIATE MEDICAL CARE IF:  · You have bloody diarrhea.  · You develop severe abdominal pain.  · You cannot pass stools.     This information is not intended to replace advice given to you by your health care provider. Make sure you discuss any questions you have with your health care provider.     Document Released: 09/27/2006 Document Revised: 01/08/2016  Document Reviewed: 08/05/2015  [employer] Patient Education ©2017 [employer]        Next follow-up appointment      EMR Dragon/Transcription disclaimer:  Much of this encounter note is an electronic transcription/translation of spoken language to printed text. The electronic translation of spoken language may permit erroneous, or at times, nonsensical words or phrases to be inadvertently transcribed; although I have reviewed the note for such errors, some may still exist.

## 2017-12-12 NOTE — PATIENT INSTRUCTIONS
Crohn Disease  Crohn disease is a long-lasting (chronic) disease that affects your gastrointestinal (GI) tract. It often causes irritation and swelling (inflammation) in your small intestine and the beginning of your large intestine. However, it can affect any part of your GI tract. Crohn disease is part of a group of illnesses that are known as inflammatory bowel disease (IBD).  Crohn disease may start slowly and get worse over time. Symptoms may come and go. They may also disappear for months or even years at a time (remission).  CAUSES  The exact cause of Crohn disease is not known. It may be a response that causes your body's defense system (immune system) to mistakenly attack healthy cells and tissues (autoimmune response). Your genes and your environment may also play a role.  RISK FACTORS  You may be at greater risk for Crohn disease if you:  · Have other family members with Crohn disease or another IBD.  · Use any tobacco products, including cigarettes, chewing tobacco, or electronic cigarettes.  · Are in your 20s.  · Have Eastern  ancestry.  SIGNS AND SYMPTOMS  The main signs and symptoms of Crohn disease involve your GI tract. These include:  · Diarrhea.  · Rectal bleeding.  · An urgent need to move your bowels.  · The feeling that you are not finished having a bowel movement.  · Abdominal pain or cramping.  · Constipation.  General signs and symptoms of Crohn disease may also include:  · Unexplained weight loss.  · Fatigue.  · Fever.  · Nausea.  · Loss of appetite.  · Joint pain  · Changes in vision.  · Red bumps on your skin.  DIAGNOSIS  Your health care provider may suspect Crohn disease based on your symptoms and your medical history. Your health care provider will do a physical exam. You may need to see a health care provider who specializes in diseases of the digestive tract (gastroenterologist). You may also have tests to help your health care providers make a diagnosis. These may  include:  · Blood tests.  · Stool sample tests.  · Imaging tests, such as X-rays and CT scans.  · Tests to examine the inside of your intestines using a long, flexible tube that has a light and a camera on the end (endoscopy or colonoscopy).  · A procedure to take tissue samples from inside your bowel (biopsy) to be examined under a microscope.  TREATMENT   There is no cure for Crohn disease. Treatment will focus on managing your symptoms. Crohn disease affects each person differently. Your treatment may include:  · Resting your bowels. Drinking only clear liquids or getting nutrition through an IV for a period of time gives your bowels a chance to heal because they are not passing stools.  · Medicines. These may be used alone or in combination (combination therapy). These may include antibiotic medicines. You may be given medicines that help to:    Reduce inflammation.    Control your immune system activity.    Fight infections.    Relieve cramps and prevent diarrhea.    Control your pain.  · Surgery. You may need surgery if:    Medicines and other treatments are no longer working.    You develop complications from severe Crohn disease.    A section of your intestine becomes so damaged that it needs to be removed.  HOME CARE INSTRUCTIONS  · Take medicines only as directed by your health care provider.  · If you were prescribed an antibiotic medicine, finish it all even if you start to feel better.  · Keep all follow-up visits as directed by your health care provider. This is important.  · Talk with your health care provider about changing your diet. This may help your symptoms. Your health care provide may recommend changes, such as:    Drinking more fluids.    Avoiding milk and other foods that contain lactose.    Eating a low-fat diet.    Avoiding high-fiber foods, such as popcorn and nuts.    Avoiding carbonated beverages, such as soda.    Eating smaller meals more often rather than eating large meals.     Keeping a food diary to identify foods that make your symptoms better or worse.  · Do not use any tobacco products, including cigarettes, chewing tobacco, or electronic cigarettes. If you need help quitting, ask your health care provider.  · Limit alcohol intake to no more than 1 drink per day for nonpregnant women and 2 drinks per day for men. One drink equals 12 ounces of beer, 5 ounces of wine, or 1½ ounces of hard liquor.  · Exercise daily or as directed by your health care provider.  SEEK MEDICAL CARE IF:  · You have diarrhea, abdominal cramps, and other gastrointestinal problems that are present almost all of the time.  · Your symptoms do not improve with treatment.  · You continue to lose weight.  · You develop a rash or sores on your skin.  · You develop eye problems.  · You have a fever.    · Your symptoms get worse.  · You develop new symptoms.  SEEK IMMEDIATE MEDICAL CARE IF:  · You have bloody diarrhea.  · You develop severe abdominal pain.  · You cannot pass stools.     This information is not intended to replace advice given to you by your health care provider. Make sure you discuss any questions you have with your health care provider.     Document Released: 09/27/2006 Document Revised: 01/08/2016 Document Reviewed: 08/05/2015  [employer] Interactive Patient Education ©2017 [employer]

## 2017-12-14 ENCOUNTER — TELEPHONE (OUTPATIENT)
Dept: GASTROENTEROLOGY | Facility: CLINIC | Age: 32
End: 2017-12-14

## 2017-12-14 NOTE — TELEPHONE ENCOUNTER
Patient called stating that she has a ear infection and was started on ciprofloxacin 500 mg. She wanted to make sure she was ok to take it with her crohn's.

## 2017-12-14 NOTE — TELEPHONE ENCOUNTER
Yes.  Of course if she had any type of allergies or reaction to this she would need to discontinue it and call her family physician.

## 2017-12-19 ENCOUNTER — APPOINTMENT (OUTPATIENT)
Dept: CT IMAGING | Facility: HOSPITAL | Age: 32
End: 2017-12-19

## 2017-12-19 ENCOUNTER — TELEPHONE (OUTPATIENT)
Dept: GASTROENTEROLOGY | Facility: CLINIC | Age: 32
End: 2017-12-19

## 2017-12-19 ENCOUNTER — HOSPITAL ENCOUNTER (INPATIENT)
Facility: HOSPITAL | Age: 32
LOS: 2 days | Discharge: HOME | End: 2017-12-21
Attending: EMERGENCY MEDICINE | Admitting: FAMILY MEDICINE

## 2017-12-19 DIAGNOSIS — K50.019 CROHN'S DISEASE OF SMALL INTESTINE WITH COMPLICATION (HCC): Primary | ICD-10-CM

## 2017-12-19 DIAGNOSIS — K52.9 ENTERITIS: ICD-10-CM

## 2017-12-19 DIAGNOSIS — R65.10 SIRS (SYSTEMIC INFLAMMATORY RESPONSE SYNDROME) (HCC): ICD-10-CM

## 2017-12-19 DIAGNOSIS — I95.9 HYPOTENSION, UNSPECIFIED HYPOTENSION TYPE: ICD-10-CM

## 2017-12-19 LAB
ALBUMIN SERPL-MCNC: 4.6 G/DL (ref 3.5–5)
ALBUMIN/GLOB SERPL: 1.4 G/DL (ref 1.1–2.5)
ALP SERPL-CCNC: 122 U/L (ref 24–120)
ALT SERPL W P-5'-P-CCNC: 34 U/L (ref 0–54)
ANION GAP SERPL CALCULATED.3IONS-SCNC: 13 MMOL/L (ref 4–13)
AST SERPL-CCNC: 25 U/L (ref 7–45)
BASOPHILS # BLD AUTO: 0.02 10*3/MM3 (ref 0–0.2)
BASOPHILS NFR BLD AUTO: 0.1 % (ref 0–2)
BILIRUB SERPL-MCNC: 0.3 MG/DL (ref 0.1–1)
BUN BLD-MCNC: 10 MG/DL (ref 5–21)
BUN/CREAT SERPL: 14.5 (ref 7–25)
CALCIUM SPEC-SCNC: 9.4 MG/DL (ref 8.4–10.4)
CHLORIDE SERPL-SCNC: 102 MMOL/L (ref 98–110)
CO2 SERPL-SCNC: 25 MMOL/L (ref 24–31)
CREAT BLD-MCNC: 0.69 MG/DL (ref 0.5–1.4)
CRP SERPL-MCNC: 1.25 MG/DL (ref 0–0.99)
D-LACTATE SERPL-SCNC: 1.2 MMOL/L (ref 0.5–2)
DEPRECATED RDW RBC AUTO: 47.8 FL (ref 40–54)
EOSINOPHIL # BLD AUTO: 0.06 10*3/MM3 (ref 0–0.7)
EOSINOPHIL NFR BLD AUTO: 0.3 % (ref 0–4)
ERYTHROCYTE [DISTWIDTH] IN BLOOD BY AUTOMATED COUNT: 14.7 % (ref 12–15)
GFR SERPL CREATININE-BSD FRML MDRD: 97 ML/MIN/1.73
GLOBULIN UR ELPH-MCNC: 3.2 GM/DL
GLUCOSE BLD-MCNC: 102 MG/DL (ref 70–100)
HCG SERPL QL: NEGATIVE
HCT VFR BLD AUTO: 41.8 % (ref 37–47)
HGB BLD-MCNC: 13.4 G/DL (ref 12–16)
HOLD SPECIMEN: NORMAL
HOLD SPECIMEN: NORMAL
IMM GRANULOCYTES # BLD: 0.07 10*3/MM3 (ref 0–0.03)
IMM GRANULOCYTES NFR BLD: 0.4 % (ref 0–5)
LYMPHOCYTES # BLD AUTO: 1.18 10*3/MM3 (ref 0.72–4.86)
LYMPHOCYTES NFR BLD AUTO: 6.2 % (ref 15–45)
MCH RBC QN AUTO: 28.9 PG (ref 28–32)
MCHC RBC AUTO-ENTMCNC: 32.1 G/DL (ref 33–36)
MCV RBC AUTO: 90.1 FL (ref 82–98)
MONOCYTES # BLD AUTO: 0.9 10*3/MM3 (ref 0.19–1.3)
MONOCYTES NFR BLD AUTO: 4.7 % (ref 4–12)
NEUTROPHILS # BLD AUTO: 16.72 10*3/MM3 (ref 1.87–8.4)
NEUTROPHILS NFR BLD AUTO: 88.3 % (ref 39–78)
NRBC BLD MANUAL-RTO: 0 /100 WBC (ref 0–0)
PLATELET # BLD AUTO: 435 10*3/MM3 (ref 130–400)
PMV BLD AUTO: 10 FL (ref 6–12)
POTASSIUM BLD-SCNC: 3.9 MMOL/L (ref 3.5–5.3)
PROCALCITONIN SERPL-MCNC: <0.25 NG/ML
PROT SERPL-MCNC: 7.8 G/DL (ref 6.3–8.7)
RBC # BLD AUTO: 4.64 10*6/MM3 (ref 4.2–5.4)
SODIUM BLD-SCNC: 140 MMOL/L (ref 135–145)
WBC NRBC COR # BLD: 18.95 10*3/MM3 (ref 4.8–10.8)
WHOLE BLOOD HOLD SPECIMEN: NORMAL
WHOLE BLOOD HOLD SPECIMEN: NORMAL

## 2017-12-19 PROCEDURE — 80053 COMPREHEN METABOLIC PANEL: CPT | Performed by: EMERGENCY MEDICINE

## 2017-12-19 PROCEDURE — 25010000002 FENTANYL CITRATE (PF) 100 MCG/2ML SOLUTION: Performed by: EMERGENCY MEDICINE

## 2017-12-19 PROCEDURE — 74177 CT ABD & PELVIS W/CONTRAST: CPT

## 2017-12-19 PROCEDURE — 99285 EMERGENCY DEPT VISIT HI MDM: CPT

## 2017-12-19 PROCEDURE — 25010000002 MORPHINE SULFATE (PF) 2 MG/ML SOLUTION: Performed by: FAMILY MEDICINE

## 2017-12-19 PROCEDURE — 84145 PROCALCITONIN (PCT): CPT | Performed by: EMERGENCY MEDICINE

## 2017-12-19 PROCEDURE — 25010000002 DEXAMETHASONE PER 1 MG: Performed by: EMERGENCY MEDICINE

## 2017-12-19 PROCEDURE — 85025 COMPLETE CBC W/AUTO DIFF WBC: CPT | Performed by: EMERGENCY MEDICINE

## 2017-12-19 PROCEDURE — 87040 BLOOD CULTURE FOR BACTERIA: CPT | Performed by: FAMILY MEDICINE

## 2017-12-19 PROCEDURE — 86140 C-REACTIVE PROTEIN: CPT | Performed by: EMERGENCY MEDICINE

## 2017-12-19 PROCEDURE — 83605 ASSAY OF LACTIC ACID: CPT | Performed by: EMERGENCY MEDICINE

## 2017-12-19 PROCEDURE — 25010000002 LEVOFLOXACIN PER 250 MG: Performed by: NURSE PRACTITIONER

## 2017-12-19 PROCEDURE — 87040 BLOOD CULTURE FOR BACTERIA: CPT | Performed by: EMERGENCY MEDICINE

## 2017-12-19 PROCEDURE — 25010000002 ONDANSETRON PER 1 MG: Performed by: EMERGENCY MEDICINE

## 2017-12-19 PROCEDURE — 25010000002 METHYLPREDNISOLONE PER 125 MG: Performed by: NURSE PRACTITIONER

## 2017-12-19 PROCEDURE — 0 IOPAMIDOL 61 % SOLUTION: Performed by: EMERGENCY MEDICINE

## 2017-12-19 PROCEDURE — 25010000002 HYDROMORPHONE PER 4 MG: Performed by: EMERGENCY MEDICINE

## 2017-12-19 PROCEDURE — 25010000002 ONDANSETRON PER 1 MG: Performed by: NURSE PRACTITIONER

## 2017-12-19 PROCEDURE — 84703 CHORIONIC GONADOTROPIN ASSAY: CPT | Performed by: EMERGENCY MEDICINE

## 2017-12-19 RX ORDER — NALOXONE HCL 0.4 MG/ML
0.4 VIAL (ML) INJECTION
Status: DISCONTINUED | OUTPATIENT
Start: 2017-12-19 | End: 2017-12-21 | Stop reason: HOSPADM

## 2017-12-19 RX ORDER — ONDANSETRON 4 MG/1
4 TABLET, ORALLY DISINTEGRATING ORAL EVERY 6 HOURS PRN
Status: DISCONTINUED | OUTPATIENT
Start: 2017-12-19 | End: 2017-12-21 | Stop reason: HOSPADM

## 2017-12-19 RX ORDER — LEVOFLOXACIN 5 MG/ML
750 INJECTION, SOLUTION INTRAVENOUS EVERY 24 HOURS
Status: DISCONTINUED | OUTPATIENT
Start: 2017-12-19 | End: 2017-12-20

## 2017-12-19 RX ORDER — METHYLPREDNISOLONE SODIUM SUCCINATE 125 MG/2ML
60 INJECTION, POWDER, LYOPHILIZED, FOR SOLUTION INTRAMUSCULAR; INTRAVENOUS EVERY 6 HOURS
Status: DISCONTINUED | OUTPATIENT
Start: 2017-12-19 | End: 2017-12-21 | Stop reason: HOSPADM

## 2017-12-19 RX ORDER — MORPHINE SULFATE 2 MG/ML
2 INJECTION, SOLUTION INTRAMUSCULAR; INTRAVENOUS EVERY 4 HOURS PRN
Status: DISCONTINUED | OUTPATIENT
Start: 2017-12-19 | End: 2017-12-21 | Stop reason: HOSPADM

## 2017-12-19 RX ORDER — ONDANSETRON 2 MG/ML
4 INJECTION INTRAMUSCULAR; INTRAVENOUS ONCE
Status: COMPLETED | OUTPATIENT
Start: 2017-12-19 | End: 2017-12-19

## 2017-12-19 RX ORDER — FENTANYL CITRATE 50 UG/ML
25 INJECTION, SOLUTION INTRAMUSCULAR; INTRAVENOUS ONCE
Status: COMPLETED | OUTPATIENT
Start: 2017-12-19 | End: 2017-12-19

## 2017-12-19 RX ORDER — HYDROMORPHONE HCL 110MG/55ML
0.5 PATIENT CONTROLLED ANALGESIA SYRINGE INTRAVENOUS
Status: DISCONTINUED | OUTPATIENT
Start: 2017-12-19 | End: 2017-12-21 | Stop reason: HOSPADM

## 2017-12-19 RX ORDER — SODIUM CHLORIDE 9 MG/ML
30 INJECTION, SOLUTION INTRAVENOUS CONTINUOUS
Status: DISCONTINUED | OUTPATIENT
Start: 2017-12-19 | End: 2017-12-21 | Stop reason: HOSPADM

## 2017-12-19 RX ORDER — MULTIPLE VITAMINS W/ MINERALS TAB 9MG-400MCG
1 TAB ORAL NIGHTLY
COMMUNITY
End: 2020-09-20

## 2017-12-19 RX ORDER — ONDANSETRON 2 MG/ML
4 INJECTION INTRAMUSCULAR; INTRAVENOUS EVERY 6 HOURS PRN
Status: DISCONTINUED | OUTPATIENT
Start: 2017-12-19 | End: 2017-12-21 | Stop reason: HOSPADM

## 2017-12-19 RX ORDER — DEXAMETHASONE SODIUM PHOSPHATE 10 MG/ML
10 INJECTION INTRAMUSCULAR; INTRAVENOUS ONCE
Status: COMPLETED | OUTPATIENT
Start: 2017-12-19 | End: 2017-12-19

## 2017-12-19 RX ORDER — ONDANSETRON 4 MG/1
4 TABLET, FILM COATED ORAL EVERY 6 HOURS PRN
Status: DISCONTINUED | OUTPATIENT
Start: 2017-12-19 | End: 2017-12-21 | Stop reason: HOSPADM

## 2017-12-19 RX ORDER — CIPROFLOXACIN 500 MG/1
500 TABLET, FILM COATED ORAL 2 TIMES DAILY
COMMUNITY
End: 2017-12-21 | Stop reason: HOSPADM

## 2017-12-19 RX ORDER — SODIUM CHLORIDE 0.9 % (FLUSH) 0.9 %
1-10 SYRINGE (ML) INJECTION AS NEEDED
Status: DISCONTINUED | OUTPATIENT
Start: 2017-12-19 | End: 2017-12-21 | Stop reason: HOSPADM

## 2017-12-19 RX ADMIN — SODIUM CHLORIDE 1851 ML: 9 INJECTION, SOLUTION INTRAVENOUS at 16:50

## 2017-12-19 RX ADMIN — SODIUM CHLORIDE 100 ML/HR: 9 INJECTION, SOLUTION INTRAVENOUS at 20:36

## 2017-12-19 RX ADMIN — METRONIDAZOLE 500 MG: 500 INJECTION, SOLUTION INTRAVENOUS at 22:46

## 2017-12-19 RX ADMIN — IOPAMIDOL 100 ML: 612 INJECTION, SOLUTION INTRAVENOUS at 17:22

## 2017-12-19 RX ADMIN — ONDANSETRON 4 MG: 2 INJECTION, SOLUTION INTRAMUSCULAR; INTRAVENOUS at 21:01

## 2017-12-19 RX ADMIN — ONDANSETRON 4 MG: 2 INJECTION, SOLUTION INTRAMUSCULAR; INTRAVENOUS at 14:52

## 2017-12-19 RX ADMIN — LEVOFLOXACIN 750 MG: 5 INJECTION, SOLUTION INTRAVENOUS at 20:50

## 2017-12-19 RX ADMIN — HYDROMORPHONE HYDROCHLORIDE 1 MG: 1 INJECTION, SOLUTION INTRAMUSCULAR; INTRAVENOUS; SUBCUTANEOUS at 14:51

## 2017-12-19 RX ADMIN — METHYLPREDNISOLONE SODIUM SUCCINATE 60 MG: 125 INJECTION, POWDER, FOR SOLUTION INTRAMUSCULAR; INTRAVENOUS at 21:00

## 2017-12-19 RX ADMIN — FENTANYL CITRATE 25 MCG: 50 INJECTION, SOLUTION INTRAMUSCULAR; INTRAVENOUS at 16:48

## 2017-12-19 RX ADMIN — DEXAMETHASONE SODIUM PHOSPHATE 10 MG: 10 INJECTION, SOLUTION INTRAMUSCULAR; INTRAVENOUS at 16:49

## 2017-12-19 RX ADMIN — HYDROMORPHONE HYDROCHLORIDE 1 MG: 1 INJECTION, SOLUTION INTRAMUSCULAR; INTRAVENOUS; SUBCUTANEOUS at 18:25

## 2017-12-19 RX ADMIN — ERTAPENEM SODIUM 1 G: 1 INJECTION, POWDER, LYOPHILIZED, FOR SOLUTION INTRAMUSCULAR; INTRAVENOUS at 18:30

## 2017-12-19 RX ADMIN — SODIUM CHLORIDE 1000 ML: 9 INJECTION, SOLUTION INTRAVENOUS at 14:46

## 2017-12-19 RX ADMIN — MORPHINE SULFATE 2 MG: 2 INJECTION, SOLUTION INTRAMUSCULAR; INTRAVENOUS at 21:01

## 2017-12-19 NOTE — ED NOTES
Pt vomiting. Dr Jordan notified. Oral contrast held for nausea medication to take effect.     Sofia Vasquez, RN  12/19/17 6097

## 2017-12-19 NOTE — TELEPHONE ENCOUNTER
Patient called stating that she started cramping about 2 am this morning and it is not bad enough she feels that she needs to go to the ER. She denies any fever, bleeding, or diarrhea. She does states that she may have constipation. She has had a few small BM but not much at all.

## 2017-12-19 NOTE — ED NOTES
Spoke with francisco in CT, cancel oral contrast. CT is to be done with IV contrast only.     Sofia Vasquez RN  12/19/17 8429

## 2017-12-19 NOTE — ED PROVIDER NOTES
Subjective   Patient is a 32 y.o. female presenting with abdominal pain.   Abdominal Pain   Pain location:  Generalized  Pain quality: aching and bloating    Pain quality: not dull, not heavy and no pressure    Pain radiates to:  Does not radiate  Pain severity:  Mild  Onset quality:  Gradual  Timing:  Constant  Progression:  Worsening  Context: not alcohol use, not diet changes, not eating, not laxative use, not medication withdrawal, not recent illness, not recent travel and not sick contacts    Relieved by:  Nothing  Worsened by:  Nothing  Ineffective treatments:  None tried  Associated symptoms: chills, diarrhea, nausea and vomiting    Associated symptoms: no anorexia, no belching, no constipation, no cough, no flatus, no hematemesis, no hematuria and no sore throat    Risk factors: no alcohol abuse, no aspirin use, has not had multiple surgeries and no NSAID use        Review of Systems   Constitutional: Positive for chills.   HENT: Negative.  Negative for sore throat.    Eyes: Negative.    Respiratory: Negative.  Negative for cough.    Cardiovascular: Negative.    Gastrointestinal: Positive for abdominal pain, diarrhea, nausea and vomiting. Negative for anorexia, constipation, flatus and hematemesis.   Endocrine: Negative.    Genitourinary: Negative.  Negative for hematuria.   Skin: Negative.    Neurological: Negative.    Hematological: Negative.    All other systems reviewed and are negative.      Past Medical History:   Diagnosis Date   • Crohn's disease        Allergies   Allergen Reactions   • Guaifenesin Er Hives       Past Surgical History:   Procedure Laterality Date   • ANAL FISTULA REPAIR     •  SECTION     • COLONOSCOPY  2016   • DILATATION AND CURETTAGE         Family History   Problem Relation Age of Onset   • Colon cancer Neg Hx    • Colon polyps Neg Hx        Social History     Social History   • Marital status:      Spouse name: N/A   • Number of children: N/A   • Years of  education: N/A     Social History Main Topics   • Smoking status: Never Smoker   • Smokeless tobacco: Never Used   • Alcohol use No   • Drug use: None   • Sexual activity: Not Asked     Other Topics Concern   • None     Social History Narrative           Objective   Physical Exam   Constitutional: She is oriented to person, place, and time. She appears well-developed and well-nourished.  Non-toxic appearance.   HENT:   Head: Normocephalic and atraumatic.   Mouth/Throat: Oropharynx is clear and moist.   Eyes: Conjunctivae are normal. Pupils are equal, round, and reactive to light.   Neck: Normal range of motion. Neck supple. No hepatojugular reflux and no JVD present.   Cardiovascular: Normal rate, regular rhythm, normal heart sounds and intact distal pulses.  PMI is not displaced.  Exam reveals no decreased pulses.    No murmur heard.  Pulmonary/Chest: Effort normal and breath sounds normal. No accessory muscle usage. No apnea. No respiratory distress. She has no decreased breath sounds. She has no wheezes.   Abdominal: Normal appearance, normal aorta and bowel sounds are normal. She exhibits no shifting dullness, no distension, no fluid wave, no abdominal bruit, no ascites, no pulsatile midline mass and no mass. There is tenderness. There is no guarding.   Musculoskeletal: Normal range of motion.   Neurological: She is alert and oriented to person, place, and time. She has normal strength and normal reflexes. No cranial nerve deficit. GCS eye subscore is 4. GCS verbal subscore is 5. GCS motor subscore is 6.   Skin: Skin is warm and dry.   Psychiatric: She has a normal mood and affect. Her behavior is normal.   Nursing note and vitals reviewed.      Procedures         ED Course  ED Course   Comment By Time   case discussed the patient is feeling better but still having abdominal pain she had Doppler blood pressure was given fluid boluses IV antibiotics CT shows inflammatory bowel disease will be admitted Neal  MD Nikki 12/19 1805                  [facility]    Final diagnoses:   Crohn's disease of small intestine with complication   Hypotension, unspecified hypotension type   Enteritis   SIRS (systemic inflammatory response syndrome)            Neal Jordan MD  12/19/17 2217

## 2017-12-20 LAB
ANION GAP SERPL CALCULATED.3IONS-SCNC: 11 MMOL/L (ref 4–13)
BASOPHILS # BLD AUTO: 0 10*3/MM3 (ref 0–0.2)
BASOPHILS NFR BLD AUTO: 0 % (ref 0–2)
BILIRUB UR QL STRIP: NEGATIVE
BUN BLD-MCNC: 10 MG/DL (ref 5–21)
BUN/CREAT SERPL: 19.6 (ref 7–25)
CALCIUM SPEC-SCNC: 7.8 MG/DL (ref 8.4–10.4)
CHLORIDE SERPL-SCNC: 106 MMOL/L (ref 98–110)
CLARITY UR: CLEAR
CO2 SERPL-SCNC: 22 MMOL/L (ref 24–31)
COLOR UR: YELLOW
CREAT BLD-MCNC: 0.51 MG/DL (ref 0.5–1.4)
DEPRECATED RDW RBC AUTO: 46.2 FL (ref 40–54)
EOSINOPHIL # BLD AUTO: 0 10*3/MM3 (ref 0–0.7)
EOSINOPHIL NFR BLD AUTO: 0 % (ref 0–4)
ERYTHROCYTE [DISTWIDTH] IN BLOOD BY AUTOMATED COUNT: 14.4 % (ref 12–15)
GFR SERPL CREATININE-BSD FRML MDRD: 138 ML/MIN/1.73
GLUCOSE BLD-MCNC: 119 MG/DL (ref 70–100)
GLUCOSE UR STRIP-MCNC: NEGATIVE MG/DL
HCT VFR BLD AUTO: 33.8 % (ref 37–47)
HGB BLD-MCNC: 10.9 G/DL (ref 12–16)
HGB UR QL STRIP.AUTO: NEGATIVE
IMM GRANULOCYTES # BLD: 0.08 10*3/MM3 (ref 0–0.03)
IMM GRANULOCYTES NFR BLD: 0.6 % (ref 0–5)
KETONES UR QL STRIP: NEGATIVE
LEUKOCYTE ESTERASE UR QL STRIP.AUTO: NEGATIVE
LYMPHOCYTES # BLD AUTO: 0.65 10*3/MM3 (ref 0.72–4.86)
LYMPHOCYTES NFR BLD AUTO: 4.9 % (ref 15–45)
MCH RBC QN AUTO: 28.3 PG (ref 28–32)
MCHC RBC AUTO-ENTMCNC: 32.2 G/DL (ref 33–36)
MCV RBC AUTO: 87.8 FL (ref 82–98)
MONOCYTES # BLD AUTO: 0.08 10*3/MM3 (ref 0.19–1.3)
MONOCYTES NFR BLD AUTO: 0.6 % (ref 4–12)
NEUTROPHILS # BLD AUTO: 12.36 10*3/MM3 (ref 1.87–8.4)
NEUTROPHILS NFR BLD AUTO: 93.9 % (ref 39–78)
NITRITE UR QL STRIP: NEGATIVE
NRBC BLD MANUAL-RTO: 0 /100 WBC (ref 0–0)
PH UR STRIP.AUTO: 7 [PH] (ref 5–8)
PLATELET # BLD AUTO: 397 10*3/MM3 (ref 130–400)
PMV BLD AUTO: 9.9 FL (ref 6–12)
POTASSIUM BLD-SCNC: 4.1 MMOL/L (ref 3.5–5.3)
PROT UR QL STRIP: NEGATIVE
RBC # BLD AUTO: 3.85 10*6/MM3 (ref 4.2–5.4)
SODIUM BLD-SCNC: 139 MMOL/L (ref 135–145)
SP GR UR STRIP: 1.01 (ref 1–1.03)
UROBILINOGEN UR QL STRIP: NORMAL
WBC NRBC COR # BLD: 13.17 10*3/MM3 (ref 4.8–10.8)

## 2017-12-20 PROCEDURE — 25010000002 HYDROMORPHONE PER 4 MG: Performed by: NURSE PRACTITIONER

## 2017-12-20 PROCEDURE — 85025 COMPLETE CBC W/AUTO DIFF WBC: CPT | Performed by: NURSE PRACTITIONER

## 2017-12-20 PROCEDURE — 81003 URINALYSIS AUTO W/O SCOPE: CPT | Performed by: NURSE PRACTITIONER

## 2017-12-20 PROCEDURE — 25010000002 ONDANSETRON PER 1 MG: Performed by: NURSE PRACTITIONER

## 2017-12-20 PROCEDURE — 80048 BASIC METABOLIC PNL TOTAL CA: CPT | Performed by: NURSE PRACTITIONER

## 2017-12-20 PROCEDURE — 25010000002 METHYLPREDNISOLONE PER 125 MG: Performed by: NURSE PRACTITIONER

## 2017-12-20 RX ORDER — LEVOFLOXACIN 500 MG/1
500 TABLET, FILM COATED ORAL EVERY 24 HOURS
Status: DISCONTINUED | OUTPATIENT
Start: 2017-12-20 | End: 2017-12-21 | Stop reason: HOSPADM

## 2017-12-20 RX ORDER — METRONIDAZOLE 500 MG/1
500 TABLET ORAL EVERY 8 HOURS SCHEDULED
Status: DISCONTINUED | OUTPATIENT
Start: 2017-12-20 | End: 2017-12-21 | Stop reason: HOSPADM

## 2017-12-20 RX ADMIN — METHYLPREDNISOLONE SODIUM SUCCINATE 60 MG: 125 INJECTION, POWDER, FOR SOLUTION INTRAMUSCULAR; INTRAVENOUS at 20:25

## 2017-12-20 RX ADMIN — METHYLPREDNISOLONE SODIUM SUCCINATE 60 MG: 125 INJECTION, POWDER, FOR SOLUTION INTRAMUSCULAR; INTRAVENOUS at 03:41

## 2017-12-20 RX ADMIN — METRONIDAZOLE 500 MG: 500 INJECTION, SOLUTION INTRAVENOUS at 13:35

## 2017-12-20 RX ADMIN — LEVOFLOXACIN 500 MG: 500 TABLET, FILM COATED ORAL at 20:07

## 2017-12-20 RX ADMIN — METHYLPREDNISOLONE SODIUM SUCCINATE 60 MG: 125 INJECTION, POWDER, FOR SOLUTION INTRAMUSCULAR; INTRAVENOUS at 09:26

## 2017-12-20 RX ADMIN — HYDROMORPHONE HYDROCHLORIDE 0.5 MG: 2 INJECTION INTRAMUSCULAR; INTRAVENOUS; SUBCUTANEOUS at 12:09

## 2017-12-20 RX ADMIN — METRONIDAZOLE 500 MG: 500 INJECTION, SOLUTION INTRAVENOUS at 05:36

## 2017-12-20 RX ADMIN — METHYLPREDNISOLONE SODIUM SUCCINATE 60 MG: 125 INJECTION, POWDER, FOR SOLUTION INTRAMUSCULAR; INTRAVENOUS at 13:35

## 2017-12-20 RX ADMIN — HYDROMORPHONE HYDROCHLORIDE 0.5 MG: 2 INJECTION INTRAMUSCULAR; INTRAVENOUS; SUBCUTANEOUS at 20:22

## 2017-12-20 RX ADMIN — HYDROMORPHONE HYDROCHLORIDE 0.5 MG: 2 INJECTION INTRAMUSCULAR; INTRAVENOUS; SUBCUTANEOUS at 05:35

## 2017-12-20 RX ADMIN — ONDANSETRON 4 MG: 2 INJECTION, SOLUTION INTRAMUSCULAR; INTRAVENOUS at 03:44

## 2017-12-20 RX ADMIN — SODIUM CHLORIDE 100 ML/HR: 9 INJECTION, SOLUTION INTRAVENOUS at 09:31

## 2017-12-20 RX ADMIN — METRONIDAZOLE 500 MG: 500 TABLET ORAL at 21:25

## 2017-12-20 NOTE — PROGRESS NOTES
[facility] Medicine Services  INPATIENT PROGRESS NOTE    Length of Stay: 1  Date of Admission: 12/19/2017  Primary Care Physician: Alfred Lawrence MD    Subjective   Chief Complaint: Abdominal pain  HPI   Doing well.  Abdominal pain better today. BP better today.    WBC's down significantly after abx.          Review of Systems   Constitutional: Negative for fatigue and fever.   HENT: Negative for congestion and ear pain.    Eyes: Negative for pain and visual disturbance.   Respiratory: Negative for cough, shortness of breath and wheezing.    Cardiovascular: Negative for chest pain and palpitations.   Gastrointestinal: Positive for abdominal pain. Negative for diarrhea, nausea and vomiting.   Endocrine: Negative for heat intolerance.   Genitourinary: Negative for dysuria and frequency.   Musculoskeletal: Negative for arthralgias and back pain.   Skin: Negative for rash and wound.   Neurological: Negative for dizziness and light-headedness.   Psychiatric/Behavioral: Negative for confusion. The patient is not nervous/anxious.    All other systems reviewed and are negative.     All pertinent negatives and positives are as above. All other systems have been reviewed and are negative unless otherwise stated.     Objective    Temp:  [97.5 °F (36.4 °C)-98.3 °F (36.8 °C)] 97.5 °F (36.4 °C)  Heart Rate:  [72-86] 86  Resp:  [14-16] 16  BP: ()/(52-84) 102/53  Physical Exam   Constitutional: She is oriented to person, place, and time. She appears well-developed and well-nourished.   HENT:   Head: Normocephalic and atraumatic.   Right Ear: External ear normal.   Left Ear: External ear normal.   Nose: Nose normal.   Mouth/Throat: Oropharynx is clear and moist.   Eyes: Conjunctivae and EOM are normal.   Neck: Normal range of motion. Neck supple.   Cardiovascular: Normal rate, regular rhythm and normal heart sounds.    Pulmonary/Chest: Effort normal and breath sounds normal.    Abdominal: Soft. Bowel sounds are normal. She exhibits no distension. There is no tenderness.   Musculoskeletal: Normal range of motion.   Neurological: She is alert and oriented to person, place, and time.   Skin: Skin is warm and dry.   Psychiatric: She has a normal mood and affect. Her speech is normal and behavior is normal. Cognition and memory are normal.         Results Review:  I have reviewed the labs, radiology results, and diagnostic studies.    Laboratory Data:     Results from last 7 days  Lab Units 12/20/17  0629 12/19/17  1445   WBC 10*3/mm3 13.17* 18.95*   HEMOGLOBIN g/dL 10.9* 13.4   HEMATOCRIT % 33.8* 41.8   PLATELETS 10*3/mm3 397 435*          Results from last 7 days  Lab Units 12/20/17  0629 12/19/17  1445   SODIUM mmol/L 139 140   POTASSIUM mmol/L 4.1 3.9   CHLORIDE mmol/L 106 102   CO2 mmol/L 22.0* 25.0   BUN mg/dL 10 10   CREATININE mg/dL 0.51 0.69   CALCIUM mg/dL 7.8* 9.4   BILIRUBIN mg/dL  --  0.3   ALK PHOS U/L  --  122*   ALT (SGPT) U/L  --  34   AST (SGOT) U/L  --  25   GLUCOSE mg/dL 119* 102*       Culture Data:   Blood Culture   Date Value Ref Range Status   12/19/2017 No growth at less than 24 hours  Preliminary   12/19/2017 No growth at less than 24 hours  Preliminary       Radiology Data:   Imaging Results (last 24 hours)     Procedure Component Value Units Date/Time    CT Abdomen Pelvis With Contrast [[id number]] Collected:  12/19/17 1735     Updated:  12/19/17 1741    Narrative:       EXAMINATION: CT ABDOMEN PELVIS W CONTRAST-      12/19/2017 5:20 PM CST     HISTORY: History of bowel obstruction. Abdominal pain.     In order to have a CT radiation dose as low as reasonably achievable  Automated Exposure Control was utilized for adjustment of the mA and/or  KV according to patient size.     DLP in mGycm= 287.     Axial, sagittal, and coronal CT imaging of the abdomen/pelvis with IV  contrast injection.     Comparison is made with 11/15/2017.     Recurrent distal ileum wall  thickening and luminal narrowing compatible  with inflammatory bowel disease. Proximal to this the small bowel is  diffusely dilated and fluid-filled representing a functional  obstruction. There is no hernia.  No obstructing mass.     Mild mesenteric edema around the enhancing narrow distal small bowel.     There is a small amount of free pelvic fluid.     No free air is seen.     Heart size is normal.  The lung bases are clear.     Normal liver, gallbladder, pancreas, spleen, adrenal glands, and  kidneys.     Summary:  1. Distal ileum wall thickening with enhancement and inflammation  compatible with inflammatory bowel disease.  2. Dilated fluid-filled small bowel proximal to the distal ileum  representing a functional obstruction. There is associated mesenteric  inflammation and a small amount of free pelvic fluid.  3. No free air to indicate perforation.                                   This report was finalized on 12/19/2017 17:38 by Dr. Cortez Bernardo MD.          I have reviewed the patient current medications.     Assessment/Plan     Hospital Problem List     Crohn's disease of small intestine    Overview Signed 11/26/2017 11:41 AM by Zehra Solis MD     T-spot neg 11/2017               1.  Severe Sepsis   -IVF  -IV abx last night, change to PO today    2.  Crohn's flare with possible superimposed infection  -Levaquin/Flagyl    3.   Nausea  -PRN Zofran    4.  Anemia  -Likely iron deficiency secondary to Crohn's  -Monitor H&H              Discharge Planning: I expect the patient to be discharged to home in AM  Case Manzanares MD   12/20/17   3:48 PM

## 2017-12-20 NOTE — PLAN OF CARE
Problem: Patient Care Overview (Adult)  Goal: Plan of Care Review  Outcome: Ongoing (interventions implemented as appropriate)   12/20/17 0330   Coping/Psychosocial Response Interventions   Plan Of Care Reviewed With patient     Goal: Adult Individualization and Mutuality  Outcome: Ongoing (interventions implemented as appropriate)    Goal: Discharge Needs Assessment  Outcome: Ongoing (interventions implemented as appropriate)

## 2017-12-20 NOTE — PLAN OF CARE
Problem: Infection, Risk/Actual (Adult)  Goal: Identify Related Risk Factors and Signs and Symptoms  Outcome: Ongoing (interventions implemented as appropriate)   12/20/17 1327   Infection, Risk/Actual   Infection, Risk/Actual: Related Risk Factors chronic illness/condition   Signs and Symptoms (Infection, Risk/Actual) pain     Goal: Infection Prevention/Resolution  Outcome: Ongoing (interventions implemented as appropriate)   12/20/17 1327   Infection, Risk/Actual (Adult)   Infection Prevention/Resolution making progress toward outcome       Problem: Pain, Acute (Adult)  Goal: Acceptable Pain Control/Comfort Level  Outcome: Ongoing (interventions implemented as appropriate)   12/20/17 1327   Pain, Acute (Adult)   Acceptable Pain Control/Comfort Level making progress toward outcome       Problem: Patient Care Overview (Adult)  Goal: Plan of Care Review  Outcome: Ongoing (interventions implemented as appropriate)  Patient awake and alert. Pt c/o waxing and waning bilateral lower abd pain. PRN pain mediation given with good relief. RN encouraged increased activity. IVF decreased today. Continue IV steroids. Tolerating clear liquid diet. Pt denies blood in stool. Pt c/o left ear pain and stated she had an ear infection recently which she was prescribe and currently taking antibiotics for at home. Dr. Manzanares was notified on rounds and stated Levaquin should cover her ear infection.    12/20/17 1327   Coping/Psychosocial Response Interventions   Plan Of Care Reviewed With patient   Patient Care Overview   Progress improving     Goal: Adult Individualization and Mutuality  Outcome: Ongoing (interventions implemented as appropriate)   12/20/17 1327   Individualization   Patient Specific Goals Decreased pain. Infection resolved. Return of normal bowel habits.   Patient Specific Interventions PRN pain medication. IV abx. IVF. Clear liquid diet. encourage ambulation.     Goal: Discharge Needs Assessment  Outcome: Ongoing  (interventions implemented as appropriate)

## 2017-12-20 NOTE — H&P
[facility] Medicine Services  HISTORY AND PHYSICAL    Date of Admission: 2017  Primary Care Physician: Alfred Lawrence MD    Subjective     Chief Complaint: Abdominal pain/nausea    History of Present Illness  Dhara Carmona is a pleasant 32 year old  female with a history of Crohns.  Diagnosed in .  Patient states she did not start having trouble with it until earlier this year.  She is followed by Zehra Solis MD GI.  Currently she is not on active therapy.  She reports developing pain at approximately 2 am today.  Pain progressively worsened followed by nausea and dry heaving.  Currently pain is scored 8 on scale of 1/10.  Dilaudid helped but Fentanyl did not. No heaves since treatment given earlier.  Patient is admitted for further evaluation and treatment.     Review of Systems   Constitutional: Negative for activity change, appetite change, fatigue and fever.   HENT: Negative for congestion, mouth sores, rhinorrhea, sinus pressure and trouble swallowing.    Respiratory: Negative for cough, chest tightness, shortness of breath and wheezing.    Cardiovascular: Negative for chest pain, palpitations and leg swelling.   Gastrointestinal: Positive for abdominal pain, nausea and vomiting (dry heaving). Negative for abdominal distention, constipation and diarrhea.   Genitourinary: Negative for difficulty urinating, dysuria and frequency.   Musculoskeletal: Negative for arthralgias and myalgias.   Neurological: Negative for dizziness, weakness and light-headedness.   Psychiatric/Behavioral: Negative for agitation and sleep disturbance. The patient is not nervous/anxious.         Past Medical History:   Past Medical History:   Diagnosis Date   • Crohn's disease        Past Surgical History:   Past Surgical History:   Procedure Laterality Date   • ANAL FISTULA REPAIR     •  SECTION     • COLONOSCOPY  2016   • DILATATION AND CURETTAGE         Family  "History: family history is negative for Colon cancer and Colon polyps. Mother alive in good health.  Father alive with hypercholesteremia. Grand mother with pernicious anemia, grandfather  with lung cancer. @ siblings in good health.       Social History:  reports that she has never smoked. She has never used smokeless tobacco. She reports that she does not drink alcohol. Patient is , has 2 children.  Lives in [place].     Code Status: Full, if unable to speak for herself, her  will speak for her.       Allergies:  Allergies   Allergen Reactions   • Guaifenesin Er Hives       Medications:  Prior to Admission medications    Medication Sig Start Date End Date Taking? Authorizing Provider   Budesonide (ENTOCORT EC) 3 MG 24 hr capsule Take 3 pills by mouth x 30 days, take 2 pills by mouth x 2 weeks, take 1 pill by mouth x 2 weeks 17   Zehra Solis MD   mesalamine (PENTASA) 500 MG CR capsule Take 2 capsules by mouth 3 (Three) Times a Day. 17   COLLIN Archer       Objective     /69  Pulse 77  Temp 97.2 °F (36.2 °C) (Temporal Artery )   Resp 16  Ht 152.4 cm (60\")  Wt 61.7 kg (136 lb)  SpO2 99%  BMI 26.56 kg/m2     Physical Exam   Constitutional: She is oriented to person, place, and time. She appears well-developed and well-nourished. No distress.   HENT:   Head: Normocephalic and atraumatic.   Eyes: Conjunctivae and EOM are normal. Pupils are equal, round, and reactive to light. No scleral icterus.   Neck: Normal range of motion. Neck supple. No JVD present. No tracheal deviation present.   Cardiovascular: Normal rate, regular rhythm, normal heart sounds and intact distal pulses.  Exam reveals no gallop.    No murmur heard.  Pulmonary/Chest: Effort normal and breath sounds normal. No respiratory distress. She has no wheezes. She has no rales.   Abdominal: Soft. Bowel sounds are normal. She exhibits no distension. There is tenderness. There is no guarding. "   Musculoskeletal: Normal range of motion. She exhibits no edema.   Neurological: She is alert and oriented to person, place, and time.   No obvious deficits noted.   Skin: Skin is warm and dry. No rash noted. She is not diaphoretic. No erythema. No pallor.   Psychiatric: She has a normal mood and affect. Her behavior is normal.   Vitals reviewed.    Pertinent Data:   Lab Results (last 24 hours)     Procedure Component Value Units Date/Time    CBC Auto Differential [[id number]]  (Abnormal) Collected:  12/19/17 1445    Specimen:  Blood Updated:  12/19/17 1520     WBC 18.95 (H) 10*3/mm3      RBC 4.64 10*6/mm3      Hemoglobin 13.4 g/dL      Hematocrit 41.8 %      MCV 90.1 fL      MCH 28.9 pg      MCHC 32.1 (L) g/dL      RDW 14.7 %      RDW-SD 47.8 fl      MPV 10.0 fL      Platelets 435 (H) 10*3/mm3      Neutrophil % 88.3 (H) %      Lymphocyte % 6.2 (L) %      Monocyte % 4.7 %      Eosinophil % 0.3 %      Basophil % 0.1 %      Immature Grans % 0.4 %      Neutrophils, Absolute 16.72 (H) 10*3/mm3      Lymphocytes, Absolute 1.18 10*3/mm3      Monocytes, Absolute 0.90 10*3/mm3      Eosinophils, Absolute 0.06 10*3/mm3      Basophils, Absolute 0.02 10*3/mm3      Immature Grans, Absolute 0.07 (H) 10*3/mm3      nRBC 0.0 /100 WBC     Comprehensive Metabolic Panel [[id number]]  (Abnormal) Collected:  12/19/17 1445    Specimen:  Blood Updated:  12/19/17 1531     Glucose 102 (H) mg/dL      BUN 10 mg/dL      Creatinine 0.69 mg/dL      Sodium 140 mmol/L      Potassium 3.9 mmol/L      Chloride 102 mmol/L      CO2 25.0 mmol/L      Calcium 9.4 mg/dL      Total Protein 7.8 g/dL      Albumin 4.60 g/dL      ALT (SGPT) 34 U/L      AST (SGOT) 25 U/L      Alkaline Phosphatase 122 (H) U/L      Total Bilirubin 0.3 mg/dL      eGFR Non African Amer 97 mL/min/1.73      Globulin 3.2 gm/dL      A/G Ratio 1.4 g/dL      BUN/Creatinine Ratio 14.5     Anion Gap 13.0 mmol/L     Procalcitonin [[id number]]  (Normal) Collected:  12/19/17 0981    Specimen:   Blood Updated:  12/19/17 1550     Procalcitonin <0.25 ng/mL     hCG, Serum, Qualitative [[id number]]  (Normal) Collected:  12/19/17 1445    Specimen:  Blood Updated:  12/19/17 1550     HCG Qualitative Negative    C-reactive Protein [[id number]]  (Abnormal) Collected:  12/19/17 1445    Specimen:  Blood Updated:  12/19/17 1601     C-Reactive Protein 1.25 (H) mg/dL     Blood Culture - Blood, [[id number]] Collected:  12/19/17 1815    Specimen:  Blood from Arm, Right Updated:  12/19/17 1827    Lactic Acid, Plasma [[id number]]  (Normal) Collected:  12/19/17 1812    Specimen:  Blood Updated:  12/19/17 1836     Lactate 1.2 mmol/L         Imaging Results (last 24 hours)     Procedure Component Value Units Date/Time    CT Abdomen Pelvis With Contrast [[id number]] Collected:  12/19/17 1735     Updated:  12/19/17 1741    Narrative:       EXAMINATION: CT ABDOMEN PELVIS W CONTRAST-      12/19/2017 5:20 PM CST     HISTORY: History of bowel obstruction. Abdominal pain.     In order to have a CT radiation dose as low as reasonably achievable  Automated Exposure Control was utilized for adjustment of the mA and/or  KV according to patient size.     DLP in mGycm= 287.     Axial, sagittal, and coronal CT imaging of the abdomen/pelvis with IV  contrast injection.     Comparison is made with 11/15/2017.     Recurrent distal ileum wall thickening and luminal narrowing compatible  with inflammatory bowel disease. Proximal to this the small bowel is  diffusely dilated and fluid-filled representing a functional  obstruction. There is no hernia.  No obstructing mass.     Mild mesenteric edema around the enhancing narrow distal small bowel.     There is a small amount of free pelvic fluid.     No free air is seen.     Heart size is normal.  The lung bases are clear.     Normal liver, gallbladder, pancreas, spleen, adrenal glands, and  kidneys.     Summary:  1. Distal ileum wall thickening with enhancement and inflammation  compatible with  inflammatory bowel disease.  2. Dilated fluid-filled small bowel proximal to the distal ileum   representing a functional obstruction. There is associated mesenteric  inflammation and a small amount of free pelvic fluid.  3. No free air to indicate perforation.      This report was finalized on 12/19/2017 17:38 by Dr. Cortez Bernardo MD.          I have personally reviewed and interpreted the radiology studies and ECG obtained at time of admission.     Assessment / Plan     Assessment:   Severe Sepsis secondary to:  Crohn's flare with possible superimposed bacterial infection    Functional obstruction distal ileum  Leukocytosis  Acute abdominal pain  Nausea/dry heaves  Dehydration  Hypotension  Anemia    Plan:   1. Admit inpatient  2. Change Invanz to Levaquin/Flagyl IV  3. Clear liquid sips/ice chips  4. Labs in am: CBC w/diff and BMP  5. NS at 100ml/hour  6. Pain and nausea management  7. Solumedrol 60mg IV every 6 hours  8. GI consulted, followed by Dr. Solis    I discussed the patients findings and my recommendations with: Geovanni Manzanares MD  Time spent: 45 minutes      COLLIN Fields  12/19/17   6:41 PM     I personally evaluated and examined the patient in conjunction with COLLIN Paz and agree with the assessment, treatment plan, and disposition of the patient as recorded by her. My history, exam, and further recommendations are:     Agree  Continue IV Abx  IVF  GI consult in AM    Case Manzanares MD  12/20/17  6:30 PM

## 2017-12-20 NOTE — PAYOR COMM NOTE
"ADMIT INPT 12-19-17  UR PHONE  [phone]   [phone]    Dhraa Santiago (32 y.o. Female)     Date of Birth Social Security Number Address Home Phone MRN    1985  [address]  [facility] [address] [phone] [id number]    [employer] Status          [employer]        Admission Date Admission Type Admitting Provider Attending Provider Department, Room/Bed    12/19/17 Emergency Case Manzanares MD Moore, Case Monreal MD [facility] 3C, 373/1    Discharge Date Discharge Disposition Discharge Destination                      Attending Provider: Case Manzanares MD     Allergies:  Guaifenesin Er    Isolation:  None   Infection:  None   Code Status:  FULL    Ht:  152.4 cm (60\")   Wt:  61.7 kg (136 lb)    Admission Cmt:  None   Principal Problem:  None                Active Insurance as of 12/19/2017     Primary Coverage     Payor Plan Insurance Group Employer/Plan Group    PAS[employer]ORT PAS[employer]ORT MEDICAID     Payor Plan Address Payor Plan Phone Number Effective From Effective To    [address] [phone] 11/1/2016     [place] [address]       Subscriber Name Subscriber Birth Date Member ID       DHARA SANTIAGO 1985 [id number]                 Emergency Contacts      (Rel.) Home Phone Work Phone Mobile Phone    Tomasz Santiago (Spouse) -- -- [phone]               History & Physical      Daphne COLLIN Smith at 12/19/2017  6:41 PM              [facility]  HISTORY AND PHYSICAL    Date of Admission: 12/19/2017  Primary Care Physician: Alfred Lawrenec MD    Subjective     Chief Complaint: Abdominal pain/nausea    History of Present Illness  Dhara Santiago is a pleasant 32 year old  female with a history of Crohns.  Diagnosed in 2003.  Patient states she did not start having trouble with it until earlier this year.  She is followed by Zehra Solis MD GI.  Currently she is not on " active therapy.  She reports developing pain at approximately 2 am today.  Pain progressively worsened followed by nausea and dry heaving.  Currently pain is scored 8 on scale of 1/10.  Dilaudid helped but Fentanyl did not. No heaves since treatment given earlier.  Patient is admitted for further evaluation and treatment.     Review of Systems   Constitutional: Negative for activity change, appetite change, fatigue and fever.   HENT: Negative for congestion, mouth sores, rhinorrhea, sinus pressure and trouble swallowing.    Respiratory: Negative for cough, chest tightness, shortness of breath and wheezing.    Cardiovascular: Negative for chest pain, palpitations and leg swelling.   Gastrointestinal: Positive for abdominal pain, nausea and vomiting (dry heaving). Negative for abdominal distention, constipation and diarrhea.   Genitourinary: Negative for difficulty urinating, dysuria and frequency.   Musculoskeletal: Negative for arthralgias and myalgias.   Neurological: Negative for dizziness, weakness and light-headedness.   Psychiatric/Behavioral: Negative for agitation and sleep disturbance. The patient is not nervous/anxious.         Past Medical History:   Past Medical History:   Diagnosis Date   • Crohn's disease        Past Surgical History:   Past Surgical History:   Procedure Laterality Date   • ANAL FISTULA REPAIR     •  SECTION     • COLONOSCOPY  2016   • DILATATION AND CURETTAGE         Family History: family history is negative for Colon cancer and Colon polyps. Mother alive in good health.  Father alive with hypercholesteremia. Grand mother with pernicious anemia, grandfather  with lung cancer. @ siblings in good health.       Social History:  reports that she has never smoked. She has never used smokeless tobacco. She reports that she does not drink alcohol. Patient is , has 2 children.  Lives in [place].     Code Status: Full, if unable to speak for herself, her   "will speak for her.       Allergies:  Allergies   Allergen Reactions   • Guaifenesin Er Hives       Medications:  Prior to Admission medications    Medication Sig Start Date End Date Taking? Authorizing Provider   Budesonide (ENTOCORT EC) 3 MG 24 hr capsule Take 3 pills by mouth x 30 days, take 2 pills by mouth x 2 weeks, take 1 pill by mouth x 2 weeks 11/18/17   Zehra Solis MD   mesalamine (PENTASA) 500 MG CR capsule Take 2 capsules by mouth 3 (Three) Times a Day. 9/25/17   COLLIN Archer       Objective     /69  Pulse 77  Temp 97.2 °F (36.2 °C) (Temporal Artery )   Resp 16  Ht 152.4 cm (60\")  Wt 61.7 kg (136 lb)  SpO2 99%  BMI 26.56 kg/m2     Physical Exam   Constitutional: She is oriented to person, place, and time. She appears well-developed and well-nourished. No distress.   HENT:   Head: Normocephalic and atraumatic.   Eyes: Conjunctivae and EOM are normal. Pupils are equal, round, and reactive to light. No scleral icterus.   Neck: Normal range of motion. Neck supple. No JVD present. No tracheal deviation present.   Cardiovascular: Normal rate, regular rhythm, normal heart sounds and intact distal pulses.  Exam reveals no gallop.    No murmur heard.  Pulmonary/Chest: Effort normal and breath sounds normal. No respiratory distress. She has no wheezes. She has no rales.   Abdominal: Soft. Bowel sounds are normal. She exhibits no distension. There is tenderness. There is no guarding.   Musculoskeletal: Normal range of motion. She exhibits no edema.   Neurological: She is alert and oriented to person, place, and time.   No obvious deficits noted.   Skin: Skin is warm and dry. No rash noted. She is not diaphoretic. No erythema. No pallor.   Psychiatric: She has a normal mood and affect. Her behavior is normal.   Vitals reviewed.    Pertinent Data:   Lab Results (last 24 hours)     Procedure Component Value Units Date/Time    CBC Auto Differential [[id number]]  (Abnormal) Collected:  12/19/17 1445 "    Specimen:  Blood Updated:  12/19/17 1520     WBC 18.95 (H) 10*3/mm3      RBC 4.64 10*6/mm3      Hemoglobin 13.4 g/dL      Hematocrit 41.8 %      MCV 90.1 fL      MCH 28.9 pg      MCHC 32.1 (L) g/dL      RDW 14.7 %      RDW-SD 47.8 fl      MPV 10.0 fL      Platelets 435 (H) 10*3/mm3      Neutrophil % 88.3 (H) %      Lymphocyte % 6.2 (L) %      Monocyte % 4.7 %      Eosinophil % 0.3 %      Basophil % 0.1 %      Immature Grans % 0.4 %      Neutrophils, Absolute 16.72 (H) 10*3/mm3      Lymphocytes, Absolute 1.18 10*3/mm3      Monocytes, Absolute 0.90 10*3/mm3      Eosinophils, Absolute 0.06 10*3/mm3      Basophils, Absolute 0.02 10*3/mm3      Immature Grans, Absolute 0.07 (H) 10*3/mm3      nRBC 0.0 /100 WBC     Comprehensive Metabolic Panel [[id number]]  (Abnormal) Collected:  12/19/17 1445    Specimen:  Blood Updated:  12/19/17 1531     Glucose 102 (H) mg/dL      BUN 10 mg/dL      Creatinine 0.69 mg/dL      Sodium 140 mmol/L      Potassium 3.9 mmol/L      Chloride 102 mmol/L      CO2 25.0 mmol/L      Calcium 9.4 mg/dL      Total Protein 7.8 g/dL      Albumin 4.60 g/dL      ALT (SGPT) 34 U/L      AST (SGOT) 25 U/L      Alkaline Phosphatase 122 (H) U/L      Total Bilirubin 0.3 mg/dL      eGFR Non African Amer 97 mL/min/1.73      Globulin 3.2 gm/dL      A/G Ratio 1.4 g/dL      BUN/Creatinine Ratio 14.5     Anion Gap 13.0 mmol/L     Procalcitonin [[id number]]  (Normal) Collected:  12/19/17 1445    Specimen:  Blood Updated:  12/19/17 1550     Procalcitonin <0.25 ng/mL     hCG, Serum, Qualitative [[id number]]  (Normal) Collected:  12/19/17 1445    Specimen:  Blood Updated:  12/19/17 1550     HCG Qualitative Negative    C-reactive Protein [[id number]]  (Abnormal) Collected:  12/19/17 1445    Specimen:  Blood Updated:  12/19/17 1601     C-Reactive Protein 1.25 (H) mg/dL     Blood Culture - Blood, [[id number]] Collected:  12/19/17 1815    Specimen:  Blood from Arm, Right Updated:  12/19/17 1827    Lactic Acid, Plasma [[id number]]   (Normal) Collected:  12/19/17 1812    Specimen:  Blood Updated:  12/19/17 1836     Lactate 1.2 mmol/L         Imaging Results (last 24 hours)     Procedure Component Value Units Date/Time    CT Abdomen Pelvis With Contrast [[id number]] Collected:  12/19/17 1735     Updated:  12/19/17 1741    Narrative:       EXAMINATION: CT ABDOMEN PELVIS W CONTRAST-      12/19/2017 5:20 PM CST     HISTORY: History of bowel obstruction. Abdominal pain.     In order to have a CT radiation dose as low as reasonably achievable  Automated Exposure Control was utilized for adjustment of the mA and/or  KV according to patient size.     DLP in mGycm= 287.     Axial, sagittal, and coronal CT imaging of the abdomen/pelvis with IV  contrast injection.     Comparison is made with 11/15/2017.     Recurrent distal ileum wall thickening and luminal narrowing compatible  with inflammatory bowel disease. Proximal to this the small bowel is  diffusely dilated and fluid-filled representing a functional  obstruction. There is no hernia.  No obstructing mass.     Mild mesenteric edema around the enhancing narrow distal small bowel.     There is a small amount of free pelvic fluid.     No free air is seen.     Heart size is normal.  The lung bases are clear.     Normal liver, gallbladder, pancreas, spleen, adrenal glands, and  kidneys.     Summary:  1. Distal ileum wall thickening with enhancement and inflammation  compatible with inflammatory bowel disease.  2. Dilated fluid-filled small bowel proximal to the distal ileum   representing a functional obstruction. There is associated mesenteric  inflammation and a small amount of free pelvic fluid.  3. No free air to indicate perforation.      This report was finalized on 12/19/2017 17:38 by Dr. Cortez Bernardo MD.          I have personally reviewed and interpreted the radiology studies and ECG obtained at time of admission.     Assessment / Plan     Assessment:   Crohn's flare  Functional obstruction  distal ileum  Leukocytosis  Acute abdominal pain  Nausea/dry heaves  Dehydration  Hypotension    Plan:   1. Admit inpatient  2. Change Invanz to Levaquin/Flagyl IV  3. Clear liquid sips/ice chips  4. Labs in am: CBC w/diff and BMP  5. NS at 100ml/hour  6. Pain and nausea management  7. Solumedrol 60mg IV every 6 hours  8. GI consulted, followed by Dr. Solis    I discussed the patients findings and my recommendations with: Geovanni Manzanares MD  Time spent: 45 minutes      COLLIN Fields  12/19/17   6:41 PM     Electronically signed by COLLIN Cole at 12/19/2017  7:00 PM           Emergency Department Notes      Neal Jordan MD at 12/19/2017  2:44 PM          Subjective   Patient is a 32 y.o. female presenting with abdominal pain.   Abdominal Pain   Pain location:  Generalized  Pain quality: aching and bloating    Pain quality: not dull, not heavy and no pressure    Pain radiates to:  Does not radiate  Pain severity:  Mild  Onset quality:  Gradual  Timing:  Constant  Progression:  Worsening  Context: not alcohol use, not diet changes, not eating, not laxative use, not medication withdrawal, not recent illness, not recent travel and not sick contacts    Relieved by:  Nothing  Worsened by:  Nothing  Ineffective treatments:  None tried  Associated symptoms: chills, diarrhea, nausea and vomiting    Associated symptoms: no anorexia, no belching, no constipation, no cough, no flatus, no hematemesis, no hematuria and no sore throat    Risk factors: no alcohol abuse, no aspirin use, has not had multiple surgeries and no NSAID use        Review of Systems   Constitutional: Positive for chills.   HENT: Negative.  Negative for sore throat.    Eyes: Negative.    Respiratory: Negative.  Negative for cough.    Cardiovascular: Negative.    Gastrointestinal: Positive for abdominal pain, diarrhea, nausea and vomiting. Negative for anorexia, constipation, flatus and hematemesis.   Endocrine: Negative.    Genitourinary:  Negative.  Negative for hematuria.   Skin: Negative.    Neurological: Negative.    Hematological: Negative.    All other systems reviewed and are negative.      Past Medical History:   Diagnosis Date   • Crohn's disease        Allergies   Allergen Reactions   • Guaifenesin Er Hives       Past Surgical History:   Procedure Laterality Date   • ANAL FISTULA REPAIR     •  SECTION     • COLONOSCOPY  2016   • DILATATION AND CURETTAGE         Family History   Problem Relation Age of Onset   • Colon cancer Neg Hx    • Colon polyps Neg Hx        Social History     Social History   • Marital status:      Spouse name: N/A   • Number of children: N/A   • Years of education: N/A     Social History Main Topics   • Smoking status: Never Smoker   • Smokeless tobacco: Never Used   • Alcohol use No   • Drug use: None   • Sexual activity: Not Asked     Other Topics Concern   • None     Social History Narrative           Objective   Physical Exam   Constitutional: She is oriented to person, place, and time. She appears well-developed and well-nourished.  Non-toxic appearance.   HENT:   Head: Normocephalic and atraumatic.   Mouth/Throat: Oropharynx is clear and moist.   Eyes: Conjunctivae are normal. Pupils are equal, round, and reactive to light.   Neck: Normal range of motion. Neck supple. No hepatojugular reflux and no JVD present.   Cardiovascular: Normal rate, regular rhythm, normal heart sounds and intact distal pulses.  PMI is not displaced.  Exam reveals no decreased pulses.    No murmur heard.  Pulmonary/Chest: Effort normal and breath sounds normal. No accessory muscle usage. No apnea. No respiratory distress. She has no decreased breath sounds. She has no wheezes.   Abdominal: Normal appearance, normal aorta and bowel sounds are normal. She exhibits no shifting dullness, no distension, no fluid wave, no abdominal bruit, no ascites, no pulsatile midline mass and no mass. There is tenderness. There is no  guarding.   Musculoskeletal: Normal range of motion.   Neurological: She is alert and oriented to person, place, and time. She has normal strength and normal reflexes. No cranial nerve deficit. GCS eye subscore is 4. GCS verbal subscore is 5. GCS motor subscore is 6.   Skin: Skin is warm and dry.   Psychiatric: She has a normal mood and affect. Her behavior is normal.   Nursing note and vitals reviewed.      Procedures        ED Course  ED Course   Comment By Time   case discussed the patient is feeling better but still having abdominal pain she had Doppler blood pressure was given fluid boluses IV antibiotics CT shows inflammatory bowel disease will be admitted Neal Jordan MD 12/19 1805                  MDM    Final diagnoses:   Crohn's disease of small intestine with complication   Hypotension, unspecified hypotension type   Enteritis   SIRS (systemic inflammatory response syndrome)            Neal Jordan MD  12/19/17 [id number]       Electronically signed by Neal Jordan MD at 12/19/2017  6:08 PM      Sofia Vasquez RN at 12/19/2017  2:52 PM          Pt vomiting. Dr Jordan notified. Oral contrast held for nausea medication to take effect.     Sofia Vasquez RN  12/19/17 6572       Electronically signed by Sofia Vasquez RN at 12/19/2017  2:53 PM      Sofia Vasquez RN at 12/19/2017  2:58 PM          Spoke with francisco in CT, cancel oral contrast. CT is to be done with IV contrast only.     Sofia Vasquez RN  12/19/17 3690       Electronically signed by Sofia Vasquez RN at 12/19/2017  2:58 PM        Vital Signs (last 72 hrs)       12/17 0700  -  12/18 0659 12/18 0700  -  12/19 0659 12/19 0700  -  12/20 0659 12/20 0700  -  12/20 0815   Most Recent    Temp (°F)     97.2 -  98.3      98.2     98.2 (36.8)    Heart Rate     72 -  101      76     76    Resp     14 -  18      16     16    BP     (!)85/56 -  113/84      96/59     96/59    SpO2 (%)     94 -  100      98     98          Intake & Output (last  "3 days)     None        Lines, Drains & Airways    Active LDAs     Name:   Placement date:   Placement time:   Site:   Days:    Peripheral IV Line - Single Lumen 12/19/17 1440 median vein (underside of arm), left 20 gauge;1 in length  12/19/17    1440      less than 1         Inactive LDAs     None                Hospital Medications (all)       Dose Frequency Start End    dexamethasone (DECADRON) injection 10 mg 10 mg Once 12/19/2017 12/19/2017    Sig - Route: Infuse 1 mL into a venous catheter 1 (One) Time. - Intravenous    ertapenem (INVanz) 1 g/100 mL 0.9% NS VTB (mbp) 1 g Once 12/19/2017 12/19/2017    Sig - Route: Infuse 100 mL into a venous catheter 1 (One) Time. - Intravenous    fentaNYL citrate (PF) (SUBLIMAZE) injection 25 mcg 25 mcg Once 12/19/2017 12/19/2017    Sig - Route: Infuse 0.5 mL into a venous catheter 1 (One) Time. - Intravenous    Cosign for Ordering: Required by Neal Jordan MD    HYDROmorphone (DILAUDID) injection 0.5 mg 0.5 mg Every 2 Hours PRN 12/19/2017 12/29/2017    Sig - Route: Infuse 0.25 mL into a venous catheter Every 2 (Two) Hours As Needed for Severe Pain . - Intravenous    Linked Group 1:  \"And\" Linked Group Details        HYDROmorphone (DILAUDID) injection 1 mg 1 mg Once 12/19/2017 12/19/2017    Sig - Route: Infuse 1 mL into a venous catheter 1 (One) Time. - Intravenous    HYDROmorphone (DILAUDID) injection 1 mg 1 mg Once 12/19/2017 12/19/2017    Sig - Route: Infuse 1 mL into a venous catheter 1 (One) Time. - Intravenous    Cosign for Ordering: Required by Neal Jordan MD    iopamidol (ISOVUE-300) 61 % injection 100 mL 100 mL Once in Imaging 12/19/2017 12/19/2017    Sig - Route: Infuse 100 mL into a venous catheter Once. - Intravenous    levoFLOXacin (LEVAQUIN) 750 mg/150 mL D5W (premix) (LEVAQUIN) 750 mg 750 mg Every 24 Hours 12/19/2017 12/24/2017    Sig - Route: Infuse 150 mL into a venous catheter Daily. - Intravenous    methylPREDNISolone sodium succinate (SOLU-Medrol) " "injection 60 mg 60 mg Every 6 Hours 12/19/2017     Sig - Route: Infuse 0.96 mL into a venous catheter Every 6 (Six) Hours. - Intravenous    metroNIDAZOLE (FLAGYL) IVPB 500 mg 500 mg Every 8 Hours 12/19/2017 12/24/2017    Sig - Route: Infuse 100 mL into a venous catheter Every 8 (Eight) Hours. - Intravenous    Morphine sulfate (PF) injection 2 mg 2 mg Every 4 Hours PRN 12/19/2017 12/29/2017    Sig - Route: Infuse 1 mL into a venous catheter Every 4 (Four) Hours As Needed for Severe Pain . - Intravenous    naloxone (NARCAN) injection 0.4 mg 0.4 mg Every 5 Minutes PRN 12/19/2017     Sig - Route: Infuse 1 mL into a venous catheter Every 5 (Five) Minutes As Needed for Respiratory Depression. - Intravenous    Linked Group 1:  \"And\" Linked Group Details        ondansetron (ZOFRAN) injection 4 mg 4 mg Once 12/19/2017 12/19/2017    Sig - Route: Infuse 2 mL into a venous catheter 1 (One) Time. - Intravenous    ondansetron (ZOFRAN) injection 4 mg 4 mg Every 6 Hours PRN 12/19/2017     Sig - Route: Infuse 2 mL into a venous catheter Every 6 (Six) Hours As Needed for Nausea or Vomiting. - Intravenous    Linked Group 2:  \"Or\" Linked Group Details        ondansetron (ZOFRAN) tablet 4 mg 4 mg Every 6 Hours PRN 12/19/2017     Sig - Route: Take 1 tablet by mouth Every 6 (Six) Hours As Needed for Nausea or Vomiting. - Oral    Linked Group 2:  \"Or\" Linked Group Details        ondansetron ODT (ZOFRAN-ODT) disintegrating tablet 4 mg 4 mg Every 6 Hours PRN 12/19/2017     Sig - Route: Take 1 tablet by mouth Every 6 (Six) Hours As Needed for Nausea or Vomiting. - Oral    Linked Group 2:  \"Or\" Linked Group Details        sodium chloride 0.9 % bolus 1,000 mL 1,000 mL Once 12/19/2017 12/19/2017    Sig - Route: Infuse 1,000 mL into a venous catheter 1 (One) Time. - Intravenous    sodium chloride 0.9 % bolus 1,851 mL 30 mL/kg × 61.7 kg Continuous 12/19/2017 12/19/2017    Sig - Route: Infuse 1,851 mL into a venous catheter Continuous. - " Intravenous    sodium chloride 0.9 % flush 1-10 mL 1-10 mL As Needed 12/19/2017     Sig - Route: Infuse 1-10 mL into a venous catheter As Needed for Line Care. - Intravenous    sodium chloride 0.9 % infusion 100 mL/hr Continuous 12/19/2017     Sig - Route: Infuse 100 mL/hr into a venous catheter Continuous. - Intravenous          Lab Results (all)     Procedure Component Value Units Date/Time    CBC & Differential [[id number]] Collected:  12/19/17 1445    Specimen:  Blood Updated:  12/19/17 1520    Narrative:       The following orders were created for panel order CBC & Differential.  Procedure                               Abnormality         Status                     ---------                               -----------         ------                     Scan Slide[[id number]]                                                                  CBC Auto Differential[[id number]]        Abnormal            Final result                 Please view results for these tests on the individual orders.    CBC Auto Differential [[id number]]  (Abnormal) Collected:  12/19/17 1445    Specimen:  Blood Updated:  12/19/17 1520     WBC 18.95 (H) 10*3/mm3      RBC 4.64 10*6/mm3      Hemoglobin 13.4 g/dL      Hematocrit 41.8 %      MCV 90.1 fL      MCH 28.9 pg      MCHC 32.1 (L) g/dL      RDW 14.7 %      RDW-SD 47.8 fl      MPV 10.0 fL      Platelets 435 (H) 10*3/mm3      Neutrophil % 88.3 (H) %      Lymphocyte % 6.2 (L) %      Monocyte % 4.7 %      Eosinophil % 0.3 %      Basophil % 0.1 %      Immature Grans % 0.4 %      Neutrophils, Absolute 16.72 (H) 10*3/mm3      Lymphocytes, Absolute 1.18 10*3/mm3      Monocytes, Absolute 0.90 10*3/mm3      Eosinophils, Absolute 0.06 10*3/mm3      Basophils, Absolute 0.02 10*3/mm3      Immature Grans, Absolute 0.07 (H) 10*3/mm3      nRBC 0.0 /100 WBC     Comprehensive Metabolic Panel [[id number]]  (Abnormal) Collected:  12/19/17 1445    Specimen:  Blood Updated:  12/19/17 1531     Glucose 102 (H) mg/dL       BUN 10 mg/dL      Creatinine 0.69 mg/dL      Sodium 140 mmol/L      Potassium 3.9 mmol/L      Chloride 102 mmol/L      CO2 25.0 mmol/L      Calcium 9.4 mg/dL      Total Protein 7.8 g/dL      Albumin 4.60 g/dL      ALT (SGPT) 34 U/L      AST (SGOT) 25 U/L      Alkaline Phosphatase 122 (H) U/L      Total Bilirubin 0.3 mg/dL      eGFR Non African Amer 97 mL/min/1.73      Globulin 3.2 gm/dL      A/G Ratio 1.4 g/dL      BUN/Creatinine Ratio 14.5     Anion Gap 13.0 mmol/L     [place] Draw [[id number]] Collected:  12/19/17 1445    Specimen:  Blood Updated:  12/19/17 1546    Narrative:       The following orders were created for panel order [place] Draw.  Procedure                               Abnormality         Status                     ---------                               -----------         ------                     Light Blue Top[[id number]]                                   Final result               Green Top (Gel)[[id number]]                                  Final result               Lavender Top[[id number]]                                     Final result               Red Top[[id number]]                                          Final result                 Please view results for these tests on the individual orders.    Light Blue Top [[id number]] Collected:  12/19/17 1445    Specimen:  Blood Updated:  12/19/17 1546     Extra Tube hold for add-on      Auto resulted       Green Top (Gel) [[id number]] Collected:  12/19/17 1445    Specimen:  Blood Updated:  12/19/17 1546     Extra Tube Hold for add-ons.      Auto resulted.       Lavender Top [[id number]] Collected:  12/19/17 1445    Specimen:  Blood Updated:  12/19/17 1546     Extra Tube hold for add-on      Auto resulted       Red Top [[id number]] Collected:  12/19/17 1445    Specimen:  Blood Updated:  12/19/17 1546     Extra Tube Hold for add-ons.      Auto resulted.       Procalcitonin [[id number]]  (Normal) Collected:  12/19/17 1445    Specimen:  Blood Updated:   12/19/17 1550     Procalcitonin <0.25 ng/mL     Narrative:       SIRS, sepsis, severe sepsis, and septic shock are categorized according to the criteria of the consensus conference of the American College of Chest Physicians/Society of Critical Care Medicine.    PCT < 0.5 ng/mL     Systemic infection (sepsis) is not likely.    PCT >0.5 and < 2.0 ng/mL Systemic infection (sepsis) is possible, but other conditions are known to elevate PCT as well.    PCT > 2.0 ng/mL     Systemic infection (sepsis) is likely, unless other causes are known.      PCT > 10.0 ng/mL    Important systemic inflammatory response, almost exclusively due to severe bacterial sepsis or septic shock.    PCT values of < 0.5 ng/mL do not exclude an infection, because localized infections (without systemic signs) may be associated with such low concentrations, or a systemic infection in its initial stages (<6 hours).  Increased PCT can occur without infection.  PCT concentrations between 0.5 and 2.0 ng/mL should be interpreted taking into account the patients history.  It is recommended to retest PCT within 6-24 hours if any concentrations < 2.0 ng/mL are obtained.    hCG, Serum, Qualitative [[id number]]  (Normal) Collected:  12/19/17 1445    Specimen:  Blood Updated:  12/19/17 1550     HCG Qualitative Negative    C-reactive Protein [[id number]]  (Abnormal) Collected:  12/19/17 1445    Specimen:  Blood Updated:  12/19/17 1601     C-Reactive Protein 1.25 (H) mg/dL     Lactic Acid, Plasma [[id number]]  (Normal) Collected:  12/19/17 1812    Specimen:  Blood Updated:  12/19/17 1836     Lactate 1.2 mmol/L     Blood Culture With EVIN - Blood, [[id number]] Collected:  12/19/17 1829    Specimen:  Blood from Arm, Left Updated:  12/19/17 2235    Blood Culture - Blood, [[id number]]  (Normal) Collected:  12/19/17 1815    Specimen:  Blood from Arm, Right Updated:  12/20/17 0631     Blood Culture No growth at less than 24 hours    CBC Auto Differential [[id number]]   (Abnormal) Collected:  12/20/17 0629    Specimen:  Blood Updated:  12/20/17 0705     WBC 13.17 (H) 10*3/mm3      RBC 3.85 (L) 10*6/mm3      Hemoglobin 10.9 (L) g/dL      Hematocrit 33.8 (L) %      MCV 87.8 fL      MCH 28.3 pg      MCHC 32.2 (L) g/dL      RDW 14.4 %      RDW-SD 46.2 fl      MPV 9.9 fL      Platelets 397 10*3/mm3      Neutrophil % 93.9 (H) %      Lymphocyte % 4.9 (L) %      Monocyte % 0.6 (L) %      Eosinophil % 0.0 %      Basophil % 0.0 %      Immature Grans % 0.6 %      Neutrophils, Absolute 12.36 (H) 10*3/mm3      Lymphocytes, Absolute 0.65 (L) 10*3/mm3      Monocytes, Absolute 0.08 (L) 10*3/mm3      Eosinophils, Absolute 0.00 10*3/mm3      Basophils, Absolute 0.00 10*3/mm3      Immature Grans, Absolute 0.08 (H) 10*3/mm3      nRBC 0.0 /100 WBC     Basic Metabolic Panel [[id number]]  (Abnormal) Collected:  12/20/17 0629    Specimen:  Blood Updated:  12/20/17 0718     Glucose 119 (H) mg/dL      BUN 10 mg/dL      Creatinine 0.51 mg/dL      Sodium 139 mmol/L      Potassium 4.1 mmol/L      Chloride 106 mmol/L      CO2 22.0 (L) mmol/L      Calcium 7.8 (L) mg/dL      eGFR Non African Amer 138 mL/min/1.73      BUN/Creatinine Ratio 19.6     Anion Gap 11.0 mmol/L     Narrative:       GFR Normal >60  Chronic Kidney Disease <60  Kidney Failure <15          Imaging Results (all)     Procedure Component Value Units Date/Time    CT Abdomen Pelvis With Contrast [[id number]] Collected:  12/19/17 1735     Updated:  12/19/17 1741    Narrative:       EXAMINATION: CT ABDOMEN PELVIS W CONTRAST-      12/19/2017 5:20 PM CST     HISTORY: History of bowel obstruction. Abdominal pain.     In order to have a CT radiation dose as low as reasonably achievable  Automated Exposure Control was utilized for adjustment of the mA and/or  KV according to patient size.     DLP in mGycm= 287.     Axial, sagittal, and coronal CT imaging of the abdomen/pelvis with IV  contrast injection.     Comparison is made with 11/15/2017.     Recurrent  distal ileum wall thickening and luminal narrowing compatible  with inflammatory bowel disease. Proximal to this the small bowel is  diffusely dilated and fluid-filled representing a functional  obstruction. There is no hernia.  No obstructing mass.     Mild mesenteric edema around the enhancing narrow distal small bowel.     There is a small amount of free pelvic fluid.     No free air is seen.     Heart size is normal.  The lung bases are clear.     Normal liver, gallbladder, pancreas, spleen, adrenal glands, and  kidneys.     Summary:  1. Distal ileum wall thickening with enhancement and inflammation  compatible with inflammatory bowel disease.  2. Dilated fluid-filled small bowel proximal to the distal ileum  representing a functional obstruction. There is associated mesenteric  inflammation and a small amount of free pelvic fluid.  3. No free air to indicate perforation.                                   This report was finalized on 12/19/2017 17:38 by Dr. Cortez Bernardo MD.          Orders (all)     Start     Ordered    12/20/17 0629  Blood Culture With EVIN - Blood,  Once,   Status:  Canceled      12/19/17 2150    12/20/17 0600  Basic Metabolic Panel  Morning Draw      12/19/17 2002 12/20/17 0600  CBC Auto Differential  Morning Draw      12/19/17 2002 12/20/17 0000  Vital Signs  Every 4 Hours      12/19/17 2002 12/19/17 2148  Blood Culture With EVIN - Blood,  Once      12/19/17 2150    12/19/17 2145  metroNIDAZOLE (FLAGYL) IVPB 500 mg  Every 8 Hours      12/19/17 2002 12/19/17 2100  Strict Intake and Output  Every Hour      12/19/17 2002 12/19/17 2045  sodium chloride 0.9 % infusion  Continuous      12/19/17 2002 12/19/17 2045  methylPREDNISolone sodium succinate (SOLU-Medrol) injection 60 mg  Every 6 Hours      12/19/17 2002 12/19/17 2045  levoFLOXacin (LEVAQUIN) 750 mg/150 mL D5W (premix) (LEVAQUIN) 750 mg  Every 24 Hours      12/19/17 2002 12/19/17 2006  Morphine sulfate (PF) injection  2 mg  Every 4 Hours PRN      12/19/17 2006 12/19/17 2003  Weigh Patient  Once      12/19/17 2002 12/19/17 2003  Oxygen Therapy-  Continuous      12/19/17 2002 12/19/17 2003  Insert Peripheral IV  Once      12/19/17 2002 12/19/17 2003  Saline Lock & Maintain IV Access  Continuous      12/19/17 2002 12/19/17 2003  Full Code  Continuous      12/19/17 2002 12/19/17 2003  VTE Risk Assessment - Low Risk  Once      12/19/17 2002 12/19/17 2003  Pharmacologic VTE Prophylaxis Not Indicated: Low Risk for VTE  Once      12/19/17 2002 12/19/17 2003  Place Sequential Compression Device  Once      12/19/17 2002 12/19/17 2003  Maintain Sequential Compression Device  Continuous      12/19/17 2002 12/19/17 2003  Diet Clear Liquid  Diet Effective Now     Comments:  Sips and ice chips    12/19/17 2002 12/19/17 2003  Inpatient Consult to Gastroenterology  Once     Specialty:  Gastroenterology  Provider:  (Not yet assigned)    12/19/17 2002 12/19/17 2003  Urinalysis With / Culture If Indicated - Urine, Clean Catch  Once      12/19/17 2002 12/19/17 2002  HYDROmorphone (DILAUDID) injection 0.5 mg  Every 2 Hours PRN      12/19/17 2002 12/19/17 2002  naloxone (NARCAN) injection 0.4 mg  Every 5 Minutes PRN      12/19/17 2002 12/19/17 2002  ondansetron (ZOFRAN) tablet 4 mg  Every 6 Hours PRN      12/19/17 2002 12/19/17 2002  ondansetron ODT (ZOFRAN-ODT) disintegrating tablet 4 mg  Every 6 Hours PRN      12/19/17 2002 12/19/17 2002  ondansetron (ZOFRAN) injection 4 mg  Every 6 Hours PRN      12/19/17 2002 12/19/17 2002  sodium chloride 0.9 % flush 1-10 mL  As Needed      12/19/17 2002 12/19/17 1826  HYDROmorphone (DILAUDID) injection 1 mg  Once      12/19/17 1824    12/19/17 1808  Inpatient Admission  Once      12/19/17 1808    12/19/17 1722  iopamidol (ISOVUE-300) 61 % injection 100 mL  Once in Imaging      12/19/17 1720    12/19/17 1639  fentaNYL citrate (PF) (SUBLIMAZE) injection 25  mcg  Once      12/19/17 1637    12/19/17 1634  sodium chloride 0.9 % bolus 1,851 mL  Continuous      12/19/17 1632    12/19/17 1634  ertapenem (INVanz) 1 g/100 mL 0.9% NS VTB (mbp)  Once      12/19/17 1632    12/19/17 1634  dexamethasone (DECADRON) injection 10 mg  Once      12/19/17 1632    12/19/17 1632  Blood Culture - Blood,  Once,   Status:  Canceled      12/19/17 1632    12/19/17 1632  Blood Culture - Blood,  Once      12/19/17 1632    12/19/17 1632  Lactic Acid, Plasma  Once      12/19/17 1632    12/19/17 1506  Scan Slide  Once,   Status:  Canceled      12/19/17 1505    12/19/17 1503  CBC Auto Differential  Once      12/19/17 1502    12/19/17 1457  CT Abdomen Pelvis With Contrast  1 Time Imaging      12/19/17 1457    12/19/17 1449  sodium chloride 0.9 % bolus 1,000 mL  Once      12/19/17 1447    12/19/17 1449  HYDROmorphone (DILAUDID) injection 1 mg  Once      12/19/17 1447    12/19/17 1449  ondansetron (ZOFRAN) injection 4 mg  Once      12/19/17 1447    12/19/17 1448  hCG, Serum, Qualitative  Once,   Status:  Canceled      12/19/17 1447    12/19/17 1448  hCG, Serum, Qualitative  Once      12/19/17 1448    12/19/17 1447  CBC & Differential  Once      12/19/17 1447    12/19/17 1447  Comprehensive Metabolic Panel  Once      12/19/17 1447    12/19/17 1447  C-reactive Protein  Once      12/19/17 1447    12/19/17 1447  Procalcitonin  Once      12/19/17 1447    12/19/17 1447  CT Abdomen Pelvis With Contrast  1 Time Imaging,   Status:  Canceled     Comments:  IV AND ORAL CONTRAST      12/19/17 1447    12/19/17 1446  Carmen Draw  Once      12/19/17 1445    12/19/17 1446  Light Blue Top  PROCEDURE ONCE      12/19/17 1445    12/19/17 1446  Green Top (Gel)  PROCEDURE ONCE      12/19/17 1445    12/19/17 1446  Lavender Top  PROCEDURE ONCE      12/19/17 1445    12/19/17 1446  Red Top  PROCEDURE ONCE      12/19/17 1445    --  ciprofloxacin (CIPRO) 500 MG tablet  2 Times Daily      12/19/17 2215    --  Multiple  Vitamins-Minerals (MULTIVITAMIN WITH MINERALS) tablet tablet  Nightly      12/19/17 1847          Physician Progress Notes (last 24 hours) (Notes from 12/19/2017  8:15 AM through 12/20/2017  8:15 AM)     No notes of this type exist for this encounter.        Consult Notes (last 24 hours) (Notes from 12/19/2017  8:15 AM through 12/20/2017  8:15 AM)     No notes of this type exist for this encounter.

## 2017-12-20 NOTE — TELEPHONE ENCOUNTER
Please get the ball rolling to initiate Humira.  All the baseline laboratories have been done and are unremarkable.  I do not want to have her wait until the office appointment.  We will start with 40 mg dose.  It will be 4 injections initially followed by 2 injections in 2 weeks and then one injection every other week thereafter.    Zehra Solis MD

## 2017-12-20 NOTE — PLAN OF CARE
Problem: Infection, Risk/Actual (Adult)  Goal: Identify Related Risk Factors and Signs and Symptoms  Outcome: Ongoing (interventions implemented as appropriate)    Goal: Infection Prevention/Resolution  Outcome: Ongoing (interventions implemented as appropriate)      Problem: Pain, Acute (Adult)  Goal: Identify Related Risk Factors and Signs and Symptoms  Outcome: Ongoing (interventions implemented as appropriate)    Goal: Acceptable Pain Control/Comfort Level  Outcome: Ongoing (interventions implemented as appropriate)

## 2017-12-21 VITALS
OXYGEN SATURATION: 97 % | WEIGHT: 136 LBS | HEIGHT: 60 IN | SYSTOLIC BLOOD PRESSURE: 99 MMHG | RESPIRATION RATE: 16 BRPM | DIASTOLIC BLOOD PRESSURE: 65 MMHG | BODY MASS INDEX: 26.7 KG/M2 | HEART RATE: 78 BPM | TEMPERATURE: 98.2 F

## 2017-12-21 LAB
IRON 24H UR-MRATE: 66 MCG/DL (ref 42–180)
IRON SATN MFR SERPL: 22 % (ref 20–45)
TIBC SERPL-MCNC: 303 MCG/DL (ref 225–420)
VIT B12 BLD-MCNC: 195 PG/ML (ref 239–931)

## 2017-12-21 PROCEDURE — 82607 VITAMIN B-12: CPT | Performed by: FAMILY MEDICINE

## 2017-12-21 PROCEDURE — 83540 ASSAY OF IRON: CPT | Performed by: FAMILY MEDICINE

## 2017-12-21 PROCEDURE — 25010000002 METHYLPREDNISOLONE PER 125 MG: Performed by: NURSE PRACTITIONER

## 2017-12-21 PROCEDURE — 83550 IRON BINDING TEST: CPT | Performed by: FAMILY MEDICINE

## 2017-12-21 RX ORDER — LEVOFLOXACIN 500 MG/1
500 TABLET, FILM COATED ORAL EVERY 24 HOURS
Qty: 4 TABLET | Refills: 0 | Status: SHIPPED | OUTPATIENT
Start: 2017-12-21 | End: 2017-12-26

## 2017-12-21 RX ORDER — METRONIDAZOLE 500 MG/1
500 TABLET ORAL EVERY 8 HOURS SCHEDULED
Qty: 15 TABLET | Refills: 0 | Status: SHIPPED | OUTPATIENT
Start: 2017-12-21 | End: 2017-12-27

## 2017-12-21 RX ADMIN — METHYLPREDNISOLONE SODIUM SUCCINATE 60 MG: 125 INJECTION, POWDER, FOR SOLUTION INTRAMUSCULAR; INTRAVENOUS at 02:13

## 2017-12-21 RX ADMIN — METRONIDAZOLE 500 MG: 500 TABLET ORAL at 05:17

## 2017-12-21 RX ADMIN — METHYLPREDNISOLONE SODIUM SUCCINATE 60 MG: 125 INJECTION, POWDER, FOR SOLUTION INTRAMUSCULAR; INTRAVENOUS at 07:52

## 2017-12-21 NOTE — PLAN OF CARE
Problem: Infection, Risk/Actual (Adult)  Goal: Identify Related Risk Factors and Signs and Symptoms  Outcome: Ongoing (interventions implemented as appropriate)   12/20/17 1327   Infection, Risk/Actual   Infection, Risk/Actual: Related Risk Factors chronic illness/condition   Signs and Symptoms (Infection, Risk/Actual) pain     Goal: Infection Prevention/Resolution  Outcome: Ongoing (interventions implemented as appropriate)   12/21/17 0256   Infection, Risk/Actual (Adult)   Infection Prevention/Resolution making progress toward outcome       Problem: Pain, Acute (Adult)  Goal: Identify Related Risk Factors and Signs and Symptoms  Outcome: Ongoing (interventions implemented as appropriate)   12/20/17 0328   Pain, Acute   Related Risk Factors (Acute Pain) disease process;infection   Signs and Symptoms (Acute Pain) verbalization of pain descriptors     Goal: Acceptable Pain Control/Comfort Level  Outcome: Ongoing (interventions implemented as appropriate)   12/21/17 0256   Pain, Acute (Adult)   Acceptable Pain Control/Comfort Level making progress toward outcome       Problem: Patient Care Overview (Adult)  Goal: Plan of Care Review  Outcome: Ongoing (interventions implemented as appropriate)   12/20/17 1327 12/21/17 0000   Coping/Psychosocial Response Interventions   Plan Of Care Reviewed With --  patient   Patient Care Overview   Progress improving --      Goal: Adult Individualization and Mutuality  Outcome: Ongoing (interventions implemented as appropriate)   12/20/17 1327   Individualization   Patient Specific Interventions PRN pain medication. IV abx. IVF. Clear liquid diet. encourage ambulation.

## 2017-12-21 NOTE — DISCHARGE SUMMARY
"    [facility] Medicine Services  DISCHARGE SUMMARY       Date of Admission: 12/19/2017  Date of Discharge:  12/21/2017  Primary Care Physician: Alfred Lawrence MD    Presenting Problem/History of Present Illness:  Crohn's disease of small intestine with complication [K50.019]     Final Discharge Diagnoses:  1.  Severe Sepsis   -IVF  -IV abx last night, change to PO today     2.  Crohn's flare with possible superimposed infection  -Levaquin/Flagyl     3.   Nausea  -PRN Zofran     4.  Anemia  -Likely iron deficiency secondary to Crohn's  -Monitor H&H    Consults: NA    Procedures Performed: NA    Pertinent Test Results: NA    Chief Complaint on Day of Discharge: Abdominal pain    History of Present Illness on Day of Discharge:   Doing well.    No abdominal pain  No nausea or vomiting  Tolerating PO    Hospital Course:  The patient is a 32 y.o. female who presented to [facility] with abdominal pain, hypotension.  Found to be in severe sepsis of presumed abdominal source.  Ultimately thought to be in Crohn's flare.  Treated with IV antibiotics for possible abdominal infection and IV steroids.  Improved clinically over the next day.  Transitioned to oral antibiotics and tolerated that well. She is eating and drinking well.  She is pain free.  She is comfortable with being discharged and with the discharge plan.   Condition on Discharge:  stable    Physical Exam on Discharge:  BP 99/65 (BP Location: Right arm, Patient Position: Lying)  Pulse 78  Temp 98.2 °F (36.8 °C) (Oral)   Resp 16  Ht 152.4 cm (60\")  Wt 61.7 kg (136 lb)  SpO2 97%  BMI 26.56 kg/m2  Physical Exam   Constitutional: She is oriented to person, place, and time. She appears well-developed and well-nourished.   HENT:   Head: Normocephalic and atraumatic.   Right Ear: External ear normal.   Left Ear: External ear normal.   Nose: Nose normal.   Mouth/Throat: Oropharynx is clear and moist.   Eyes: " Conjunctivae and EOM are normal.   Neck: Normal range of motion. Neck supple.   Cardiovascular: Normal rate, regular rhythm and normal heart sounds.    Pulmonary/Chest: Effort normal and breath sounds normal.   Abdominal: Soft. Bowel sounds are normal. She exhibits no distension. There is no tenderness.   Musculoskeletal: Normal range of motion.   Neurological: She is alert and oriented to person, place, and time.   Skin: Skin is warm and dry.   Psychiatric: She has a normal mood and affect. Her speech is normal and behavior is normal. Cognition and memory are normal.         Discharge Disposition:  Home or Self Care    Discharge Medications:   Dhara Carmona   Home Medication Instructions NATHANAEL:[id number]    Printed on:12/21/17 8032   Medication Information                      Budesonide (ENTOCORT EC) 3 MG 24 hr capsule  Take 3 pills by mouth x 30 days, take 2 pills by mouth x 2 weeks, take 1 pill by mouth x 2 weeks             levoFLOXacin (LEVAQUIN) 500 MG tablet  Take 1 tablet by mouth Daily for 5 doses. Indications: Infection Within the Abdomen             mesalamine (PENTASA) 500 MG CR capsule  Take 2 capsules by mouth 3 (Three) Times a Day.             metroNIDAZOLE (FLAGYL) 500 MG tablet  Take 1 tablet by mouth Every 8 (Eight) Hours for 16 doses. Indications: Infection Within the Abdomen             Multiple Vitamins-Minerals (MULTIVITAMIN WITH MINERALS) tablet tablet  Take 1 tablet by mouth Every Night.                 Discharge Diet:  advance as tolerated    Activity at Discharge: as tolerated    Discharge Care Plan/Instructions: follow up with GI on 1/22/18    Follow-up Appointments:   Future Appointments  Date Time Provider Department Center   1/22/2018 2:30 PM Zehra Solis MD [facility] None       Test Results Pending at Discharge: DONALDO Manzanares MD  12/21/17  12:47 PM    Time: 35 minutes

## 2017-12-22 NOTE — PAYOR COMM NOTE
"[place] home 12-21-17  [id number]    Dhara Santiago (32 y.o. Female)     Date of Birth Social Security Number Address Home Phone MRN    1985  [id number] [phone] [id number]    Latter-day Marital Status          Rastafari        Admission Date Admission Type Admitting Provider Attending Provider Department, Room/Bed    12/19/17 Emergency Case Manzanares MD  [facility] 3C, 373/1    Discharge Date Discharge Disposition Discharge Destination        12/21/2017 Home or Self Care             Attending Provider: (none)    Allergies:  Guaifenesin Er    Isolation:  None   Infection:  None   Code Status:  Prior    Ht:  152.4 cm (60\")   Wt:  61.7 kg (136 lb)    Admission Cmt:  None   Principal Problem:  None                Active Insurance as of 12/19/2017     Primary Coverage     Payor Plan Insurance Group Employer/Plan Group    PAS[employer]ORT [employer]ORT MEDICAID     Payor Plan Address Payor Plan Phone Number Effective From Effective To    [address] [phone] 11/1/2016     [place] [address]       Subscriber Name Subscriber Birth Date Member ID       DHARA SANTIAGO 1985 [id number]                 Emergency Contacts      (Rel.) Home Phone Work Phone Mobile Phone    Tomasz Santiago (Spouse) -- -- [phone]               Discharge Summary      Case Manzanares MD at 12/21/2017 12:39 PM              [facility] Medicine Services  DISCHARGE SUMMARY       Date of Admission: 12/19/2017  Date of Discharge:  12/21/2017  Primary Care Physician: Alfred Lawrence MD    Presenting Problem/History of Present Illness:  Crohn's disease of small intestine with complication [K50.019]     Final Discharge Diagnoses:  1.  Severe Sepsis   -IVF  -IV abx last night, change to PO today     2.  Crohn's flare with possible superimposed infection  -Levaquin/Flagyl     3.   Nausea  -PRN Zofran     4.  Anemia  -Likely iron deficiency " "secondary to Crohn's  -Monitor H&H    Consults: NA    Procedures Performed: NA    Pertinent Test Results: NA    Chief Complaint on Day of Discharge: Abdominal pain    History of Present Illness on Day of Discharge:   Doing well.    No abdominal pain  No nausea or vomiting  Tolerating PO    Hospital Course:  The patient is a 32 y.o. female who presented to [facility] with abdominal pain, hypotension.  Found to be in severe sepsis of presumed abdominal source.  Ultimately thought to be in Crohn's flare.  Treated with IV antibiotics for possible abdominal infection and IV steroids.  Improved clinically over the next day.  Transitioned to oral antibiotics and tolerated that well. She is eating and drinking well.  She is pain free.  She is comfortable with being discharged and with the discharge plan.   Condition on Discharge:  stable    Physical Exam on Discharge:  BP 99/65 (BP Location: Right arm, Patient Position: Lying)  Pulse 78  Temp 98.2 °F (36.8 °C) (Oral)   Resp 16  Ht 152.4 cm (60\")  Wt 61.7 kg (136 lb)  SpO2 97%  BMI 26.56 kg/m2  Physical Exam   Constitutional: She is oriented to person, place, and time. She appears well-developed and well-nourished.   HENT:   Head: Normocephalic and atraumatic.   Right Ear: External ear normal.   Left Ear: External ear normal.   Nose: Nose normal.   Mouth/Throat: Oropharynx is clear and moist.   Eyes: Conjunctivae and EOM are normal.   Neck: Normal range of motion. Neck supple.   Cardiovascular: Normal rate, regular rhythm and normal heart sounds.    Pulmonary/Chest: Effort normal and breath sounds normal.   Abdominal: Soft. Bowel sounds are normal. She exhibits no distension. There is no tenderness.   Musculoskeletal: Normal range of motion.   Neurological: She is alert and oriented to person, place, and time.   Skin: Skin is warm and dry.   Psychiatric: She has a normal mood and affect. Her speech is normal and behavior is normal. Cognition and memory are " normal.         Discharge Disposition:  Home or Self Care    Discharge Medications:   Dhara Carmona   Home Medication Instructions NATHANAEL:[id number]    Printed on:12/21/17 0760   Medication Information                      Budesonide (ENTOCORT EC) 3 MG 24 hr capsule  Take 3 pills by mouth x 30 days, take 2 pills by mouth x 2 weeks, take 1 pill by mouth x 2 weeks             levoFLOXacin (LEVAQUIN) 500 MG tablet  Take 1 tablet by mouth Daily for 5 doses. Indications: Infection Within the Abdomen             mesalamine (PENTASA) 500 MG CR capsule  Take 2 capsules by mouth 3 (Three) Times a Day.             metroNIDAZOLE (FLAGYL) 500 MG tablet  Take 1 tablet by mouth Every 8 (Eight) Hours for 16 doses. Indications: Infection Within the Abdomen             Multiple Vitamins-Minerals (MULTIVITAMIN WITH MINERALS) tablet tablet  Take 1 tablet by mouth Every Night.                 Discharge Diet:  advance as tolerated    Activity at Discharge: as tolerated    Discharge Care Plan/Instructions: follow up with GI on 1/22/18    Follow-up Appointments:   Future Appointments  Date Time Provider Department Center   1/22/2018 2:30 PM Zehra Solis MD [facility] None       Test Results Pending at Discharge: DONALDO Manzanares MD  12/21/17  12:47 PM    Time: 35 minutes           Electronically signed by Case Manzanares MD at 12/21/2017 12:56 PM

## 2017-12-24 LAB
BACTERIA SPEC AEROBE CULT: NORMAL
BACTERIA SPEC AEROBE CULT: NORMAL

## 2018-01-08 ENCOUNTER — TELEPHONE (OUTPATIENT)
Dept: GASTROENTEROLOGY | Facility: CLINIC | Age: 33
End: 2018-01-08

## 2018-01-08 NOTE — TELEPHONE ENCOUNTER
Patient called and states that she went to ER at [facility] and they told her she was inflamed and they gave her a shot for her stomach and shot for pain and another shot that she was not sure what it was. Since then she has taken a couple of pain meds. She is concerned because she has not had a BM since Saturday and wants to know what she should do.

## 2018-01-18 RX ORDER — DOCUSATE SODIUM 100 MG/1
100 CAPSULE, LIQUID FILLED ORAL 2 TIMES DAILY
COMMUNITY

## 2018-01-19 ENCOUNTER — HOSPITAL ENCOUNTER (OUTPATIENT)
Age: 33
Setting detail: OBSERVATION
Discharge: HOME | End: 2018-01-20
Attending: OBSTETRICS & GYNECOLOGY | Admitting: OBSTETRICS & GYNECOLOGY
Payer: COMMERCIAL

## 2018-01-19 ENCOUNTER — ANESTHESIA EVENT (OUTPATIENT)
Dept: OPERATING ROOM | Age: 33
End: 2018-01-19
Payer: COMMERCIAL

## 2018-01-19 ENCOUNTER — ANESTHESIA (OUTPATIENT)
Dept: OPERATING ROOM | Age: 33
End: 2018-01-19
Payer: COMMERCIAL

## 2018-01-19 VITALS
OXYGEN SATURATION: 100 % | DIASTOLIC BLOOD PRESSURE: 49 MMHG | RESPIRATION RATE: 10 BRPM | SYSTOLIC BLOOD PRESSURE: 94 MMHG

## 2018-01-19 PROBLEM — N76.4 ABSCESS OF VULVA: Status: ACTIVE | Noted: 2018-01-19

## 2018-01-19 PROBLEM — N76.4 VULVAR ABSCESS: Status: ACTIVE | Noted: 2018-01-19

## 2018-01-19 LAB
ANION GAP SERPL CALCULATED.3IONS-SCNC: 11 MMOL/L (ref 7–19)
BASOPHILS ABSOLUTE: 0 K/UL (ref 0–0.2)
BASOPHILS RELATIVE PERCENT: 0.2 % (ref 0–1)
BUN BLDV-MCNC: 11 MG/DL (ref 6–20)
CALCIUM SERPL-MCNC: 9.1 MG/DL (ref 8.6–10)
CHLORIDE BLD-SCNC: 99 MMOL/L (ref 98–111)
CO2: 30 MMOL/L (ref 22–29)
CREAT SERPL-MCNC: 0.5 MG/DL (ref 0.5–0.9)
EOSINOPHILS ABSOLUTE: 0 K/UL (ref 0–0.6)
EOSINOPHILS RELATIVE PERCENT: 0 % (ref 0–5)
GFR NON-AFRICAN AMERICAN: >60
GLUCOSE BLD-MCNC: 108 MG/DL (ref 74–109)
HCG(URINE) PREGNANCY TEST: NEGATIVE
HCT VFR BLD CALC: 39 % (ref 37–47)
HEMOGLOBIN: 12.4 G/DL (ref 12–16)
LYMPHOCYTES ABSOLUTE: 0.9 K/UL (ref 1.1–4.5)
LYMPHOCYTES RELATIVE PERCENT: 3.6 % (ref 20–40)
MCH RBC QN AUTO: 29.8 PG (ref 27–31)
MCHC RBC AUTO-ENTMCNC: 31.8 G/DL (ref 33–37)
MCV RBC AUTO: 93.8 FL (ref 81–99)
MONOCYTES ABSOLUTE: 0.7 K/UL (ref 0–0.9)
MONOCYTES RELATIVE PERCENT: 2.9 % (ref 0–10)
NEUTROPHILS ABSOLUTE: 22.3 K/UL (ref 1.5–7.5)
NEUTROPHILS RELATIVE PERCENT: 92.1 % (ref 50–65)
PDW BLD-RTO: 15.3 % (ref 11.5–14.5)
PLATELET # BLD: 418 K/UL (ref 130–400)
PMV BLD AUTO: 9.8 FL (ref 9.4–12.3)
POTASSIUM SERPL-SCNC: 5.1 MMOL/L (ref 3.5–4.9)
RBC # BLD: 4.16 M/UL (ref 4.2–5.4)
SODIUM BLD-SCNC: 140 MMOL/L (ref 136–145)
WBC # BLD: 24.1 K/UL (ref 4.8–10.8)

## 2018-01-19 PROCEDURE — 87070 CULTURE OTHR SPECIMN AEROBIC: CPT

## 2018-01-19 PROCEDURE — A6266 IMPREG GAUZE NO H20/SAL/YARD: HCPCS | Performed by: OBSTETRICS & GYNECOLOGY

## 2018-01-19 PROCEDURE — 96365 THER/PROPH/DIAG IV INF INIT: CPT

## 2018-01-19 PROCEDURE — 2720000001 HC MISC SURG SUPPLY STERILE $51-500: Performed by: OBSTETRICS & GYNECOLOGY

## 2018-01-19 PROCEDURE — 96375 TX/PRO/DX INJ NEW DRUG ADDON: CPT

## 2018-01-19 PROCEDURE — 3700000000 HC ANESTHESIA ATTENDED CARE: Performed by: OBSTETRICS & GYNECOLOGY

## 2018-01-19 PROCEDURE — 7100000001 HC PACU RECOVERY - ADDTL 15 MIN: Performed by: OBSTETRICS & GYNECOLOGY

## 2018-01-19 PROCEDURE — 2580000003 HC RX 258: Performed by: OBSTETRICS & GYNECOLOGY

## 2018-01-19 PROCEDURE — 3700000001 HC ADD 15 MINUTES (ANESTHESIA): Performed by: OBSTETRICS & GYNECOLOGY

## 2018-01-19 PROCEDURE — 3600000014 HC SURGERY LEVEL 4 ADDTL 15MIN: Performed by: OBSTETRICS & GYNECOLOGY

## 2018-01-19 PROCEDURE — 7100000000 HC PACU RECOVERY - FIRST 15 MIN: Performed by: OBSTETRICS & GYNECOLOGY

## 2018-01-19 PROCEDURE — 6360000002 HC RX W HCPCS

## 2018-01-19 PROCEDURE — 87186 SC STD MICRODIL/AGAR DIL: CPT

## 2018-01-19 PROCEDURE — 80048 BASIC METABOLIC PNL TOTAL CA: CPT

## 2018-01-19 PROCEDURE — 3600000004 HC SURGERY LEVEL 4 BASE: Performed by: OBSTETRICS & GYNECOLOGY

## 2018-01-19 PROCEDURE — 81025 URINE PREGNANCY TEST: CPT

## 2018-01-19 PROCEDURE — 6370000000 HC RX 637 (ALT 250 FOR IP): Performed by: OBSTETRICS & GYNECOLOGY

## 2018-01-19 PROCEDURE — 85025 COMPLETE CBC W/AUTO DIFF WBC: CPT

## 2018-01-19 PROCEDURE — 2500000003 HC RX 250 WO HCPCS

## 2018-01-19 PROCEDURE — 36415 COLL VENOUS BLD VENIPUNCTURE: CPT

## 2018-01-19 PROCEDURE — 87205 SMEAR GRAM STAIN: CPT

## 2018-01-19 PROCEDURE — G0378 HOSPITAL OBSERVATION PER HR: HCPCS

## 2018-01-19 PROCEDURE — 86403 PARTICLE AGGLUT ANTBDY SCRN: CPT

## 2018-01-19 PROCEDURE — 6360000002 HC RX W HCPCS: Performed by: OBSTETRICS & GYNECOLOGY

## 2018-01-19 RX ORDER — AMPICILLIN AND SULBACTAM 1; .5 G/1; G/1
INJECTION, POWDER, FOR SOLUTION INTRAMUSCULAR; INTRAVENOUS PRN
Status: DISCONTINUED | OUTPATIENT
Start: 2018-01-19 | End: 2018-01-19 | Stop reason: SDUPTHER

## 2018-01-19 RX ORDER — PROPOFOL 10 MG/ML
INJECTION, EMULSION INTRAVENOUS PRN
Status: DISCONTINUED | OUTPATIENT
Start: 2018-01-19 | End: 2018-01-19 | Stop reason: SDUPTHER

## 2018-01-19 RX ORDER — KETOROLAC TROMETHAMINE 30 MG/ML
30 INJECTION, SOLUTION INTRAMUSCULAR; INTRAVENOUS EVERY 6 HOURS
Status: DISCONTINUED | OUTPATIENT
Start: 2018-01-20 | End: 2018-01-20 | Stop reason: HOSPADM

## 2018-01-19 RX ORDER — PROMETHAZINE HYDROCHLORIDE 25 MG/ML
6.25 INJECTION, SOLUTION INTRAMUSCULAR; INTRAVENOUS
Status: DISCONTINUED | OUTPATIENT
Start: 2018-01-19 | End: 2018-01-19 | Stop reason: HOSPADM

## 2018-01-19 RX ORDER — METOCLOPRAMIDE HYDROCHLORIDE 5 MG/ML
10 INJECTION INTRAMUSCULAR; INTRAVENOUS
Status: DISCONTINUED | OUTPATIENT
Start: 2018-01-19 | End: 2018-01-19 | Stop reason: HOSPADM

## 2018-01-19 RX ORDER — MIDAZOLAM HYDROCHLORIDE 1 MG/ML
2 INJECTION INTRAMUSCULAR; INTRAVENOUS ONCE
Status: COMPLETED | OUTPATIENT
Start: 2018-01-19 | End: 2018-01-19

## 2018-01-19 RX ORDER — FENTANYL CITRATE 50 UG/ML
50 INJECTION, SOLUTION INTRAMUSCULAR; INTRAVENOUS
Status: DISCONTINUED | OUTPATIENT
Start: 2018-01-19 | End: 2018-01-19 | Stop reason: HOSPADM

## 2018-01-19 RX ORDER — SCOLOPAMINE TRANSDERMAL SYSTEM 1 MG/1
1 PATCH, EXTENDED RELEASE TRANSDERMAL ONCE
Status: DISCONTINUED | OUTPATIENT
Start: 2018-01-19 | End: 2018-01-19 | Stop reason: HOSPADM

## 2018-01-19 RX ORDER — KETOROLAC TROMETHAMINE 30 MG/ML
30 INJECTION, SOLUTION INTRAMUSCULAR; INTRAVENOUS ONCE
Status: COMPLETED | OUTPATIENT
Start: 2018-01-19 | End: 2018-01-19

## 2018-01-19 RX ORDER — MORPHINE SULFATE 4 MG/ML
4 INJECTION, SOLUTION INTRAMUSCULAR; INTRAVENOUS
Status: DISCONTINUED | OUTPATIENT
Start: 2018-01-19 | End: 2018-01-20 | Stop reason: HOSPADM

## 2018-01-19 RX ORDER — ENALAPRILAT 2.5 MG/2ML
1.25 INJECTION INTRAVENOUS
Status: DISCONTINUED | OUTPATIENT
Start: 2018-01-19 | End: 2018-01-19 | Stop reason: HOSPADM

## 2018-01-19 RX ORDER — MEPERIDINE HYDROCHLORIDE 50 MG/ML
12.5 INJECTION INTRAMUSCULAR; INTRAVENOUS; SUBCUTANEOUS EVERY 5 MIN PRN
Status: DISCONTINUED | OUTPATIENT
Start: 2018-01-19 | End: 2018-01-19 | Stop reason: HOSPADM

## 2018-01-19 RX ORDER — SODIUM CHLORIDE 0.9 % (FLUSH) 0.9 %
10 SYRINGE (ML) INJECTION PRN
Status: DISCONTINUED | OUTPATIENT
Start: 2018-01-19 | End: 2018-01-19 | Stop reason: HOSPADM

## 2018-01-19 RX ORDER — LIDOCAINE HYDROCHLORIDE 10 MG/ML
1 INJECTION, SOLUTION EPIDURAL; INFILTRATION; INTRACAUDAL; PERINEURAL
Status: DISCONTINUED | OUTPATIENT
Start: 2018-01-19 | End: 2018-01-19 | Stop reason: HOSPADM

## 2018-01-19 RX ORDER — FENTANYL CITRATE 50 UG/ML
INJECTION, SOLUTION INTRAMUSCULAR; INTRAVENOUS PRN
Status: DISCONTINUED | OUTPATIENT
Start: 2018-01-19 | End: 2018-01-19 | Stop reason: SDUPTHER

## 2018-01-19 RX ORDER — HYDROMORPHONE HCL 110MG/55ML
0.25 PATIENT CONTROLLED ANALGESIA SYRINGE INTRAVENOUS EVERY 5 MIN PRN
Status: DISCONTINUED | OUTPATIENT
Start: 2018-01-19 | End: 2018-01-19 | Stop reason: HOSPADM

## 2018-01-19 RX ORDER — SODIUM CHLORIDE 0.9 % (FLUSH) 0.9 %
10 SYRINGE (ML) INJECTION PRN
Status: DISCONTINUED | OUTPATIENT
Start: 2018-01-19 | End: 2018-01-20 | Stop reason: HOSPADM

## 2018-01-19 RX ORDER — DEXAMETHASONE SODIUM PHOSPHATE 10 MG/ML
INJECTION INTRAMUSCULAR; INTRAVENOUS PRN
Status: DISCONTINUED | OUTPATIENT
Start: 2018-01-19 | End: 2018-01-19 | Stop reason: SDUPTHER

## 2018-01-19 RX ORDER — HYDROMORPHONE HCL 110MG/55ML
0.5 PATIENT CONTROLLED ANALGESIA SYRINGE INTRAVENOUS EVERY 5 MIN PRN
Status: DISCONTINUED | OUTPATIENT
Start: 2018-01-19 | End: 2018-01-19 | Stop reason: HOSPADM

## 2018-01-19 RX ORDER — MORPHINE SULFATE 4 MG/ML
4 INJECTION, SOLUTION INTRAMUSCULAR; INTRAVENOUS
Status: DISCONTINUED | OUTPATIENT
Start: 2018-01-19 | End: 2018-01-19 | Stop reason: HOSPADM

## 2018-01-19 RX ORDER — MIDAZOLAM HYDROCHLORIDE 1 MG/ML
INJECTION INTRAMUSCULAR; INTRAVENOUS
Status: COMPLETED
Start: 2018-01-19 | End: 2018-01-19

## 2018-01-19 RX ORDER — AMPICILLIN AND SULBACTAM 1; .5 G/1; G/1
3 INJECTION, POWDER, FOR SOLUTION INTRAMUSCULAR; INTRAVENOUS EVERY 6 HOURS
Status: DISCONTINUED | OUTPATIENT
Start: 2018-01-19 | End: 2018-01-19

## 2018-01-19 RX ORDER — OXYCODONE HYDROCHLORIDE AND ACETAMINOPHEN 5; 325 MG/1; MG/1
1 TABLET ORAL EVERY 4 HOURS PRN
Status: DISCONTINUED | OUTPATIENT
Start: 2018-01-19 | End: 2018-01-20 | Stop reason: HOSPADM

## 2018-01-19 RX ORDER — MORPHINE SULFATE 4 MG/ML
2 INJECTION, SOLUTION INTRAMUSCULAR; INTRAVENOUS EVERY 5 MIN PRN
Status: DISCONTINUED | OUTPATIENT
Start: 2018-01-19 | End: 2018-01-19 | Stop reason: HOSPADM

## 2018-01-19 RX ORDER — LABETALOL HYDROCHLORIDE 5 MG/ML
5 INJECTION, SOLUTION INTRAVENOUS EVERY 10 MIN PRN
Status: DISCONTINUED | OUTPATIENT
Start: 2018-01-19 | End: 2018-01-19 | Stop reason: HOSPADM

## 2018-01-19 RX ORDER — KETOROLAC TROMETHAMINE 30 MG/ML
INJECTION, SOLUTION INTRAMUSCULAR; INTRAVENOUS
Status: COMPLETED
Start: 2018-01-19 | End: 2018-01-19

## 2018-01-19 RX ORDER — SODIUM CHLORIDE 0.9 % (FLUSH) 0.9 %
10 SYRINGE (ML) INJECTION EVERY 12 HOURS SCHEDULED
Status: DISCONTINUED | OUTPATIENT
Start: 2018-01-19 | End: 2018-01-20 | Stop reason: HOSPADM

## 2018-01-19 RX ORDER — ACETAMINOPHEN 325 MG/1
650 TABLET ORAL EVERY 4 HOURS PRN
Status: DISCONTINUED | OUTPATIENT
Start: 2018-01-19 | End: 2018-01-20 | Stop reason: HOSPADM

## 2018-01-19 RX ORDER — ONDANSETRON 2 MG/ML
INJECTION INTRAMUSCULAR; INTRAVENOUS PRN
Status: DISCONTINUED | OUTPATIENT
Start: 2018-01-19 | End: 2018-01-19 | Stop reason: SDUPTHER

## 2018-01-19 RX ORDER — DIPHENHYDRAMINE HYDROCHLORIDE 50 MG/ML
12.5 INJECTION INTRAMUSCULAR; INTRAVENOUS
Status: DISCONTINUED | OUTPATIENT
Start: 2018-01-19 | End: 2018-01-19 | Stop reason: HOSPADM

## 2018-01-19 RX ORDER — DOCUSATE SODIUM 100 MG/1
100 CAPSULE, LIQUID FILLED ORAL 2 TIMES DAILY
Status: DISCONTINUED | OUTPATIENT
Start: 2018-01-19 | End: 2018-01-20 | Stop reason: HOSPADM

## 2018-01-19 RX ORDER — SODIUM CHLORIDE, SODIUM LACTATE, POTASSIUM CHLORIDE, CALCIUM CHLORIDE 600; 310; 30; 20 MG/100ML; MG/100ML; MG/100ML; MG/100ML
INJECTION, SOLUTION INTRAVENOUS CONTINUOUS
Status: DISCONTINUED | OUTPATIENT
Start: 2018-01-19 | End: 2018-01-20 | Stop reason: HOSPADM

## 2018-01-19 RX ORDER — MORPHINE SULFATE 4 MG/ML
2 INJECTION, SOLUTION INTRAMUSCULAR; INTRAVENOUS
Status: DISCONTINUED | OUTPATIENT
Start: 2018-01-19 | End: 2018-01-20 | Stop reason: HOSPADM

## 2018-01-19 RX ORDER — HYDRALAZINE HYDROCHLORIDE 20 MG/ML
5 INJECTION INTRAMUSCULAR; INTRAVENOUS EVERY 10 MIN PRN
Status: DISCONTINUED | OUTPATIENT
Start: 2018-01-19 | End: 2018-01-19 | Stop reason: HOSPADM

## 2018-01-19 RX ORDER — ONDANSETRON 2 MG/ML
4 INJECTION INTRAMUSCULAR; INTRAVENOUS EVERY 6 HOURS PRN
Status: DISCONTINUED | OUTPATIENT
Start: 2018-01-19 | End: 2018-01-20 | Stop reason: HOSPADM

## 2018-01-19 RX ORDER — MORPHINE SULFATE 4 MG/ML
4 INJECTION, SOLUTION INTRAMUSCULAR; INTRAVENOUS EVERY 5 MIN PRN
Status: DISCONTINUED | OUTPATIENT
Start: 2018-01-19 | End: 2018-01-19 | Stop reason: HOSPADM

## 2018-01-19 RX ORDER — LIDOCAINE HYDROCHLORIDE 10 MG/ML
INJECTION, SOLUTION EPIDURAL; INFILTRATION; INTRACAUDAL; PERINEURAL PRN
Status: DISCONTINUED | OUTPATIENT
Start: 2018-01-19 | End: 2018-01-19 | Stop reason: SDUPTHER

## 2018-01-19 RX ORDER — MIDAZOLAM HYDROCHLORIDE 1 MG/ML
2 INJECTION INTRAMUSCULAR; INTRAVENOUS
Status: DISCONTINUED | OUTPATIENT
Start: 2018-01-19 | End: 2018-01-19 | Stop reason: HOSPADM

## 2018-01-19 RX ORDER — SODIUM CHLORIDE 0.9 % (FLUSH) 0.9 %
10 SYRINGE (ML) INJECTION EVERY 12 HOURS SCHEDULED
Status: DISCONTINUED | OUTPATIENT
Start: 2018-01-19 | End: 2018-01-19 | Stop reason: HOSPADM

## 2018-01-19 RX ORDER — MIDAZOLAM HYDROCHLORIDE 1 MG/ML
INJECTION INTRAMUSCULAR; INTRAVENOUS PRN
Status: DISCONTINUED | OUTPATIENT
Start: 2018-01-19 | End: 2018-01-19 | Stop reason: SDUPTHER

## 2018-01-19 RX ADMIN — MIDAZOLAM 2 MG: 1 INJECTION INTRAMUSCULAR; INTRAVENOUS at 15:46

## 2018-01-19 RX ADMIN — OXYCODONE HYDROCHLORIDE AND ACETAMINOPHEN 1 TABLET: 5; 325 TABLET ORAL at 20:31

## 2018-01-19 RX ADMIN — KETOROLAC TROMETHAMINE: 30 INJECTION, SOLUTION INTRAMUSCULAR at 21:59

## 2018-01-19 RX ADMIN — ONDANSETRON HYDROCHLORIDE 4 MG: 2 SOLUTION INTRAMUSCULAR; INTRAVENOUS at 18:49

## 2018-01-19 RX ADMIN — FENTANYL CITRATE 50 MCG: 50 INJECTION, SOLUTION INTRAMUSCULAR; INTRAVENOUS at 18:33

## 2018-01-19 RX ADMIN — DOCUSATE SODIUM 100 MG: 100 CAPSULE, LIQUID FILLED ORAL at 20:31

## 2018-01-19 RX ADMIN — SODIUM CHLORIDE, SODIUM LACTATE, POTASSIUM CHLORIDE, AND CALCIUM CHLORIDE: 600; 310; 30; 20 INJECTION, SOLUTION INTRAVENOUS at 12:58

## 2018-01-19 RX ADMIN — SODIUM CHLORIDE, SODIUM LACTATE, POTASSIUM CHLORIDE, AND CALCIUM CHLORIDE: 600; 310; 30; 20 INJECTION, SOLUTION INTRAVENOUS at 20:31

## 2018-01-19 RX ADMIN — SODIUM CHLORIDE 3 G: 900 INJECTION INTRAVENOUS at 19:23

## 2018-01-19 RX ADMIN — LIDOCAINE HYDROCHLORIDE 50 MG: 10 INJECTION, SOLUTION EPIDURAL; INFILTRATION; INTRACAUDAL; PERINEURAL at 18:33

## 2018-01-19 RX ADMIN — DEXAMETHASONE SODIUM PHOSPHATE 10 MG: 10 INJECTION INTRAMUSCULAR; INTRAVENOUS at 18:38

## 2018-01-19 RX ADMIN — PROPOFOL 120 MG: 10 INJECTION, EMULSION INTRAVENOUS at 18:33

## 2018-01-19 RX ADMIN — FENTANYL CITRATE 50 MCG: 50 INJECTION, SOLUTION INTRAMUSCULAR; INTRAVENOUS at 18:47

## 2018-01-19 RX ADMIN — KETOROLAC TROMETHAMINE 30 MG: 30 INJECTION, SOLUTION INTRAMUSCULAR at 19:14

## 2018-01-19 RX ADMIN — MIDAZOLAM HYDROCHLORIDE 2 MG: 1 INJECTION INTRAMUSCULAR; INTRAVENOUS at 15:46

## 2018-01-19 RX ADMIN — SODIUM CHLORIDE, SODIUM LACTATE, POTASSIUM CHLORIDE, AND CALCIUM CHLORIDE: 600; 310; 30; 20 INJECTION, SOLUTION INTRAVENOUS at 18:45

## 2018-01-19 RX ADMIN — MIDAZOLAM HYDROCHLORIDE 2 MG: 1 INJECTION, SOLUTION INTRAMUSCULAR; INTRAVENOUS at 18:26

## 2018-01-19 ASSESSMENT — PAIN SCALES - GENERAL
PAINLEVEL_OUTOF10: 0
PAINLEVEL_OUTOF10: 5
PAINLEVEL_OUTOF10: 3
PAINLEVEL_OUTOF10: 7
PAINLEVEL_OUTOF10: 0
PAINLEVEL_OUTOF10: 0

## 2018-01-19 NOTE — ANESTHESIA PRE PROCEDURE
Department of Anesthesiology  Preprocedure Note       Name:  Sharon Jordan   Age:  27 y.o.  :  1985                                          MRN:  [id number]         Date:  2018      Surgeon: Joann Hannah):  Tacy Fabry, MD    Procedure: Procedure(s):  VULVA INCISION AND DRAINAGE    Medications prior to admission:   Prior to Admission medications    Medication Sig Start Date End Date Taking? Authorizing Provider   docusate sodium (COLACE) 100 MG capsule Take 100 mg by mouth 2 times daily   Yes Historical Provider, MD   predniSONE (DELTASONE) 2.5 MG tablet Take 2.5 mg by mouth 4 times daily   Yes Historical Provider, MD   pantoprazole sodium (PROTONIX) 40 MG PACK packet Take 40 mg by mouth every morning (before breakfast)   Yes Historical Provider, MD   oxyCODONE-acetaminophen (PERCOCET) 7.5-325 MG per tablet Take 1 tablet by mouth every 6 hours as needed for Pain. Yes Historical Provider, MD   Saccharomyces boulardii (PROBIOTIC) 250 MG CAPS Take 1 tablet by mouth daily    Yes Historical Provider, MD   adalimumab (HUMIRA) 40 MG/0.8ML injection Inject 40 mg into the skin every 14 days    Yes Historical Provider, MD   mesalamine (PENTASA) 500 MG extended release capsule Take 500 mg by mouth 3 times daily    Yes Historical Provider, MD       Current medications:    Current Facility-Administered Medications   Medication Dose Route Frequency Provider Last Rate Last Dose    lactated ringers infusion   Intravenous Continuous Tacy Fabry,  mL/hr at 18 1258         Allergies: Allergies   Allergen Reactions    Mucinex [Guaifenesin Er] Hives       Problem List:  There is no problem list on file for this patient.       Past Medical History:        Diagnosis Date    Crohn disease ([address]     GERD (gastroesophageal reflux disease)        Past Surgical History:        Procedure Laterality Date    AV FISTULA REPAIR       SECTION      DILATION AND CURETTAGE OF UTERUS      TUBAL LIGATION

## 2018-01-20 VITALS
HEIGHT: 60 IN | RESPIRATION RATE: 16 BRPM | HEART RATE: 76 BPM | DIASTOLIC BLOOD PRESSURE: 65 MMHG | TEMPERATURE: 98.5 F | OXYGEN SATURATION: 98 % | BODY MASS INDEX: 26.31 KG/M2 | WEIGHT: 134 LBS | SYSTOLIC BLOOD PRESSURE: 105 MMHG

## 2018-01-20 PROBLEM — N76.4 ABSCESS OF VULVA: Status: RESOLVED | Noted: 2018-01-19 | Resolved: 2018-01-20

## 2018-01-20 PROBLEM — N76.4 VULVAR ABSCESS: Status: RESOLVED | Noted: 2018-01-19 | Resolved: 2018-01-20

## 2018-01-20 PROCEDURE — 6370000000 HC RX 637 (ALT 250 FOR IP): Performed by: OBSTETRICS & GYNECOLOGY

## 2018-01-20 PROCEDURE — 96376 TX/PRO/DX INJ SAME DRUG ADON: CPT

## 2018-01-20 PROCEDURE — G0378 HOSPITAL OBSERVATION PER HR: HCPCS

## 2018-01-20 PROCEDURE — 96366 THER/PROPH/DIAG IV INF ADDON: CPT

## 2018-01-20 PROCEDURE — 2580000003 HC RX 258: Performed by: OBSTETRICS & GYNECOLOGY

## 2018-01-20 PROCEDURE — 6360000002 HC RX W HCPCS: Performed by: OBSTETRICS & GYNECOLOGY

## 2018-01-20 RX ORDER — AMOXICILLIN AND CLAVULANATE POTASSIUM 875; 125 MG/1; MG/1
1 TABLET, FILM COATED ORAL 2 TIMES DAILY
Qty: 12 TABLET | Refills: 0 | Status: SHIPPED | OUTPATIENT
Start: 2018-01-20 | End: 2018-01-30

## 2018-01-20 RX ADMIN — SODIUM CHLORIDE 3 G: 900 INJECTION INTRAVENOUS at 03:18

## 2018-01-20 RX ADMIN — OXYCODONE HYDROCHLORIDE AND ACETAMINOPHEN 1 TABLET: 5; 325 TABLET ORAL at 01:16

## 2018-01-20 RX ADMIN — SODIUM CHLORIDE 3 G: 900 INJECTION INTRAVENOUS at 11:37

## 2018-01-20 RX ADMIN — KETOROLAC TROMETHAMINE 30 MG: 30 INJECTION, SOLUTION INTRAMUSCULAR at 01:23

## 2018-01-20 RX ADMIN — OXYCODONE HYDROCHLORIDE AND ACETAMINOPHEN 1 TABLET: 5; 325 TABLET ORAL at 06:07

## 2018-01-20 ASSESSMENT — PAIN SCALES - GENERAL
PAINLEVEL_OUTOF10: 4
PAINLEVEL_OUTOF10: 5
PAINLEVEL_OUTOF10: 5

## 2018-01-22 ENCOUNTER — OFFICE VISIT (OUTPATIENT)
Dept: GASTROENTEROLOGY | Facility: CLINIC | Age: 33
End: 2018-01-22

## 2018-01-22 VITALS
SYSTOLIC BLOOD PRESSURE: 110 MMHG | BODY MASS INDEX: 26.9 KG/M2 | HEIGHT: 60 IN | DIASTOLIC BLOOD PRESSURE: 72 MMHG | WEIGHT: 137 LBS | OXYGEN SATURATION: 100 % | TEMPERATURE: 97.3 F | HEART RATE: 83 BPM

## 2018-01-22 DIAGNOSIS — K50.012 CROHN'S DISEASE OF SMALL INTESTINE WITH INTESTINAL OBSTRUCTION (HCC): Primary | ICD-10-CM

## 2018-01-22 PROCEDURE — 99213 OFFICE O/P EST LOW 20 MIN: CPT | Performed by: INTERNAL MEDICINE

## 2018-01-22 RX ORDER — ADALIMUMAB 40MG/0.8ML
KIT SUBCUTANEOUS
Refills: 0 | COMMUNITY
Start: 2018-01-02

## 2018-01-22 RX ORDER — PREDNISONE 10 MG/1
10 TABLET ORAL DAILY
COMMUNITY
End: 2020-09-20

## 2018-01-22 RX ORDER — OXYCODONE AND ACETAMINOPHEN 7.5; 325 MG/1; MG/1
1 TABLET ORAL EVERY 6 HOURS PRN
COMMUNITY
End: 2020-09-20

## 2018-01-22 RX ORDER — LACTOBACILLUS ACIDOPHILUS / LACTOBACILLUS BULGARICUS 100 MILLION CFU STRENGTH
GRANULES ORAL DAILY
COMMUNITY
End: 2020-09-20

## 2018-01-22 RX ORDER — AMOXICILLIN AND CLAVULANATE POTASSIUM 875; 125 MG/1; MG/1
1 TABLET, FILM COATED ORAL 2 TIMES DAILY
COMMUNITY
End: 2020-09-20

## 2018-01-22 RX ORDER — PANTOPRAZOLE SODIUM 40 MG/1
40 TABLET, DELAYED RELEASE ORAL DAILY
COMMUNITY
End: 2020-09-20

## 2018-01-22 NOTE — ASSESSMENT & PLAN NOTE
Patient will continue prednisone taper as well as mesalamine.  She will continue with Humira 40 mg subcutaneously every other week.  If this does not improve symptomatology, then we will need to make referral for surgical evaluation as this is been problematic issue for her recently.

## 2018-01-22 NOTE — PROGRESS NOTES
Primary Physician: Alfred Lawrence MD    Chief Complaint   Patient presents with   • Follow-up     Patient is here today for follow up with crohns. She was started on Humira.       Gina Carmona is a 32 y.o. female.    HPI   Crohn's disease  12/12/2017  This is a very pleasant 32-year-old female who has a history of Crohn's disease diagnosed in 2002 after a perirectal abscess.  The patient was most recently admitted to the hospital and is here for follow-up today.  According to records the patient was admitted to the hospital on 11/15/17 with complaints of nausea, vomiting, abdominal pain and abdominal distention..  CT of the abdomen and pelvis was performed that revealed wall thickening of the distal ileum extending all the way to the ileocecal valve, small bowel dilated ox multiple area of thickening and measured up to 4.6 cm in diameter, gastric distention with fluid noted.  The patient was treated for bowel obstruction.  She had had a history of a small bowel obstruction treated conservatively at [facility] on September 2017.  Prior to that she stated she had had no significant trouble with her Crohn's disease.     GI was consult did and it was felt that she had a distal small bowel obstruction secondary to active Crohn's disease.  Possibly could be fibrotic disease from her Crohn's.  IV steroids were initiated the patient was kept nothing by mouth and her symptoms resolved.  The patient states that she has continued on a low residue diet since discharge and has had no more symptoms.  Different modalities of treatment for her Crohn's was discussed with the patient.  An acute panel for hepatitis A, B, C were found to be negative.  WBC was 6.63.  CMP was unremarkable.  A T spot was found to be negative.  The patient has a follow-up appointment with Dr. Solis on 1/22/18 to discuss possible Biologics.     She denies any nausea, vomiting, epigastric pain, dysphagia or hematemesis.  She denies  "any fever or chills.  She denies any abdominal pain.  She denies any melena or hematochezia.  She denies any constipation or diarrhea.  She denies any unintentional weight loss or loss of appetite.    01/22/18  She has had ileal inflammation resulting in obstructive symptoms basically since November 2017 when she was hospitalized.  She was placed on Entocort.  We also started making arrangements for possible biological agents.  It was our plan to start Humira.  Her T spot was negative.  Hepatitis A, B, and C panel was negative.  These were all done in November 2017.    I received a phone call from [facility] emergency room about 2 weeks ago.  They stated that she was back in the emergency room with obstructive symptoms.  I offered to have her transferred over here to [facility].  She ultimately opted to stay at that facility.  I made a suggestion to the emergency room doctor that she be evaluated by surgery as she has been failing medical therapy.    She tells me that she was admitted and was given IV fluids and steroids.  Further transferred to [place] for surgical evaluation versus monitoring her response since she had just started on the Humira a day or so before this event occurred.  This flare was not as severe as it has been in the past.  She did not have the nausea and vomiting that she has had in the past with these events.  Her first dose of Humira was on January 4.  She had her second dose a few days ago.  She had a sense of stomach \"fullness\" this am.  No N/V today.      She had finished the course of Entocort that I started for her after the hospital stay.  With this most recent hospitalization in [place], they placed her on prednisone.  She was receiving 50 mg daily and was advised to decrease by 10 mg every week.  She is currently on 40 mg daily.    Past Medical History:   Diagnosis Date   • Crohn's disease        Past Surgical History:   Procedure Laterality Date   • ANAL FISTULA " REPAIR     •  SECTION     • COLONOSCOPY  2016   • DILATATION AND CURETTAGE          Current Outpatient Prescriptions:   •  amoxicillin-clavulanate (AUGMENTIN) 875-125 MG per tablet, Take 1 tablet by mouth 2 (Two) Times a Day., Disp: , Rfl:   •  HUMIRA PEN-CROHNS STARTER 40 MG/0.8ML Pen-injector Kit, INJECT 4 PENS SUBCUTANEOUSLY ON DAY 1, THEN 2 PENS ON DAY 15, Disp: , Rfl: 0  •  Lactobacillus (FLORANEX) pack oral packet, Take  by mouth Daily., Disp: , Rfl:   •  mesalamine (PENTASA) 500 MG CR capsule, Take 2 capsules by mouth 3 (Three) Times a Day., Disp: 180 capsule, Rfl: 5  •  Multiple Vitamins-Minerals (MULTIVITAMIN WITH MINERALS) tablet tablet, Take 1 tablet by mouth Every Night., Disp: , Rfl:   •  oxyCODONE-acetaminophen (PERCOCET) 7.5-325 MG per tablet, Take 1 tablet by mouth Every 6 (Six) Hours As Needed., Disp: , Rfl:   •  pantoprazole (PROTONIX) 40 MG EC tablet, Take 40 mg by mouth Daily., Disp: , Rfl:   •  predniSONE (DELTASONE) 10 MG tablet, Take 10 mg by mouth Daily., Disp: , Rfl:     Allergies   Allergen Reactions   • Guaifenesin Er Hives       Social History     Social History   • Marital status:      Spouse name: N/A   • Number of children: N/A   • Years of education: N/A     Occupational History   • Not on file.     Social History Main Topics   • Smoking status: Never Smoker   • Smokeless tobacco: Never Used   • Alcohol use No   • Drug use: Not on file   • Sexual activity: Not on file     Other Topics Concern   • Not on file     Social History Narrative       Family History   Problem Relation Age of Onset   • Colon cancer Neg Hx    • Colon polyps Neg Hx        Review of Systems   Constitutional: Negative for fever.   Respiratory: Negative for cough and shortness of breath.    Cardiovascular: Negative for chest pain.   Genitourinary: Negative for dysuria.   Skin: Negative for rash.   Neurological: Negative for seizures.       Objective     /72  Pulse 83  Temp 97.3 °F (36.3  "°C)  Ht 152.4 cm (60\")  Wt 62.1 kg (137 lb)  SpO2 100%  BMI 26.76 kg/m2    Physical Exam   Constitutional: She is oriented to person, place, and time. She appears well-developed.   Eyes: No scleral icterus.   Cardiovascular: Normal rate and regular rhythm.    Pulmonary/Chest: Breath sounds normal.   Abdominal: Soft. Bowel sounds are normal. She exhibits no distension and no mass. There is no tenderness. There is no rebound and no guarding.   Musculoskeletal: Normal range of motion.   Neurological: She is alert and oriented to person, place, and time.   Skin: No rash noted.   Psychiatric: She has a normal mood and affect. Her behavior is normal.   Vitals reviewed.      Lab Results   Component Value Date    WBC 13.17 (H) 12/20/2017    WBC 18.95 (H) 12/19/2017    WBC 6.63 11/17/2017    HGB 10.9 (L) 12/20/2017    HGB 13.4 12/19/2017    HGB 9.4 (L) 11/17/2017    HCT 33.8 (L) 12/20/2017    HCT 41.8 12/19/2017    HCT 29.2 (L) 11/17/2017     12/20/2017     (H) 12/19/2017     11/17/2017        Lab Results   Component Value Date     12/20/2017     12/19/2017     11/17/2017    K 4.1 12/20/2017    K 3.9 12/19/2017    K 3.9 11/17/2017     12/20/2017     12/19/2017     11/17/2017    CO2 22.0 (L) 12/20/2017    CO2 25.0 12/19/2017    CO2 28.0 11/17/2017    BUN 10 12/20/2017    BUN 10 12/19/2017    BUN 10 11/17/2017    CREATININE 0.51 12/20/2017    CREATININE 0.69 12/19/2017    CREATININE 0.67 11/17/2017    BILITOT 0.3 12/19/2017    BILITOT 0.4 11/14/2017    BILITOT 0.2 08/11/2014    ALKPHOS 122 (H) 12/19/2017    ALKPHOS 108 11/14/2017    ALKPHOS 86 08/11/2014    ALT 34 12/19/2017    ALT 37 11/14/2017    ALT 26 08/11/2014    AST 25 12/19/2017    AST 34 11/14/2017    AST 30 08/11/2014    GLUCOSE 119 (H) 12/20/2017    GLUCOSE 102 (H) 12/19/2017    GLUCOSE 94 11/17/2017       No results found for: INR    IMPRESSION/PLAN:    Assessment/Plan      Problem List Items Addressed " This Visit        Digestive    Crohn's disease of small intestine - Primary    Overview     She has had Crohn's disease since 2002.  Since at least November 2017, she has been having difficulties with ileitis in small bowel obstructions.  T-spot neg 11/2017.  Appetite is A, B, and C testing were also negative at that time.  She was started on Entocort 9 mg daily in Nov 2017.  He completed this course and had a return in symptoms in January 2018.  She was placed on prednisone by [facility] starting at 50 mg a day to be decreased by 10 mg every week.  Humira was started January 4.  She has been doing the load appropriately.  She is a nonsmoker.         Current Assessment & Plan     Patient will continue prednisone taper as well as mesalamine.  She will continue with Humira 40 mg subcutaneously every other week.  If this does not improve symptomatology, then we will need to make referral for surgical evaluation as this is been problematic issue for her recently.                 RTC 4 months      Zehra Solis MD  01/22/18  3:10 PM    Much of this encounter note is an electronic transcription/translation of spoken language to printed text. The electronic translation of spoken language may permit erroneous, or at times, nonsensical words or phrases to be inadvertently transcribed; although I have reviewed the note for such errors, some may still exist.

## 2018-01-23 LAB
ANAEROBIC CULTURE: ABNORMAL
CULTURE SURGICAL: ABNORMAL
GRAM STAIN RESULT: ABNORMAL
ORGANISM: ABNORMAL

## 2018-02-05 ENCOUNTER — TELEPHONE (OUTPATIENT)
Dept: GASTROENTEROLOGY | Facility: CLINIC | Age: 33
End: 2018-02-05

## 2018-02-05 NOTE — TELEPHONE ENCOUNTER
[facility] does not accept any form of medicaid insurance. I have let Merlyn at Dr. Lawrence's office know this. She will let the patient know to check with her medicaid insurance to find out who she can see and to let me know and I will refer her.

## 2018-02-05 NOTE — TELEPHONE ENCOUNTER
Elly from Dr. Lawrence's office called stating that patient is in the hospital in [place]. Elly Cormier, DIONNA states that patient said she needs a referral to [facility]?

## 2018-02-13 ENCOUNTER — TELEPHONE (OUTPATIENT)
Dept: GASTROENTEROLOGY | Facility: CLINIC | Age: 33
End: 2018-02-13

## 2018-02-13 NOTE — TELEPHONE ENCOUNTER
Patient called and states that since she lives in [place] and the hospital is 5 min from her house she is going to continue care with gastro there.

## 2018-02-14 NOTE — TELEPHONE ENCOUNTER
I understand.  She has been admitted there many times recently.  Please remove from any recalls at her request.    Zehra Solis MD

## 2018-09-27 RX ORDER — MESALAMINE 500 MG/1
CAPSULE ORAL
Qty: 270 CAPSULE | Refills: 3 | Status: SHIPPED | OUTPATIENT
Start: 2018-09-27

## 2018-12-10 ENCOUNTER — TRANSCRIBE ORDERS (OUTPATIENT)
Dept: ADMINISTRATIVE | Facility: HOSPITAL | Age: 33
End: 2018-12-10

## 2018-12-10 DIAGNOSIS — Z12.31 ENCOUNTER FOR SCREENING MAMMOGRAM FOR MALIGNANT NEOPLASM OF BREAST: Primary | ICD-10-CM

## 2018-12-11 ENCOUNTER — HOSPITAL ENCOUNTER (OUTPATIENT)
Dept: WOMENS IMAGING | Age: 33
Discharge: HOME | End: 2018-12-11
Payer: COMMERCIAL

## 2018-12-11 DIAGNOSIS — Z12.31 ENCOUNTER FOR SCREENING MAMMOGRAM FOR MALIGNANT NEOPLASM OF BREAST: ICD-10-CM

## 2018-12-11 PROCEDURE — 77063 BREAST TOMOSYNTHESIS BI: CPT

## 2018-12-13 ENCOUNTER — APPOINTMENT (OUTPATIENT)
Dept: MAMMOGRAPHY | Facility: HOSPITAL | Age: 33
End: 2018-12-13
Attending: OBSTETRICS & GYNECOLOGY

## 2018-12-14 ENCOUNTER — TELEPHONE (OUTPATIENT)
Dept: WOMENS IMAGING | Age: 33
End: 2018-12-14

## 2019-01-02 ENCOUNTER — HOSPITAL ENCOUNTER (OUTPATIENT)
Dept: WOMENS IMAGING | Age: 34
Discharge: HOME | End: 2019-01-02
Payer: COMMERCIAL

## 2019-01-02 ENCOUNTER — HOSPITAL ENCOUNTER (OUTPATIENT)
Dept: ULTRASOUND IMAGING | Age: 34
Discharge: HOME | End: 2019-01-02
Payer: COMMERCIAL

## 2019-01-02 DIAGNOSIS — N63.0 LUMP OR MASS IN BREAST: ICD-10-CM

## 2019-01-02 DIAGNOSIS — R92.2 BREAST DENSITY: ICD-10-CM

## 2019-01-02 PROCEDURE — G0279 TOMOSYNTHESIS, MAMMO: HCPCS

## 2019-01-02 PROCEDURE — 76642 ULTRASOUND BREAST LIMITED: CPT

## 2019-06-24 ENCOUNTER — HOSPITAL ENCOUNTER (OUTPATIENT)
Age: 34
Setting detail: SPECIMEN
Discharge: HOME | End: 2019-06-24
Payer: COMMERCIAL

## 2019-06-24 ENCOUNTER — HOSPITAL ENCOUNTER (OUTPATIENT)
Dept: WOMENS IMAGING | Age: 34
Discharge: HOME | End: 2019-06-24
Payer: COMMERCIAL

## 2019-06-24 DIAGNOSIS — R92.2 BREAST DENSITY: ICD-10-CM

## 2019-06-24 LAB — HEREDITARY CANCER TEST-MYRIAD: NORMAL

## 2019-06-24 PROCEDURE — 36415 COLL VENOUS BLD VENIPUNCTURE: CPT

## 2019-06-24 PROCEDURE — 76642 ULTRASOUND BREAST LIMITED: CPT

## 2019-08-15 ENCOUNTER — HOSPITAL ENCOUNTER (OUTPATIENT)
Dept: WOMENS IMAGING | Age: 34
Discharge: HOME | End: 2019-08-15
Payer: COMMERCIAL

## 2020-09-20 PROCEDURE — U0003 INFECTIOUS AGENT DETECTION BY NUCLEIC ACID (DNA OR RNA); SEVERE ACUTE RESPIRATORY SYNDROME CORONAVIRUS 2 (SARS-COV-2) (CORONAVIRUS DISEASE [COVID-19]), AMPLIFIED PROBE TECHNIQUE, MAKING USE OF HIGH THROUGHPUT TECHNOLOGIES AS DESCRIBED BY CMS-2020-01-R: HCPCS | Performed by: FAMILY MEDICINE

## 2020-09-20 PROCEDURE — 87081 CULTURE SCREEN ONLY: CPT | Performed by: FAMILY MEDICINE

## 2022-05-11 NOTE — PROGRESS NOTES
AUDIOMETRIC EVALUATION      Name:  Dhara Carmona  :  1985  Age:  36 y.o.  Date of Evaluation:  2022       History:  Reason for visit:  Ms. Carmona is seen today at the request of Braxton Elias MD for a hearing evaluation. Patient was referred to ENT clinic for dizziness. Patient reports having vertigo recently which lasts a few days. This began in 2022. She also states her ears have been hurting and she has noticed she has to ask her children to repeat themselves often. Patient has not had her hearing tested recently.    PE Tubes:  no, both ears   Other otologic surgical history: no, both ears  Tinnitus:  no, both ears  Dizziness:  yes  Noise Exposure: [place] hearing protection  Aural Fullness:  no, both ears  Otalgia: yes, both ears  Family history of hearing loss: no  Other significant history: Crohn's disease      EVALUATION:         RESULTS:    Otoscopic Evaluation:  Bilateral: clear canal, tympanic membrane visualized         NOTE: Testing completed after ears were examined by ENT provider    Tympanometry (226 Hz):  Bilateral: Type A- normal    Pure Tone Audiometry:    Bilateral: mild high frequency hearing loss    Speech Audiometry:   Right: Speech Reception Threshold (SRT) was obtained at 15 dBHL  Word Recognition scores- excellent or within normal limits (90 - 100%) using NU-6 List 1A, 25 words  Left: Speech Reception Threshold (SRT) was obtained at 10 dBHL  Word Recognition scores- excellent or within normal limits (90 - 100%) using NU-6 List 1A, 25 words    IMPRESSIONS:  Tympanometry showed normal middle ear pressure and static compliance, for both ears. Pure tone thresholds for both ears show a mild high frequency hearing loss. Loss is likely sensorineural due to normal tympanograms. Patient was counseled with regard to the findings.    Amplification needs:  Patient could benefit from hearing aids. Patient's word recognition scores were excellent    Diagnosis:  1.  Bilateral high frequency hearing loss    2. Otalgia of both ears    3. Dizziness         RECOMMENDATIONS/PLAN:  Follow-up recommendations per Braxton Elias MD    Audiologic follow-up in 1 year  Return for audiologic testing if noticing changes in hearing or concerns arise  Hearing aid evaluation and counseling upon medical clearance and patient motivation  Use communication strategies  Use hearing protection around loud noises     EDUCATION:  Discussed results and recommendations with patient. Questions were addressed and the patient was encouraged to contact our department should concerns arise.        WILLA Neil  Licensed Audiologist

## 2022-05-12 ENCOUNTER — PROCEDURE VISIT (OUTPATIENT)
Dept: OTOLARYNGOLOGY | Facility: CLINIC | Age: 37
End: 2022-05-12

## 2022-05-12 ENCOUNTER — OFFICE VISIT (OUTPATIENT)
Dept: OTOLARYNGOLOGY | Facility: CLINIC | Age: 37
End: 2022-05-12

## 2022-05-12 VITALS
TEMPERATURE: 97.3 F | HEART RATE: 95 BPM | BODY MASS INDEX: 27.09 KG/M2 | HEIGHT: 60 IN | SYSTOLIC BLOOD PRESSURE: 130 MMHG | WEIGHT: 138 LBS | DIASTOLIC BLOOD PRESSURE: 84 MMHG

## 2022-05-12 DIAGNOSIS — H92.03 OTALGIA OF BOTH EARS: ICD-10-CM

## 2022-05-12 DIAGNOSIS — R42 DIZZINESS: ICD-10-CM

## 2022-05-12 DIAGNOSIS — H69.83 ETD (EUSTACHIAN TUBE DYSFUNCTION), BILATERAL: ICD-10-CM

## 2022-05-12 DIAGNOSIS — H91.93 DECREASED HEARING OF BOTH EARS: ICD-10-CM

## 2022-05-12 DIAGNOSIS — R42 DIZZINESS: Primary | ICD-10-CM

## 2022-05-12 DIAGNOSIS — H91.93 BILATERAL HIGH FREQUENCY HEARING LOSS: Primary | ICD-10-CM

## 2022-05-12 PROCEDURE — 92557 COMPREHENSIVE HEARING TEST: CPT

## 2022-05-12 PROCEDURE — 92567 TYMPANOMETRY: CPT

## 2022-05-12 PROCEDURE — 99204 OFFICE O/P NEW MOD 45 MIN: CPT | Performed by: EMERGENCY MEDICINE

## 2022-05-12 RX ORDER — FLUTICASONE PROPIONATE 50 MCG
2 SPRAY, SUSPENSION (ML) NASAL DAILY
Qty: 16 G | Refills: 11 | Status: SHIPPED | OUTPATIENT
Start: 2022-05-12

## 2022-05-12 NOTE — PROGRESS NOTES
COLLIN Baca  [facility] EAR [facility]SE & THROAT  [address], [address]  [place] [address]  Fax [phone]  Phone [phone]      Visit Type: NEW PATIENT   Chief Complaint   Patient presents with   • Ear Problem        HPI  She complains of otalgia and decreased hearing. The symptoms are not localized to a particular location. The patient has had mild symptoms. The symptoms have been relatively constant for the last several months. There have been no identified factors that aggravate the symptoms. There have been no factors that have improved the symptoms. She has noticed that she has difficulty hearing her young children.  She also reports they have chicken haylee and when she is around the baby chicks her ears seem to hurt.    Past Medical History:   Diagnosis Date   • Crohn's disease (HCC)        Past Surgical History:   Procedure Laterality Date   • ANAL FISTULA REPAIR     •  SECTION     • COLON SURGERY  2018    removed piece of intestine due to Crohn's   • COLONOSCOPY  2016   • DILATATION AND CURETTAGE         Family History: Her family history is not on file.     Social History: She  reports that she has never smoked. She has never used smokeless tobacco. She reports that she does not drink alcohol and does not use drugs.    Home Medications:  Adalimumab, Probiotic, dicyclomine, fluticasone, levothyroxine, mesalamine, and phentermine    Allergies:  She is allergic to guaifenesin, guaifenesin er, and sulfa antibiotics.       Vital Signs:   Temp:  [97.3 °F (36.3 °C)] 97.3 °F (36.3 °C)  Heart Rate:  [95] 95  BP: (130)/(84) 130/84  ENT Physical Exam  Constitutional  Appearance: patient appears well-developed, well-nourished and well-groomed,  Communication/Voice: communication appropriate for developmental age; vocal quality normal;  Head and Face  Appearance: head appears normal, face appears normal and face appears  atraumatic;  Palpation: facial palpation normal;  Salivary: glands normal;  Ear  Hearing: intact;  Auricles: right auricle normal; left auricle normal;  External Mastoids: right external mastoid normal; left external mastoid normal;  Ear Canals: right ear canal normal; left ear canal normal;  Tympanic Membranes: right tympanic membrane normal; left tympanic membrane normal;  Nose  Internal Nose: bilateral intranasal mucosa edematous and erythematous;  Neck  Neck: neck normal; neck palpation normal;  Thyroid: thyroid normal;  Respiratory  Inspection: breathing unlabored; normal breathing rate;  Auscultation: breath sounds are clear;  Cardiovascular  Inspection: extremities are warm and well perfused;         Result Review    RESULTS REVIEW    I have reviewed the patients old records in the chart.     Assessment & Plan    Diagnoses and all orders for this visit:    1. Dizziness (Primary)  -     Comprehensive Hearing Test    2. Otalgia of both ears  -     Comprehensive Hearing Test    3. Decreased hearing of both ears  -     Comprehensive Hearing Test    4. ETD (Eustachian tube dysfunction), bilateral    Other orders  -     fluticasone (FLONASE) 50 MCG/ACT nasal spray; 2 sprays into the nostril(s) as directed by provider Daily.  Dispense: 16 g; Refill: 11            Call for ear problems, especially change of hearing, ear pain or dizziness.  For the best results, use nasal sprays every day. It may take a week to build up in the nasal lining and a few more weeks to improve the eustachian tube function.  Use hearing protection in loud noise situations.  Consider hearing aid amplification.  Obtain a yearly audiogram to follow hearing.      Return in about 3 months (around 8/12/2022) for Follow up with COLLIN Kline.      COLLIN Baca  05/12/22  10:15 CDT

## 2024-08-27 ENCOUNTER — PROCEDURE VISIT (OUTPATIENT)
Dept: OTOLARYNGOLOGY | Facility: CLINIC | Age: 39
End: 2024-08-27
Payer: COMMERCIAL

## 2024-08-27 ENCOUNTER — OFFICE VISIT (OUTPATIENT)
Dept: OTOLARYNGOLOGY | Facility: CLINIC | Age: 39
End: 2024-08-27
Payer: COMMERCIAL

## 2024-08-27 VITALS — WEIGHT: 146 LBS | BODY MASS INDEX: 28.66 KG/M2 | HEIGHT: 60 IN

## 2024-08-27 DIAGNOSIS — H93.13 TINNITUS, BILATERAL: Primary | ICD-10-CM

## 2024-08-27 DIAGNOSIS — H90.3 SENSORINEURAL HEARING LOSS, BILATERAL: ICD-10-CM

## 2024-08-27 RX ORDER — OMEPRAZOLE 40 MG/1
40 CAPSULE, DELAYED RELEASE ORAL DAILY
COMMUNITY

## 2024-08-27 RX ORDER — TOPIRAMATE 25 MG/1
1 TABLET, FILM COATED ORAL EVERY 12 HOURS SCHEDULED
COMMUNITY
Start: 2024-08-07

## 2024-08-27 NOTE — PROGRESS NOTES
AUDIOMETRIC EVALUATION      Name:  Dhara Carmona  :  1985  Age:  38 y.o.  Date of Evaluation:  2024       History:  Ms. Carmona is seen today for a hearing evaluation due to tinnitus and ear pain at the request of OCLLIN Hopson.    Audiologic Information:  Concerns for Hearing: No  PETs: No  Other otologic surgical history: No  Aural Pressure/Fullness: No  Otalgia: Mostly left ear. 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   Family history of hearing loss: No  Head trauma requiring hospital stay: No  Chemotherapy: No  Other significant history: Crohn's disease     **Case history obtained in office and through EMR system      EVALUATION:        RESULTS:    Otoscopic Evaluation:  Right: clear canal, tympanic membrane visualized  Left: clear canal, tympanic membrane visualized    Tympanometry (226 Hz):  Right: Type A  Left: Type A      Pure Tone Audiometry:    Bilateral: Normal sloping to moderate after 4kHz sensorineural hearing loss     Speech Audiometry:   Right: Speech Reception Threshold (SRT) was obtained at 15 dB HL  Word Recognition scores - Excellent (100)% at 55 dB, using NU-6 List 1A with 10 words  Left: Speech Reception Threshold (SRT) was obtained at 10 dB HL  Word Recognition scores - Excellent (100)% at 50 dB, using NU-6 List 2A with 10 words  SRT/PTA in good agreement bilaterally.    IMPRESSIONS:  Tympanometry showed normal middle ear pressure and static compliance, consistent with normal middle ear function for both ears.      Pure tone thresholds for both ears show moderate high frequency sensorineural hearing loss, suggesting normal outer/middle ear function and abnormal cochlear/retrocochlear function.       Diagnosis:  1. Tinnitus, bilateral    2. Sensorineural hearing loss, bilateral         RECOMMENDATIONS/PLAN:  Follow-up recommendations per Elly Saenz  COLLIN Lizarraga.  Practice good communication strategies to assist with everyday listening (eye contact with speakers, reduce background noise, encourage others to communicate clearly and slowly).  Tinnitus management strategies. Consider use of amplification, a white noise machine, low level TV/radio, or fan at night to help mask the tinnitus. It is also recommended to avoid caffeine, alcohol, tobacco, salt, high dose NSAIDs, and to increase hydration. Additional resources: Resound Relief sarita and American Tinnitus Association (www.loren.org).  Repeat hearing evaluation if changes in hearing are noted or concerns arise.  Discussed results and recommendations with patient. Questions were addressed and the patient was encouraged to contact our department should concerns arise.        Carolyn Graham, CCC-A  Doctor of Audiology

## 2024-08-27 NOTE — PROGRESS NOTES
COLLIN Hopson  [facility] EAR NOSE & THROAT  [address], [address]  [place] [address]  Fax [phone]  Phone [phone]      Visit Type: FOLLOW UP   Chief Complaint   Patient presents with    Tinnitus           HPI  She presents for a follow up evaluation. She has had continued problems with tinnitus.      Past Medical History:   Diagnosis Date    Crohn's disease        Past Surgical History:   Procedure Laterality Date    ANAL FISTULA REPAIR       SECTION      COLON SURGERY  2018    removed piece of intestine due to Crohn's    COLONOSCOPY  2016    DILATATION AND CURETTAGE         Family History: Her family history is not on file.     Social History: She  reports that she has never smoked. She has never used smokeless tobacco. She reports that she does not drink alcohol and does not use drugs.    Home Medications:  Adalimumab, Probiotic, dicyclomine, levothyroxine, omeprazole, and topiramate    Allergies:  She is allergic to guaifenesin, guaifenesin er, and sulfa antibiotics.       Vital Signs:      ENT Physical Exam  Ear  Hearing: intact;  Auricles: right auricle normal; left auricle normal;  External Mastoids: right external mastoid normal; left external mastoid normal;  Ear Canals: right ear canal normal; left ear canal normal;  Tympanic Membranes: right tympanic membrane normal; left tympanic membrane normal;           Result Review       RESULTS REVIEW    I have reviewed the patients old records in the chart.        Assessment & Plan  Tinnitus, bilateral              Use hearing protection in loud noise situations.  Return if symptoms worsen or fail to improve.        Electronically signed by COLLIN Hopson 24 11:27 AM CDT.

## 2025-08-12 ENCOUNTER — TRANSCRIBE ORDERS (OUTPATIENT)
Dept: ADMINISTRATIVE | Age: 40
End: 2025-08-12

## 2025-08-12 DIAGNOSIS — Z12.31 ENCOUNTER FOR SCREENING MAMMOGRAM FOR MALIGNANT NEOPLASM OF BREAST: Primary | ICD-10-CM

## 2025-08-19 ENCOUNTER — HOSPITAL ENCOUNTER (OUTPATIENT)
Dept: WOMENS IMAGING | Age: 40
Discharge: HOME | End: 2025-08-19
Payer: MEDICAID

## 2025-08-19 DIAGNOSIS — Z12.31 ENCOUNTER FOR SCREENING MAMMOGRAM FOR MALIGNANT NEOPLASM OF BREAST: ICD-10-CM

## 2025-08-19 PROCEDURE — 77063 BREAST TOMOSYNTHESIS BI: CPT

## (undated) DEVICE — SOLUTION IV IRRIG POUR BRL 0.9% SODIUM CHL 2F7124

## (undated) DEVICE — GLOVE SURG SZ 85 CRM LTX FREE POLYISOPRENE POLYMER BEAD ANTI

## (undated) DEVICE — GLOVE SURG SZ 8 CRM LTX FREE POLYISOPRENE POLYMER BEAD ANTI

## (undated) DEVICE — SURGICAL PROCEDURE PACK GYNECOLOGIC MIN LOURDES HOSP

## (undated) DEVICE — KIT SPEC COLL 1ML WHT POLYPR TB W/ LIQ AMIES M REG FLOCKED

## (undated) DEVICE — STRIP WND PK W0.25INXL5YD IODO GZ TIGHTLY WVN CURAD